# Patient Record
Sex: MALE | Race: ASIAN | NOT HISPANIC OR LATINO
[De-identification: names, ages, dates, MRNs, and addresses within clinical notes are randomized per-mention and may not be internally consistent; named-entity substitution may affect disease eponyms.]

---

## 2017-03-14 ENCOUNTER — APPOINTMENT (OUTPATIENT)
Dept: CARDIOLOGY | Facility: CLINIC | Age: 67
End: 2017-03-14

## 2017-05-01 ENCOUNTER — APPOINTMENT (OUTPATIENT)
Dept: CARDIOLOGY | Facility: CLINIC | Age: 67
End: 2017-05-01

## 2017-05-01 VITALS
SYSTOLIC BLOOD PRESSURE: 121 MMHG | DIASTOLIC BLOOD PRESSURE: 64 MMHG | WEIGHT: 225 LBS | OXYGEN SATURATION: 96 % | BODY MASS INDEX: 32.28 KG/M2 | HEART RATE: 70 BPM

## 2017-05-02 ENCOUNTER — NON-APPOINTMENT (OUTPATIENT)
Age: 67
End: 2017-05-02

## 2017-07-05 ENCOUNTER — OUTPATIENT (OUTPATIENT)
Dept: OUTPATIENT SERVICES | Facility: HOSPITAL | Age: 67
LOS: 1 days | End: 2017-07-05
Payer: MEDICARE

## 2017-07-05 ENCOUNTER — APPOINTMENT (OUTPATIENT)
Dept: RADIOLOGY | Facility: CLINIC | Age: 67
End: 2017-07-05

## 2017-07-05 DIAGNOSIS — Z00.8 ENCOUNTER FOR OTHER GENERAL EXAMINATION: ICD-10-CM

## 2017-07-05 PROCEDURE — 72100 X-RAY EXAM L-S SPINE 2/3 VWS: CPT

## 2017-10-05 ENCOUNTER — OUTPATIENT (OUTPATIENT)
Dept: OUTPATIENT SERVICES | Facility: HOSPITAL | Age: 67
LOS: 1 days | End: 2017-10-05
Payer: MEDICARE

## 2017-10-05 ENCOUNTER — APPOINTMENT (OUTPATIENT)
Dept: CT IMAGING | Facility: CLINIC | Age: 67
End: 2017-10-05
Payer: MEDICARE

## 2017-10-05 DIAGNOSIS — Z00.8 ENCOUNTER FOR OTHER GENERAL EXAMINATION: ICD-10-CM

## 2017-10-05 PROCEDURE — 70450 CT HEAD/BRAIN W/O DYE: CPT | Mod: 26

## 2017-10-05 PROCEDURE — 70450 CT HEAD/BRAIN W/O DYE: CPT

## 2017-11-01 ENCOUNTER — OUTPATIENT (OUTPATIENT)
Dept: OUTPATIENT SERVICES | Facility: HOSPITAL | Age: 67
LOS: 1 days | End: 2017-11-01
Payer: MEDICARE

## 2017-11-01 ENCOUNTER — APPOINTMENT (OUTPATIENT)
Dept: ULTRASOUND IMAGING | Facility: CLINIC | Age: 67
End: 2017-11-01
Payer: MEDICARE

## 2017-11-01 DIAGNOSIS — Z00.8 ENCOUNTER FOR OTHER GENERAL EXAMINATION: ICD-10-CM

## 2017-11-01 PROCEDURE — 76705 ECHO EXAM OF ABDOMEN: CPT

## 2017-11-01 PROCEDURE — 76705 ECHO EXAM OF ABDOMEN: CPT | Mod: 26

## 2018-01-09 ENCOUNTER — APPOINTMENT (OUTPATIENT)
Dept: CARDIOLOGY | Facility: CLINIC | Age: 68
End: 2018-01-09
Payer: MEDICARE

## 2018-01-09 VITALS
DIASTOLIC BLOOD PRESSURE: 73 MMHG | SYSTOLIC BLOOD PRESSURE: 126 MMHG | OXYGEN SATURATION: 95 % | WEIGHT: 220 LBS | HEART RATE: 68 BPM | BODY MASS INDEX: 31.5 KG/M2 | HEIGHT: 70 IN

## 2018-01-09 PROCEDURE — 99214 OFFICE O/P EST MOD 30 MIN: CPT | Mod: 25

## 2018-01-09 PROCEDURE — 93000 ELECTROCARDIOGRAM COMPLETE: CPT

## 2018-02-05 ENCOUNTER — NON-APPOINTMENT (OUTPATIENT)
Age: 68
End: 2018-02-05

## 2018-05-22 ENCOUNTER — APPOINTMENT (OUTPATIENT)
Dept: CARDIOLOGY | Facility: CLINIC | Age: 68
End: 2018-05-22
Payer: MEDICARE

## 2018-05-22 ENCOUNTER — NON-APPOINTMENT (OUTPATIENT)
Age: 68
End: 2018-05-22

## 2018-05-22 VITALS
WEIGHT: 209 LBS | HEART RATE: 75 BPM | DIASTOLIC BLOOD PRESSURE: 74 MMHG | HEIGHT: 70 IN | SYSTOLIC BLOOD PRESSURE: 114 MMHG | BODY MASS INDEX: 29.92 KG/M2 | TEMPERATURE: 98.4 F | OXYGEN SATURATION: 97 % | RESPIRATION RATE: 16 BRPM

## 2018-05-22 PROCEDURE — 99214 OFFICE O/P EST MOD 30 MIN: CPT | Mod: 25

## 2018-05-22 PROCEDURE — 93000 ELECTROCARDIOGRAM COMPLETE: CPT

## 2018-07-12 ENCOUNTER — RESULT REVIEW (OUTPATIENT)
Age: 68
End: 2018-07-12

## 2018-07-12 ENCOUNTER — OUTPATIENT (OUTPATIENT)
Dept: OUTPATIENT SERVICES | Facility: HOSPITAL | Age: 68
LOS: 1 days | Discharge: HOME | End: 2018-07-12
Payer: MEDICARE

## 2018-07-12 VITALS
HEART RATE: 62 BPM | RESPIRATION RATE: 24 BRPM | DIASTOLIC BLOOD PRESSURE: 69 MMHG | TEMPERATURE: 97 F | HEIGHT: 69 IN | SYSTOLIC BLOOD PRESSURE: 123 MMHG | WEIGHT: 212.08 LBS

## 2018-07-12 DIAGNOSIS — Z98.890 OTHER SPECIFIED POSTPROCEDURAL STATES: Chronic | ICD-10-CM

## 2018-07-12 PROCEDURE — 88305 TISSUE EXAM BY PATHOLOGIST: CPT | Mod: 26

## 2018-07-15 DIAGNOSIS — K64.8 OTHER HEMORRHOIDS: ICD-10-CM

## 2018-07-15 DIAGNOSIS — Z79.84 LONG TERM (CURRENT) USE OF ORAL HYPOGLYCEMIC DRUGS: ICD-10-CM

## 2018-07-15 DIAGNOSIS — I10 ESSENTIAL (PRIMARY) HYPERTENSION: ICD-10-CM

## 2018-07-15 DIAGNOSIS — Z12.11 ENCOUNTER FOR SCREENING FOR MALIGNANT NEOPLASM OF COLON: ICD-10-CM

## 2018-07-15 DIAGNOSIS — E11.9 TYPE 2 DIABETES MELLITUS WITHOUT COMPLICATIONS: ICD-10-CM

## 2018-07-15 DIAGNOSIS — I25.10 ATHEROSCLEROTIC HEART DISEASE OF NATIVE CORONARY ARTERY WITHOUT ANGINA PECTORIS: ICD-10-CM

## 2018-07-15 DIAGNOSIS — Z95.5 PRESENCE OF CORONARY ANGIOPLASTY IMPLANT AND GRAFT: ICD-10-CM

## 2018-07-15 DIAGNOSIS — Z86.010 PERSONAL HISTORY OF COLONIC POLYPS: ICD-10-CM

## 2018-07-15 DIAGNOSIS — Z82.49 FAMILY HISTORY OF ISCHEMIC HEART DISEASE AND OTHER DISEASES OF THE CIRCULATORY SYSTEM: ICD-10-CM

## 2018-07-15 DIAGNOSIS — Z86.19 PERSONAL HISTORY OF OTHER INFECTIOUS AND PARASITIC DISEASES: ICD-10-CM

## 2018-07-15 DIAGNOSIS — E78.5 HYPERLIPIDEMIA, UNSPECIFIED: ICD-10-CM

## 2018-07-16 LAB — SURGICAL PATHOLOGY FINAL REPORT - CH: SIGNIFICANT CHANGE UP

## 2018-08-29 ENCOUNTER — NON-APPOINTMENT (OUTPATIENT)
Age: 68
End: 2018-08-29

## 2018-08-29 ENCOUNTER — APPOINTMENT (OUTPATIENT)
Dept: CARDIOLOGY | Facility: CLINIC | Age: 68
End: 2018-08-29
Payer: MEDICARE

## 2018-08-29 VITALS
SYSTOLIC BLOOD PRESSURE: 122 MMHG | BODY MASS INDEX: 30.92 KG/M2 | DIASTOLIC BLOOD PRESSURE: 69 MMHG | HEART RATE: 78 BPM | WEIGHT: 216 LBS | OXYGEN SATURATION: 98 % | HEIGHT: 70 IN

## 2018-08-29 PROBLEM — E78.5 HYPERLIPIDEMIA, UNSPECIFIED: Chronic | Status: ACTIVE | Noted: 2018-07-12

## 2018-08-29 PROBLEM — B19.10 UNSPECIFIED VIRAL HEPATITIS B WITHOUT HEPATIC COMA: Chronic | Status: ACTIVE | Noted: 2018-07-12

## 2018-08-29 PROBLEM — I70.90 UNSPECIFIED ATHEROSCLEROSIS: Chronic | Status: ACTIVE | Noted: 2018-07-12

## 2018-08-29 PROCEDURE — 93000 ELECTROCARDIOGRAM COMPLETE: CPT

## 2018-08-29 PROCEDURE — 99214 OFFICE O/P EST MOD 30 MIN: CPT | Mod: 25

## 2018-09-10 ENCOUNTER — APPOINTMENT (OUTPATIENT)
Dept: CARDIOLOGY | Facility: CLINIC | Age: 68
End: 2018-09-10
Payer: MEDICARE

## 2018-09-10 PROCEDURE — 93306 TTE W/DOPPLER COMPLETE: CPT

## 2018-09-13 ENCOUNTER — APPOINTMENT (OUTPATIENT)
Dept: CARDIOLOGY | Facility: CLINIC | Age: 68
End: 2018-09-13
Payer: MEDICARE

## 2018-09-13 PROCEDURE — 93880 EXTRACRANIAL BILAT STUDY: CPT

## 2018-10-19 ENCOUNTER — NON-APPOINTMENT (OUTPATIENT)
Age: 68
End: 2018-10-19

## 2018-10-19 ENCOUNTER — APPOINTMENT (OUTPATIENT)
Dept: CARDIOLOGY | Facility: CLINIC | Age: 68
End: 2018-10-19
Payer: MEDICARE

## 2018-10-19 VITALS
WEIGHT: 216 LBS | TEMPERATURE: 99.3 F | BODY MASS INDEX: 30.92 KG/M2 | OXYGEN SATURATION: 98 % | RESPIRATION RATE: 16 BRPM | HEART RATE: 67 BPM | SYSTOLIC BLOOD PRESSURE: 124 MMHG | HEIGHT: 70 IN | DIASTOLIC BLOOD PRESSURE: 75 MMHG

## 2018-10-19 DIAGNOSIS — Z87.898 PERSONAL HISTORY OF OTHER SPECIFIED CONDITIONS: ICD-10-CM

## 2018-10-19 PROCEDURE — 90686 IIV4 VACC NO PRSV 0.5 ML IM: CPT

## 2018-10-19 PROCEDURE — 93000 ELECTROCARDIOGRAM COMPLETE: CPT

## 2018-10-19 PROCEDURE — G0008: CPT

## 2018-10-19 PROCEDURE — 99214 OFFICE O/P EST MOD 30 MIN: CPT | Mod: 25

## 2018-11-14 ENCOUNTER — APPOINTMENT (OUTPATIENT)
Dept: CARDIOLOGY | Facility: CLINIC | Age: 68
End: 2018-11-14

## 2018-11-20 ENCOUNTER — APPOINTMENT (OUTPATIENT)
Dept: CARDIOLOGY | Facility: CLINIC | Age: 68
End: 2018-11-20
Payer: MEDICARE

## 2018-11-20 ENCOUNTER — NON-APPOINTMENT (OUTPATIENT)
Age: 68
End: 2018-11-20

## 2018-11-20 VITALS
WEIGHT: 220 LBS | BODY MASS INDEX: 31.5 KG/M2 | HEART RATE: 80 BPM | SYSTOLIC BLOOD PRESSURE: 148 MMHG | OXYGEN SATURATION: 96 % | DIASTOLIC BLOOD PRESSURE: 78 MMHG | HEIGHT: 70 IN

## 2018-11-20 DIAGNOSIS — Z92.29 PERSONAL HISTORY OF OTHER DRUG THERAPY: ICD-10-CM

## 2018-11-20 PROCEDURE — 99215 OFFICE O/P EST HI 40 MIN: CPT | Mod: 25

## 2018-11-20 PROCEDURE — 93000 ELECTROCARDIOGRAM COMPLETE: CPT

## 2018-11-20 RX ORDER — METFORMIN HYDROCHLORIDE 500 MG/1
500 TABLET, COATED ORAL TWICE DAILY
Refills: 0 | Status: DISCONTINUED | COMMUNITY
End: 2018-11-20

## 2018-11-20 NOTE — REASON FOR VISIT
[Follow-Up - Clinic] : a clinic follow-up of [Coronary Artery Disease] : coronary artery disease [Medication Management] : Medication management

## 2018-11-20 NOTE — REVIEW OF SYSTEMS
[Fever] : no fever [Chills] : no chills [Feeling Fatigued] : not feeling fatigued [Dyspnea on exertion] : dyspnea during exertion [Chest  Pressure] : no chest pressure [Chest Pain] : no chest pain [Palpitations] : no palpitations [Cough] : no cough

## 2018-11-20 NOTE — HISTORY OF PRESENT ILLNESS
[FreeTextEntry1] : Having DUBON with stairs but no chest pain.  Otherwise denies chest pain, shortness of breath, palpitations, orthopnea, paroxysmal nocturnal dyspnea, claudication, dizziness,  lightheadedness, presyncopal, or syncopal symptoms.\par

## 2018-11-20 NOTE — DISCUSSION/SUMMARY
[___ Month(s)] : [unfilled] month(s) [With ___] : with [unfilled] [FreeTextEntry1] : DUBON with HX. of DM and CAD: Stress test for further evaluation. \par Hyperlipidemia: Tolerating simvastatin; patient says that lipids are routinely monitored by Co and recent blood work is good.\par Diabetes mellitus: Recent change to long acting metformin. \par F/u after test

## 2018-11-20 NOTE — PHYSICAL EXAM
[Well Groomed] : well groomed [General Appearance - In No Acute Distress] : no acute distress [Normal Conjunctiva] : the conjunctiva exhibited no abnormalities [Normal Oral Mucosa] : normal oral mucosa [No Oral Pallor] : no oral pallor [Normal Jugular Venous A Waves Present] : normal jugular venous A waves present [Normal Jugular Venous V Waves Present] : normal jugular venous V waves present [Respiration, Rhythm And Depth] : normal respiratory rhythm and effort [Auscultation Breath Sounds / Voice Sounds] : lungs were clear to auscultation bilaterally [5th Left ICS - MCL] : palpated at the 5th LICS in the midclavicular line [Normal] : normal [Rhythm Regular] : regular [Normal S1] : normal S1 [Normal S2] : normal S2 [I] : a grade 1 [2+] : left 2+ [No Pitting Edema] : no pitting edema present [Bowel Sounds] : normal bowel sounds [Abdomen Soft] : soft [Abnormal Walk] : normal gait [Nail Clubbing] : no clubbing of the fingernails [Cyanosis, Localized] : no localized cyanosis [] : no rash [Oriented To Time, Place, And Person] : oriented to person, place, and time [Affect] : the affect was normal [No Anxiety] : not feeling anxious

## 2018-11-29 ENCOUNTER — APPOINTMENT (OUTPATIENT)
Dept: CARDIOLOGY | Facility: CLINIC | Age: 68
End: 2018-11-29

## 2018-12-05 ENCOUNTER — APPOINTMENT (OUTPATIENT)
Dept: CARDIOLOGY | Facility: CLINIC | Age: 68
End: 2018-12-05

## 2018-12-13 ENCOUNTER — OTHER (OUTPATIENT)
Age: 68
End: 2018-12-13

## 2019-04-18 ENCOUNTER — APPOINTMENT (OUTPATIENT)
Dept: CARDIOLOGY | Facility: CLINIC | Age: 69
End: 2019-04-18

## 2019-04-30 ENCOUNTER — APPOINTMENT (OUTPATIENT)
Dept: CARDIOLOGY | Facility: CLINIC | Age: 69
End: 2019-04-30

## 2019-08-13 ENCOUNTER — APPOINTMENT (OUTPATIENT)
Dept: CARDIOLOGY | Facility: CLINIC | Age: 69
End: 2019-08-13
Payer: MEDICARE

## 2019-08-13 PROCEDURE — 78452 HT MUSCLE IMAGE SPECT MULT: CPT

## 2019-08-13 PROCEDURE — A9500: CPT

## 2019-08-13 PROCEDURE — 93015 CV STRESS TEST SUPVJ I&R: CPT

## 2019-08-19 ENCOUNTER — APPOINTMENT (OUTPATIENT)
Dept: ORTHOPEDIC SURGERY | Facility: CLINIC | Age: 69
End: 2019-08-19
Payer: MEDICARE

## 2019-08-19 VITALS
BODY MASS INDEX: 31.5 KG/M2 | SYSTOLIC BLOOD PRESSURE: 121 MMHG | HEIGHT: 70 IN | DIASTOLIC BLOOD PRESSURE: 67 MMHG | WEIGHT: 220 LBS | HEART RATE: 69 BPM

## 2019-08-19 PROCEDURE — 73562 X-RAY EXAM OF KNEE 3: CPT | Mod: LT

## 2019-08-19 PROCEDURE — 99204 OFFICE O/P NEW MOD 45 MIN: CPT

## 2019-08-19 NOTE — CONSULT LETTER
[Consult Letter:] : I had the pleasure of evaluating your patient, [unfilled]. [Please see my note below.] : Please see my note below. [Sincerely,] : Sincerely, [FreeTextEntry3] : Wilberto Stewart, DO\par Foot and Ankle Surgery \par

## 2019-08-19 NOTE — DISCUSSION/SUMMARY
[de-identified] : Today I had a lengthy discussion with the patient regarding their left knee pain. I have addressed all the patient's concerns surrounding the pathology of their condition. I recommend the patient undergo a course of physical therapy for the left knee 2-3 times a week for a total of 6-8 weeks. A prescription was given for the physical therapy today. I also recommended that the patient begin use of NSAIDs to help with the pain and inflammation. The patient understood and verbally agreed to the treatment plan. All of their questions were answered and they were satisfied with the visit. The patient should call the office if they have any questions or experience worsening symptoms.\par \par \par

## 2019-08-19 NOTE — ADDENDUM
[FreeTextEntry1] : I, Felisha Figueroa, acted solely as a scribe for Dr. Wilberto Stewart on this date 08/19/2019 .\par \par All medical record entries made by the Scribe were at my, Dr. Wilberto Stewart, direction and personally dictated by me on 08/19/2019 . I have reviewed the chart and agree that the record accurately reflects my personal performance of the history, physical exam, assessment and plan. I have also personally directed, reviewed, and agreed with the chart.\par \par \par

## 2019-08-19 NOTE — HISTORY OF PRESENT ILLNESS
[FreeTextEntry1] : 69 year old male presenting with left knee pain. He states that the pain is localized to the anterior left knee. The patient complains that the pain increases when walking and bending. He states that the pain is achy and throbbing. He is currently taking no pain medication. No other complaints at this time.\par \par \par

## 2019-08-19 NOTE — PHYSICAL EXAM
[de-identified] : General: Alert and oriented x3. In no acute distress. Pleasant in nature with a normal affect. No apparent respiratory distress.\par Left Knee Exam\par Skin: Clean, dry, intact\par Inspection: No obvious malalignment, no masses, no swelling, no effusion\par Pulses: 2+ DP/PT pulses\par ROM: Left Knee 0-120 degrees of flexion. No pain with deep knee flexion.\par Tenderness: No MJLT. LJLT tenderness. No pain over the patella facets. No pain to the quadriceps mechanism.\par Stability: Stable to varus, valgus, lachman testing. Negative anterior/posterior drawer.\par Strength: 5/5 Q/H/TA/GS/EHL, no atrophy\par Neuro: In tact to light touch throughout, DTR's normal\par Additional tests: Negative McMurrays test, Negative patellar grind test.  [de-identified] : 3V of the left knee were ordered obtained and reviewed by me today, 08/19/2019 , revealed: Patellofemoral arthritis. Clear space narrowing. \par \par

## 2019-09-04 ENCOUNTER — APPOINTMENT (OUTPATIENT)
Dept: CARDIOLOGY | Facility: CLINIC | Age: 69
End: 2019-09-04
Payer: MEDICARE

## 2019-09-04 ENCOUNTER — NON-APPOINTMENT (OUTPATIENT)
Age: 69
End: 2019-09-04

## 2019-09-04 VITALS
OXYGEN SATURATION: 98 % | HEART RATE: 59 BPM | DIASTOLIC BLOOD PRESSURE: 62 MMHG | HEIGHT: 70 IN | SYSTOLIC BLOOD PRESSURE: 108 MMHG | WEIGHT: 218 LBS | BODY MASS INDEX: 31.21 KG/M2

## 2019-09-04 DIAGNOSIS — M23.92 UNSPECIFIED INTERNAL DERANGEMENT OF LEFT KNEE: ICD-10-CM

## 2019-09-04 PROCEDURE — 93000 ELECTROCARDIOGRAM COMPLETE: CPT

## 2019-09-04 PROCEDURE — 99214 OFFICE O/P EST MOD 30 MIN: CPT | Mod: 25

## 2019-09-04 NOTE — DISCUSSION/SUMMARY
[___ Month(s)] : [unfilled] month(s) [With ___] : with [unfilled] [FreeTextEntry1] : DUBON with HX. of DM and CAD: Stress test negative for ischemia. reports sleep apnea testing 3-4 yrs ago and negative.  Picture c/w deconditioning from him not exercising likely due to his knee.  Discussed ways to work on this and types of exercise to help improve.  \par Hyperlipidemia: Tolerating simvastatin; patient says that lipids are routinely monitored by Co and recent blood work is good.\par Diabetes mellitus: Reports good results when checked recently. \par F/u 1 year

## 2019-09-04 NOTE — REASON FOR VISIT
[Follow-Up - Clinic] : a clinic follow-up of [Coronary Artery Disease] : coronary artery disease [Medication Management] : Medication management [Dyspnea] : dyspnea

## 2019-09-04 NOTE — REVIEW OF SYSTEMS
[Dyspnea on exertion] : dyspnea during exertion [Fever] : no fever [Chills] : no chills [Feeling Fatigued] : not feeling fatigued [Chest  Pressure] : no chest pressure [Chest Pain] : no chest pain [Palpitations] : no palpitations [Cough] : no cough

## 2019-09-04 NOTE — CARDIOLOGY SUMMARY
[LVEF ___%] : LVEF [unfilled]% [None] : normal LV function [Normal] : normal [___] : [unfilled] [No Ischemia] : no Ischemia

## 2019-09-04 NOTE — HISTORY OF PRESENT ILLNESS
[FreeTextEntry1] : Still having occasional DUBON with stairs but no chest pain. Stress test negative for ischemia.  Is very inactive because of his knee and is scheduled to start physical therapy (has seen ortho already).  Otherwise denies shortness of breath at rest, palpitations, orthopnea, paroxysmal nocturnal dyspnea, claudication, dizziness,  lightheadedness, presyncopal, or syncopal symptoms.\par

## 2019-09-04 NOTE — PHYSICAL EXAM
[Well Groomed] : well groomed [General Appearance - In No Acute Distress] : no acute distress [Normal Conjunctiva] : the conjunctiva exhibited no abnormalities [Normal Oral Mucosa] : normal oral mucosa [No Oral Pallor] : no oral pallor [Normal Jugular Venous A Waves Present] : normal jugular venous A waves present [Normal Jugular Venous V Waves Present] : normal jugular venous V waves present [Respiration, Rhythm And Depth] : normal respiratory rhythm and effort [Auscultation Breath Sounds / Voice Sounds] : lungs were clear to auscultation bilaterally [Bowel Sounds] : normal bowel sounds [Abdomen Soft] : soft [Abnormal Walk] : normal gait [Nail Clubbing] : no clubbing of the fingernails [Cyanosis, Localized] : no localized cyanosis [] : no rash [Affect] : the affect was normal [Oriented To Time, Place, And Person] : oriented to person, place, and time [No Anxiety] : not feeling anxious [5th Left ICS - MCL] : palpated at the 5th LICS in the midclavicular line [Normal] : normal [Rhythm Regular] : regular [Normal S1] : normal S1 [Normal S2] : normal S2 [I] : a grade 1 [2+] : left 2+ [No Pitting Edema] : no pitting edema present

## 2019-09-19 ENCOUNTER — RX RENEWAL (OUTPATIENT)
Age: 69
End: 2019-09-19

## 2019-10-01 ENCOUNTER — TRANSCRIPTION ENCOUNTER (OUTPATIENT)
Age: 69
End: 2019-10-01

## 2019-10-01 NOTE — ASU PATIENT PROFILE, ADULT - PMH
Arteriosclerosis    CAD (coronary artery disease)    Diabetes 1.5, managed as type 2    DJD (degenerative joint disease)    GERD (gastroesophageal reflux disease)    Heartburn    Hepatitis B    HTN (hypertension)    Hyperlipidemia    Osteoporosis

## 2019-10-02 ENCOUNTER — OUTPATIENT (OUTPATIENT)
Dept: OUTPATIENT SERVICES | Facility: HOSPITAL | Age: 69
LOS: 1 days | End: 2019-10-02
Payer: MEDICARE

## 2019-10-02 VITALS
TEMPERATURE: 98 F | RESPIRATION RATE: 19 BRPM | SYSTOLIC BLOOD PRESSURE: 140 MMHG | HEIGHT: 69 IN | HEART RATE: 60 BPM | WEIGHT: 213.85 LBS | DIASTOLIC BLOOD PRESSURE: 70 MMHG | OXYGEN SATURATION: 97 %

## 2019-10-02 VITALS
RESPIRATION RATE: 17 BRPM | HEART RATE: 64 BPM | DIASTOLIC BLOOD PRESSURE: 73 MMHG | SYSTOLIC BLOOD PRESSURE: 135 MMHG | OXYGEN SATURATION: 98 %

## 2019-10-02 DIAGNOSIS — H25.10 AGE-RELATED NUCLEAR CATARACT, UNSPECIFIED EYE: ICD-10-CM

## 2019-10-02 DIAGNOSIS — Z98.890 OTHER SPECIFIED POSTPROCEDURAL STATES: Chronic | ICD-10-CM

## 2019-10-02 LAB — GLUCOSE BLDC GLUCOMTR-MCNC: 231 MG/DL — HIGH (ref 70–99)

## 2019-10-02 PROCEDURE — 82962 GLUCOSE BLOOD TEST: CPT

## 2019-10-02 PROCEDURE — 66984 XCAPSL CTRC RMVL W/O ECP: CPT | Mod: LT

## 2019-10-02 PROCEDURE — V2788: CPT

## 2019-10-02 NOTE — ASU DISCHARGE PLAN (ADULT/PEDIATRIC) - CALL YOUR DOCTOR IF YOU HAVE ANY OF THE FOLLOWING:
Bleeding that does not stop/Nausea and vomiting that does not stop/Fever greater than (need to indicate Fahrenheit or Celsius)/Pain not relieved by Medications

## 2019-10-16 ENCOUNTER — RX RENEWAL (OUTPATIENT)
Age: 69
End: 2019-10-16

## 2019-10-25 PROBLEM — I10 ESSENTIAL (PRIMARY) HYPERTENSION: Chronic | Status: ACTIVE | Noted: 2019-10-01

## 2019-10-25 PROBLEM — M81.0 AGE-RELATED OSTEOPOROSIS WITHOUT CURRENT PATHOLOGICAL FRACTURE: Chronic | Status: ACTIVE | Noted: 2019-10-01

## 2019-10-25 PROBLEM — K21.9 GASTRO-ESOPHAGEAL REFLUX DISEASE WITHOUT ESOPHAGITIS: Chronic | Status: ACTIVE | Noted: 2019-10-01

## 2019-10-25 PROBLEM — M19.90 UNSPECIFIED OSTEOARTHRITIS, UNSPECIFIED SITE: Chronic | Status: ACTIVE | Noted: 2019-10-01

## 2019-10-25 PROBLEM — I25.10 ATHEROSCLEROTIC HEART DISEASE OF NATIVE CORONARY ARTERY WITHOUT ANGINA PECTORIS: Chronic | Status: ACTIVE | Noted: 2019-10-01

## 2019-10-29 ENCOUNTER — TRANSCRIPTION ENCOUNTER (OUTPATIENT)
Age: 69
End: 2019-10-29

## 2019-10-30 ENCOUNTER — OUTPATIENT (OUTPATIENT)
Dept: OUTPATIENT SERVICES | Facility: HOSPITAL | Age: 69
LOS: 1 days | End: 2019-10-30
Payer: MEDICARE

## 2019-10-30 VITALS
OXYGEN SATURATION: 96 % | DIASTOLIC BLOOD PRESSURE: 62 MMHG | HEART RATE: 67 BPM | SYSTOLIC BLOOD PRESSURE: 125 MMHG | RESPIRATION RATE: 18 BRPM | WEIGHT: 213.41 LBS | HEIGHT: 71 IN | TEMPERATURE: 98 F

## 2019-10-30 VITALS
OXYGEN SATURATION: 98 % | HEART RATE: 78 BPM | DIASTOLIC BLOOD PRESSURE: 61 MMHG | SYSTOLIC BLOOD PRESSURE: 133 MMHG | RESPIRATION RATE: 18 BRPM

## 2019-10-30 DIAGNOSIS — H25.11 AGE-RELATED NUCLEAR CATARACT, RIGHT EYE: ICD-10-CM

## 2019-10-30 DIAGNOSIS — Z98.49 CATARACT EXTRACTION STATUS, UNSPECIFIED EYE: Chronic | ICD-10-CM

## 2019-10-30 DIAGNOSIS — Z98.890 OTHER SPECIFIED POSTPROCEDURAL STATES: Chronic | ICD-10-CM

## 2019-10-30 LAB — GLUCOSE BLDC GLUCOMTR-MCNC: 266 MG/DL — HIGH (ref 70–99)

## 2019-10-30 PROCEDURE — 66984 XCAPSL CTRC RMVL W/O ECP: CPT | Mod: RT

## 2019-10-30 PROCEDURE — V2788: CPT

## 2019-10-30 PROCEDURE — 82962 GLUCOSE BLOOD TEST: CPT

## 2019-10-30 NOTE — ASU PATIENT PROFILE, ADULT - PSH
History of cardiac catheterization  with 4 stents 2003,2006,2012  Status post cataract extraction  left eye done on 10/2/2019

## 2019-10-30 NOTE — ASU DISCHARGE PLAN (ADULT/PEDIATRIC) - CARE PROVIDER_API CALL
Neville King)  Ophthalmology  [address] [facility], [address] [place] [address]  Phone: [phone]  Fax: [phone]  Follow Up Time:

## 2019-11-22 ENCOUNTER — APPOINTMENT (OUTPATIENT)
Dept: ULTRASOUND IMAGING | Facility: CLINIC | Age: 69
End: 2019-11-22
Payer: MEDICARE

## 2019-11-22 ENCOUNTER — OUTPATIENT (OUTPATIENT)
Dept: OUTPATIENT SERVICES | Facility: HOSPITAL | Age: 69
LOS: 1 days | End: 2019-11-22
Payer: MEDICARE

## 2019-11-22 DIAGNOSIS — Z98.890 OTHER SPECIFIED POSTPROCEDURAL STATES: Chronic | ICD-10-CM

## 2019-11-22 DIAGNOSIS — Z00.8 ENCOUNTER FOR OTHER GENERAL EXAMINATION: ICD-10-CM

## 2019-11-22 DIAGNOSIS — Z98.49 CATARACT EXTRACTION STATUS, UNSPECIFIED EYE: Chronic | ICD-10-CM

## 2019-11-22 PROCEDURE — 76700 US EXAM ABDOM COMPLETE: CPT | Mod: 26

## 2019-11-22 PROCEDURE — 76700 US EXAM ABDOM COMPLETE: CPT

## 2019-11-26 ENCOUNTER — INPATIENT (INPATIENT)
Facility: HOSPITAL | Age: 69
LOS: 2 days | Discharge: ANOTHER HOSPITAL | DRG: 281 | End: 2019-11-29
Attending: FAMILY MEDICINE | Admitting: HOSPITALIST
Payer: MEDICARE

## 2019-11-26 VITALS — WEIGHT: 214.95 LBS | HEIGHT: 68 IN

## 2019-11-26 DIAGNOSIS — Z98.49 CATARACT EXTRACTION STATUS, UNSPECIFIED EYE: Chronic | ICD-10-CM

## 2019-11-26 DIAGNOSIS — Z98.890 OTHER SPECIFIED POSTPROCEDURAL STATES: Chronic | ICD-10-CM

## 2019-11-26 LAB
ALBUMIN SERPL ELPH-MCNC: 3.3 G/DL — SIGNIFICANT CHANGE UP (ref 3.3–5)
ALP SERPL-CCNC: 166 U/L — HIGH (ref 40–120)
ALT FLD-CCNC: 34 U/L — SIGNIFICANT CHANGE UP (ref 12–78)
ANION GAP SERPL CALC-SCNC: 5 MMOL/L — SIGNIFICANT CHANGE UP (ref 5–17)
APTT BLD: 29.5 SEC — SIGNIFICANT CHANGE UP (ref 27.5–36.3)
AST SERPL-CCNC: 24 U/L — SIGNIFICANT CHANGE UP (ref 15–37)
BILIRUB SERPL-MCNC: 0.3 MG/DL — SIGNIFICANT CHANGE UP (ref 0.2–1.2)
BUN SERPL-MCNC: 24 MG/DL — HIGH (ref 7–23)
CALCIUM SERPL-MCNC: 8.8 MG/DL — SIGNIFICANT CHANGE UP (ref 8.5–10.1)
CHLORIDE SERPL-SCNC: 103 MMOL/L — SIGNIFICANT CHANGE UP (ref 96–108)
CO2 SERPL-SCNC: 26 MMOL/L — SIGNIFICANT CHANGE UP (ref 22–31)
CREAT SERPL-MCNC: 1.33 MG/DL — HIGH (ref 0.5–1.3)
GLUCOSE SERPL-MCNC: 354 MG/DL — HIGH (ref 70–99)
HCT VFR BLD CALC: 41.8 % — SIGNIFICANT CHANGE UP (ref 39–50)
HGB BLD-MCNC: 12.9 G/DL — LOW (ref 13–17)
INR BLD: 0.98 RATIO — SIGNIFICANT CHANGE UP (ref 0.88–1.16)
MAGNESIUM SERPL-MCNC: 2 MG/DL — SIGNIFICANT CHANGE UP (ref 1.6–2.6)
MCHC RBC-ENTMCNC: 25.9 PG — LOW (ref 27–34)
MCHC RBC-ENTMCNC: 30.9 GM/DL — LOW (ref 32–36)
MCV RBC AUTO: 83.9 FL — SIGNIFICANT CHANGE UP (ref 80–100)
NT-PROBNP SERPL-SCNC: 51 PG/ML — SIGNIFICANT CHANGE UP (ref 0–125)
PLATELET # BLD AUTO: 143 K/UL — LOW (ref 150–400)
POTASSIUM SERPL-MCNC: 4.8 MMOL/L — SIGNIFICANT CHANGE UP (ref 3.5–5.3)
POTASSIUM SERPL-SCNC: 4.8 MMOL/L — SIGNIFICANT CHANGE UP (ref 3.5–5.3)
PROT SERPL-MCNC: 7.6 GM/DL — SIGNIFICANT CHANGE UP (ref 6–8.3)
PROTHROM AB SERPL-ACNC: 10.9 SEC — SIGNIFICANT CHANGE UP (ref 10–12.9)
RBC # BLD: 4.98 M/UL — SIGNIFICANT CHANGE UP (ref 4.2–5.8)
RBC # FLD: 13.6 % — SIGNIFICANT CHANGE UP (ref 10.3–14.5)
SODIUM SERPL-SCNC: 134 MMOL/L — LOW (ref 135–145)
TROPONIN I SERPL-MCNC: 0.15 NG/ML — HIGH (ref 0.01–0.04)
WBC # BLD: 8.21 K/UL — SIGNIFICANT CHANGE UP (ref 3.8–10.5)
WBC # FLD AUTO: 8.21 K/UL — SIGNIFICANT CHANGE UP (ref 3.8–10.5)

## 2019-11-26 PROCEDURE — 71045 X-RAY EXAM CHEST 1 VIEW: CPT | Mod: 26

## 2019-11-26 PROCEDURE — 93010 ELECTROCARDIOGRAM REPORT: CPT

## 2019-11-26 RX ORDER — ASPIRIN/CALCIUM CARB/MAGNESIUM 324 MG
324 TABLET ORAL ONCE
Refills: 0 | Status: COMPLETED | OUTPATIENT
Start: 2019-11-26 | End: 2019-11-26

## 2019-11-26 RX ADMIN — Medication 324 MILLIGRAM(S): at 23:21

## 2019-11-26 NOTE — ED ADULT TRIAGE NOTE - CHIEF COMPLAINT QUOTE
c/o intermittent chest pain first starting last night. Chest pain returned at 1700 & again at 2200. Pt took Nitro x2 tabs at 2220, No distress noted. Denies N/V

## 2019-11-27 DIAGNOSIS — I25.10 ATHEROSCLEROTIC HEART DISEASE OF NATIVE CORONARY ARTERY WITHOUT ANGINA PECTORIS: ICD-10-CM

## 2019-11-27 DIAGNOSIS — E13.9 OTHER SPECIFIED DIABETES MELLITUS WITHOUT COMPLICATIONS: ICD-10-CM

## 2019-11-27 DIAGNOSIS — I24.9 ACUTE ISCHEMIC HEART DISEASE, UNSPECIFIED: ICD-10-CM

## 2019-11-27 DIAGNOSIS — E78.5 HYPERLIPIDEMIA, UNSPECIFIED: ICD-10-CM

## 2019-11-27 LAB
ANION GAP SERPL CALC-SCNC: 6 MMOL/L — SIGNIFICANT CHANGE UP (ref 5–17)
APPEARANCE UR: CLEAR — SIGNIFICANT CHANGE UP
BILIRUB UR-MCNC: NEGATIVE — SIGNIFICANT CHANGE UP
BUN SERPL-MCNC: 20 MG/DL — SIGNIFICANT CHANGE UP (ref 7–23)
CALCIUM SERPL-MCNC: 8.7 MG/DL — SIGNIFICANT CHANGE UP (ref 8.5–10.1)
CHLORIDE SERPL-SCNC: 106 MMOL/L — SIGNIFICANT CHANGE UP (ref 96–108)
CK SERPL-CCNC: 63 U/L — SIGNIFICANT CHANGE UP (ref 26–308)
CK SERPL-CCNC: 68 U/L — SIGNIFICANT CHANGE UP (ref 26–308)
CO2 SERPL-SCNC: 24 MMOL/L — SIGNIFICANT CHANGE UP (ref 22–31)
COLOR SPEC: YELLOW — SIGNIFICANT CHANGE UP
CREAT SERPL-MCNC: 1.17 MG/DL — SIGNIFICANT CHANGE UP (ref 0.5–1.3)
DIFF PNL FLD: NEGATIVE — SIGNIFICANT CHANGE UP
GLUCOSE SERPL-MCNC: 180 MG/DL — HIGH (ref 70–99)
GLUCOSE UR QL: 1000 MG/DL
HCT VFR BLD CALC: 39.2 % — SIGNIFICANT CHANGE UP (ref 39–50)
HGB BLD-MCNC: 12.6 G/DL — LOW (ref 13–17)
KETONES UR-MCNC: NEGATIVE — SIGNIFICANT CHANGE UP
LEUKOCYTE ESTERASE UR-ACNC: NEGATIVE — SIGNIFICANT CHANGE UP
MCHC RBC-ENTMCNC: 26.5 PG — LOW (ref 27–34)
MCHC RBC-ENTMCNC: 32.1 GM/DL — SIGNIFICANT CHANGE UP (ref 32–36)
MCV RBC AUTO: 82.4 FL — SIGNIFICANT CHANGE UP (ref 80–100)
NITRITE UR-MCNC: NEGATIVE — SIGNIFICANT CHANGE UP
PH UR: 5 — SIGNIFICANT CHANGE UP (ref 5–8)
PLATELET # BLD AUTO: 126 K/UL — LOW (ref 150–400)
POTASSIUM SERPL-MCNC: 4.5 MMOL/L — SIGNIFICANT CHANGE UP (ref 3.5–5.3)
POTASSIUM SERPL-SCNC: 4.5 MMOL/L — SIGNIFICANT CHANGE UP (ref 3.5–5.3)
PROT UR-MCNC: NEGATIVE MG/DL — SIGNIFICANT CHANGE UP
RBC # BLD: 4.76 M/UL — SIGNIFICANT CHANGE UP (ref 4.2–5.8)
RBC # FLD: 13.8 % — SIGNIFICANT CHANGE UP (ref 10.3–14.5)
SODIUM SERPL-SCNC: 136 MMOL/L — SIGNIFICANT CHANGE UP (ref 135–145)
SP GR SPEC: 1.01 — SIGNIFICANT CHANGE UP (ref 1.01–1.02)
TROPONIN I SERPL-MCNC: 0.3 NG/ML — HIGH (ref 0.01–0.04)
TROPONIN I SERPL-MCNC: 0.38 NG/ML — HIGH (ref 0.01–0.04)
TROPONIN I SERPL-MCNC: 0.45 NG/ML — HIGH (ref 0.01–0.04)
UROBILINOGEN FLD QL: NEGATIVE MG/DL — SIGNIFICANT CHANGE UP
WBC # BLD: 7.42 K/UL — SIGNIFICANT CHANGE UP (ref 3.8–10.5)
WBC # FLD AUTO: 7.42 K/UL — SIGNIFICANT CHANGE UP (ref 3.8–10.5)

## 2019-11-27 PROCEDURE — 86803 HEPATITIS C AB TEST: CPT

## 2019-11-27 PROCEDURE — 93306 TTE W/DOPPLER COMPLETE: CPT | Mod: 26

## 2019-11-27 PROCEDURE — C1769: CPT

## 2019-11-27 PROCEDURE — 36415 COLL VENOUS BLD VENIPUNCTURE: CPT

## 2019-11-27 PROCEDURE — 81003 URINALYSIS AUTO W/O SCOPE: CPT

## 2019-11-27 PROCEDURE — C1894: CPT

## 2019-11-27 PROCEDURE — C1760: CPT

## 2019-11-27 PROCEDURE — 85025 COMPLETE CBC W/AUTO DIFF WBC: CPT

## 2019-11-27 PROCEDURE — C1887: CPT

## 2019-11-27 PROCEDURE — 82550 ASSAY OF CK (CPK): CPT

## 2019-11-27 PROCEDURE — 93306 TTE W/DOPPLER COMPLETE: CPT

## 2019-11-27 PROCEDURE — 99223 1ST HOSP IP/OBS HIGH 75: CPT

## 2019-11-27 PROCEDURE — 93458 L HRT ARTERY/VENTRICLE ANGIO: CPT

## 2019-11-27 PROCEDURE — G0269: CPT

## 2019-11-27 PROCEDURE — 83735 ASSAY OF MAGNESIUM: CPT

## 2019-11-27 PROCEDURE — 93010 ELECTROCARDIOGRAM REPORT: CPT

## 2019-11-27 PROCEDURE — 84100 ASSAY OF PHOSPHORUS: CPT

## 2019-11-27 PROCEDURE — 93005 ELECTROCARDIOGRAM TRACING: CPT

## 2019-11-27 PROCEDURE — 80048 BASIC METABOLIC PNL TOTAL CA: CPT

## 2019-11-27 PROCEDURE — 83036 HEMOGLOBIN GLYCOSYLATED A1C: CPT

## 2019-11-27 PROCEDURE — 85027 COMPLETE CBC AUTOMATED: CPT

## 2019-11-27 PROCEDURE — 84484 ASSAY OF TROPONIN QUANT: CPT

## 2019-11-27 PROCEDURE — 82962 GLUCOSE BLOOD TEST: CPT

## 2019-11-27 RX ORDER — NITROGLYCERIN 6.5 MG
0.4 CAPSULE, EXTENDED RELEASE ORAL
Refills: 0 | Status: DISCONTINUED | OUTPATIENT
Start: 2019-11-27 | End: 2019-11-29

## 2019-11-27 RX ORDER — ASPIRIN/CALCIUM CARB/MAGNESIUM 324 MG
162 TABLET ORAL DAILY
Refills: 0 | Status: DISCONTINUED | OUTPATIENT
Start: 2019-11-27 | End: 2019-11-29

## 2019-11-27 RX ORDER — SODIUM CHLORIDE 9 MG/ML
1000 INJECTION, SOLUTION INTRAVENOUS
Refills: 0 | Status: DISCONTINUED | OUTPATIENT
Start: 2019-11-27 | End: 2019-11-27

## 2019-11-27 RX ORDER — ASPIRIN/CALCIUM CARB/MAGNESIUM 324 MG
81 TABLET ORAL DAILY
Refills: 0 | Status: DISCONTINUED | OUTPATIENT
Start: 2019-11-27 | End: 2019-11-27

## 2019-11-27 RX ORDER — VITAMIN E 100 UNIT
400 CAPSULE ORAL DAILY
Refills: 0 | Status: DISCONTINUED | OUTPATIENT
Start: 2019-11-27 | End: 2019-11-29

## 2019-11-27 RX ORDER — DEXTROSE 50 % IN WATER 50 %
25 SYRINGE (ML) INTRAVENOUS ONCE
Refills: 0 | Status: DISCONTINUED | OUTPATIENT
Start: 2019-11-27 | End: 2019-11-29

## 2019-11-27 RX ORDER — SODIUM CHLORIDE 9 MG/ML
1000 INJECTION, SOLUTION INTRAVENOUS
Refills: 0 | Status: DISCONTINUED | OUTPATIENT
Start: 2019-11-27 | End: 2019-11-29

## 2019-11-27 RX ORDER — METFORMIN HYDROCHLORIDE 850 MG/1
1 TABLET ORAL
Qty: 0 | Refills: 0 | DISCHARGE

## 2019-11-27 RX ORDER — SIMVASTATIN 20 MG/1
40 TABLET, FILM COATED ORAL AT BEDTIME
Refills: 0 | Status: DISCONTINUED | OUTPATIENT
Start: 2019-11-27 | End: 2019-11-29

## 2019-11-27 RX ORDER — LECITHIN 1200 MG
0 CAPSULE ORAL
Qty: 0 | Refills: 0 | DISCHARGE

## 2019-11-27 RX ORDER — DEXTROSE 50 % IN WATER 50 %
15 SYRINGE (ML) INTRAVENOUS ONCE
Refills: 0 | Status: DISCONTINUED | OUTPATIENT
Start: 2019-11-27 | End: 2019-11-29

## 2019-11-27 RX ORDER — PANTOPRAZOLE SODIUM 20 MG/1
40 TABLET, DELAYED RELEASE ORAL
Refills: 0 | Status: DISCONTINUED | OUTPATIENT
Start: 2019-11-27 | End: 2019-11-29

## 2019-11-27 RX ORDER — INSULIN LISPRO 100/ML
VIAL (ML) SUBCUTANEOUS EVERY 6 HOURS
Refills: 0 | Status: DISCONTINUED | OUTPATIENT
Start: 2019-11-27 | End: 2019-11-29

## 2019-11-27 RX ORDER — DEXTROSE 50 % IN WATER 50 %
12.5 SYRINGE (ML) INTRAVENOUS ONCE
Refills: 0 | Status: DISCONTINUED | OUTPATIENT
Start: 2019-11-27 | End: 2019-11-29

## 2019-11-27 RX ORDER — METOPROLOL TARTRATE 50 MG
50 TABLET ORAL
Refills: 0 | Status: DISCONTINUED | OUTPATIENT
Start: 2019-11-27 | End: 2019-11-29

## 2019-11-27 RX ORDER — ASPIRIN/CALCIUM CARB/MAGNESIUM 324 MG
1 TABLET ORAL
Qty: 0 | Refills: 0 | DISCHARGE

## 2019-11-27 RX ORDER — GLUCAGON INJECTION, SOLUTION 0.5 MG/.1ML
1 INJECTION, SOLUTION SUBCUTANEOUS ONCE
Refills: 0 | Status: DISCONTINUED | OUTPATIENT
Start: 2019-11-27 | End: 2019-11-29

## 2019-11-27 RX ORDER — CANAGLIFLOZIN 100 MG/1
1 TABLET, FILM COATED ORAL
Qty: 0 | Refills: 0 | DISCHARGE

## 2019-11-27 RX ORDER — ACETAMINOPHEN 500 MG
650 TABLET ORAL EVERY 6 HOURS
Refills: 0 | Status: DISCONTINUED | OUTPATIENT
Start: 2019-11-27 | End: 2019-11-29

## 2019-11-27 RX ADMIN — SIMVASTATIN 40 MILLIGRAM(S): 20 TABLET, FILM COATED ORAL at 22:40

## 2019-11-27 RX ADMIN — Medication 0.4 MILLIGRAM(S): at 23:54

## 2019-11-27 RX ADMIN — Medication 6: at 17:26

## 2019-11-27 RX ADMIN — SODIUM CHLORIDE 75 MILLILITER(S): 9 INJECTION, SOLUTION INTRAVENOUS at 11:50

## 2019-11-27 RX ADMIN — Medication 2: at 12:29

## 2019-11-27 RX ADMIN — Medication 50 MILLIGRAM(S): at 17:27

## 2019-11-27 RX ADMIN — Medication 162 MILLIGRAM(S): at 12:29

## 2019-11-27 RX ADMIN — PANTOPRAZOLE SODIUM 40 MILLIGRAM(S): 20 TABLET, DELAYED RELEASE ORAL at 12:29

## 2019-11-27 RX ADMIN — Medication 50 MILLIGRAM(S): at 10:37

## 2019-11-27 NOTE — ED ADULT NURSE NOTE - CHPI ED NUR SYMPTOMS NEG
no syncope/no nausea/no shortness of breath/no vomiting/no congestion/no diaphoresis/no dizziness/no back pain/no fever/no chills

## 2019-11-27 NOTE — H&P ADULT - ASSESSMENT
#Chest pain  #ACS r/o MI  #Elevated troponins  #HTN, HLD, GERD  #DM  #CAD s/p stents    Plan:  -Admit to tele  -Serial enzymes and EKG  -Cardio consult  -Check TTE  -Consider NST vs. cardiac cath as per Cardio recs- NPO for now  -Continue home medications  -Hold oral hypoglycemics, monitor FS Q6 when NPO and AC and HS when on diet, mod ISS  -DVT ppx- SCDs and ambulation

## 2019-11-27 NOTE — CONSULT NOTE ADULT - SUBJECTIVE AND OBJECTIVE BOX
HPI:  68 y/o M PMHx CAD s/p stents x4 (last cath 2013), GERD, HLD, DM presented to ED with complaint of chest pain. Patient states chest pain has been intermittent for past 3 days. States pain usually lasts for about 15 minutes and subsides on its own. Patient denies any aggravating factors. States last night pain became severe and was improved after taking nitroglycerin so he came to ED for further evaluation. Patient states pain is located in the middle of his chest and sometimes will radiate to R arm.  Patient denies any SOB, but does admit to SOB on exertion for the past few weeks. Denies any abd pain, N/V/D/C.    In ED, patient received ASA 325mg x1, CXR done negative for acute cardiopulmonary process. Patient labs significant for elevation in troponin- 0.155-->0.305-->0.384. EKG showing NSR @ 79bpm (27 Nov 2019 09:25)    11/27/'19: consulted for chest pain & borderline trop elevation.  Pt reports episodes of pressure type chest pain over past 2-3 days.  Worse w/ exertion, relieved w/ rest & NTG.  No atypical features.  Also w/ chest pain today in hospital, brief in duration.      PAST MEDICAL & SURGICAL HISTORY:  Osteoporosis  DJD (degenerative joint disease)  CAD (coronary artery disease)  HTN (hypertension)  GERD (gastroesophageal reflux disease)  Hyperlipidemia  Hepatitis B  Heartburn  Diabetes 1.5, managed as type 2  Arteriosclerosis  Status post cataract extraction: left eye done on 10/2/2019  History of cardiac catheterization: with 4 stents 2003,2006,2012      Allergies    No Known Allergies    Intolerances        SOCIAL HISTORY:    FAMILY HISTORY:  FH: myocardial infarction      MEDICATIONS:  MEDICATIONS  (STANDING):  aspirin enteric coated 162 milliGRAM(s) Oral daily  dextrose 5%. 1000 milliLiter(s) (50 mL/Hr) IV Continuous <Continuous>  dextrose 50% Injectable 12.5 Gram(s) IV Push once  dextrose 50% Injectable 25 Gram(s) IV Push once  dextrose 50% Injectable 25 Gram(s) IV Push once  insulin lispro (HumaLOG) corrective regimen sliding scale   SubCutaneous every 6 hours  metoprolol tartrate 50 milliGRAM(s) Oral two times a day  pantoprazole    Tablet 40 milliGRAM(s) Oral before breakfast  simvastatin 40 milliGRAM(s) Oral at bedtime  vitamin E 400 International Unit(s) Oral daily    MEDICATIONS  (PRN):  acetaminophen   Tablet .. 650 milliGRAM(s) Oral every 6 hours PRN Mild Pain (1 - 3)  dextrose 40% Gel 15 Gram(s) Oral once PRN Blood Glucose LESS THAN 70 milliGRAM(s)/deciliter  glucagon  Injectable 1 milliGRAM(s) IntraMuscular once PRN Glucose LESS THAN 70 milligrams/deciliter  nitroglycerin     SubLingual 0.4 milliGRAM(s) SubLingual every 5 minutes PRN Chest Pain      REVIEW OF SYSTEMS:    CONSTITUTIONAL: No weakness, fevers or chills  EYES/ENT: No visual changes;  No vertigo or throat pain   NECK: No pain or stiffness  RESPIRATORY: No cough, wheezing, hemoptysis; No shortness of breath  GASTROINTESTINAL: No abdominal or epigastric pain. No nausea, vomiting, or hematemesis; No diarrhea or constipation. No melena or hematochezia.  GENITOURINARY: No dysuria, frequency or hematuria  NEUROLOGICAL: No numbness or weakness  SKIN: No itching, burning, rashes, or lesions   All other review of systems is negative unless indicated above    Vital Signs Last 24 Hrs  T(C): 36.7 (27 Nov 2019 12:38), Max: 36.7 (26 Nov 2019 22:52)  T(F): 98.1 (27 Nov 2019 12:38), Max: 98.1 (27 Nov 2019 12:38)  HR: 59 (27 Nov 2019 12:38) (59 - 80)  BP: 146/61 (27 Nov 2019 12:38) (116/64 - 146/61)  BP(mean): 76 (27 Nov 2019 02:07) (76 - 76)  RR: 18 (27 Nov 2019 12:38) (15 - 18)  SpO2: 96% (27 Nov 2019 12:38) (96% - 99%)    I&O's Summary      PHYSICAL EXAM:    Constitutional: NAD, awake and alert, well-developed  HEENT: PERR, EOMI,  No oral cyananosis.  Neck:  supple,  No JVD  Respiratory: Breath sounds are clear bilaterally, No wheezing, rales or rhonchi  Cardiovascular: S1 and S2, regular rate and rhythm, no Murmurs, gallops or rubs  Gastrointestinal: Bowel Sounds present, soft, nontender.   Extremities: No peripheral edema. No clubbing or cyanosis.  Vascular: 2+ peripheral pulses  Neurological: A/O x 3, no focal deficits  Musculoskeletal: no calf tenderness.  Skin: No rashes.      LABS: All Labs Reviewed:                        12.6   7.42  )-----------( 126      ( 27 Nov 2019 08:25 )             39.2                         12.9   8.21  )-----------( 143      ( 26 Nov 2019 23:06 )             41.8     27 Nov 2019 08:25    136    |  106    |  20     ----------------------------<  180    4.5     |  24     |  1.17   26 Nov 2019 23:06    134    |  103    |  24     ----------------------------<  354    4.8     |  26     |  1.33     Ca    8.7        27 Nov 2019 08:25  Ca    8.8        26 Nov 2019 23:06  Mg     2.0       26 Nov 2019 23:06    TPro  7.6    /  Alb  3.3    /  TBili  0.3    /  DBili  x      /  AST  24     /  ALT  34     /  AlkPhos  166    26 Nov 2019 23:06    PT/INR - ( 26 Nov 2019 23:06 )   PT: 10.9 sec;   INR: 0.98 ratio         PTT - ( 26 Nov 2019 23:06 )  PTT:29.5 sec  CARDIAC MARKERS ( 27 Nov 2019 08:25 )  0.384 ng/mL / x     / 63 U/L / x     / x      CARDIAC MARKERS ( 27 Nov 2019 02:24 )  0.305 ng/mL / x     / x     / x     / x      CARDIAC MARKERS ( 26 Nov 2019 23:06 )  0.155 ng/mL / x     / x     / x     / x          Blood Culture:   11-26 @ 23:06  Pro Bnp 51    RADIOLOGY/EKG:  ECG- NSR no ischemic changes.    < from: Transthoracic Echocardiogram (11.27.19 @ 11:03) >   Summary     Fibrocalcific changes noted to the mitral valve leaflets with preserved   leaflet excursion.   Trace mitral regurgitation is present.   Fibrocalcific changes noted to the Aortic valve leaflets with preserved   leaflet excursion.   Mild (1+) aortic regurgitation is present.   The left ventricle is normal in size, wall thickness, wall motion and   contractility.   Estimated left ventricular ejection fraction is 65-70 %.     Signature     ----------------------------------------------------------------   Electronically signed by MARY JeanInterpreting   physician) on 11/27/2019 03:26 PM   ----------------------------------------------------------------    < end of copied text >

## 2019-11-27 NOTE — H&P ADULT - RS GEN PE MLT RESP DETAILS PC
no intercostal retractions/no wheezes/no chest wall tenderness/no rhonchi/breath sounds equal/clear to auscultation bilaterally/no rales/respirations non-labored

## 2019-11-27 NOTE — ED ADULT NURSE NOTE - OBJECTIVE STATEMENT
Pt c/o chest pain starting 2 days ago. Pt hx 4 stents, DM. Pt took 2 baby aspirin this AM and 1 nitro at 10:22pm and another nitro at 10:50pm. Pt states nitro relieved pain medication. Pt is alert and oriented x4. Pt denies shortness of breath. Pt states chest pain is no longer present in ED. EKG done, cardiac monitoring in progress, safety maintained.

## 2019-11-27 NOTE — ED PROVIDER NOTE - OBJECTIVE STATEMENT
68 yo male with ho cardiac stent 2013 by Dr. Brady guillory intermittent chest pain since yesterday. Tonight pt had pain at 2200 took 2 nitroglycerin with resolution of symptoms. currently is chest pain free. PCP Dr. Causey, Cardiology: Audie

## 2019-11-27 NOTE — H&P ADULT - NSICDXPASTMEDICALHX_GEN_ALL_CORE_FT
PAST MEDICAL HISTORY:  Arteriosclerosis     CAD (coronary artery disease)     Diabetes 1.5, managed as type 2     DJD (degenerative joint disease)     GERD (gastroesophageal reflux disease)     Heartburn     Hepatitis B     HTN (hypertension)     Hyperlipidemia     Osteoporosis

## 2019-11-27 NOTE — H&P ADULT - NSICDXPASTSURGICALHX_GEN_ALL_CORE_FT
PAST SURGICAL HISTORY:  History of cardiac catheterization with 4 stents 2003,2006,2012    Status post cataract extraction left eye done on 10/2/2019

## 2019-11-27 NOTE — CONSULT NOTE ADULT - PROBLEM SELECTOR RECOMMENDATION 9
atypical chest pain w/ features concerning for angina.  Trop levels borderline.  Echo w/ no regional wall motion abnormalities.  cont. serial cardiac enzymes.  If significant elevation in trop (>1-3) will start hep & keep in hospital until mon for cath.  If trends down, will discharge w/ plan for cath next monday as OP.

## 2019-11-27 NOTE — H&P ADULT - HISTORY OF PRESENT ILLNESS
68 y/o M PMHx CAD s/p stents x4 (last cath 2013), GERD, HLD, DM presented to ED with complaint of chest pain. Patient states chest pain has been intermittent for past 3 days. States pain usually lasts for about 15 minutes and subsides on its own. Patient denies any aggravating factors. States last night pain became severe and was improved after taking nitroglycerin so he came to ED for further evaluation. Patient states pain is located in the middle of his chest and sometimes will radiate to R arm.  Patient denies any SOB, but does admit to SOB on exertion for the past few weeks. Denies any abd pain, N/V/D/C.    In ED, patient received ASA 325mg x1, CXR done negative for acute cardiopulmonary process. Patient labs significant for elevation in troponin- 0.155-->0.305-->0.384. EKG showing NSR @ 79bpm

## 2019-11-27 NOTE — H&P ADULT - NEUROLOGICAL DETAILS
no spontaneous movement/responds to verbal commands/sensation intact/cranial nerves intact/normal strength/alert and oriented x 3

## 2019-11-28 DIAGNOSIS — I24.9 ACUTE ISCHEMIC HEART DISEASE, UNSPECIFIED: ICD-10-CM

## 2019-11-28 LAB
ANION GAP SERPL CALC-SCNC: 8 MMOL/L — SIGNIFICANT CHANGE UP (ref 5–17)
BASOPHILS # BLD AUTO: 0.03 K/UL — SIGNIFICANT CHANGE UP (ref 0–0.2)
BASOPHILS NFR BLD AUTO: 0.5 % — SIGNIFICANT CHANGE UP (ref 0–2)
BUN SERPL-MCNC: 18 MG/DL — SIGNIFICANT CHANGE UP (ref 7–23)
CALCIUM SERPL-MCNC: 8.7 MG/DL — SIGNIFICANT CHANGE UP (ref 8.5–10.1)
CHLORIDE SERPL-SCNC: 107 MMOL/L — SIGNIFICANT CHANGE UP (ref 96–108)
CK SERPL-CCNC: 62 U/L — SIGNIFICANT CHANGE UP (ref 26–308)
CK SERPL-CCNC: 62 U/L — SIGNIFICANT CHANGE UP (ref 26–308)
CO2 SERPL-SCNC: 25 MMOL/L — SIGNIFICANT CHANGE UP (ref 22–31)
CREAT SERPL-MCNC: 1.17 MG/DL — SIGNIFICANT CHANGE UP (ref 0.5–1.3)
EOSINOPHIL # BLD AUTO: 0.19 K/UL — SIGNIFICANT CHANGE UP (ref 0–0.5)
EOSINOPHIL NFR BLD AUTO: 3 % — SIGNIFICANT CHANGE UP (ref 0–6)
GLUCOSE SERPL-MCNC: 208 MG/DL — HIGH (ref 70–99)
HBA1C BLD-MCNC: 10.3 % — HIGH (ref 4–5.6)
HCT VFR BLD CALC: 41.2 % — SIGNIFICANT CHANGE UP (ref 39–50)
HCV AB S/CO SERPL IA: 0.1 S/CO — SIGNIFICANT CHANGE UP (ref 0–0.99)
HCV AB SERPL-IMP: SIGNIFICANT CHANGE UP
HGB BLD-MCNC: 12.9 G/DL — LOW (ref 13–17)
IMM GRANULOCYTES NFR BLD AUTO: 0.5 % — SIGNIFICANT CHANGE UP (ref 0–1.5)
LYMPHOCYTES # BLD AUTO: 2.26 K/UL — SIGNIFICANT CHANGE UP (ref 1–3.3)
LYMPHOCYTES # BLD AUTO: 35.1 % — SIGNIFICANT CHANGE UP (ref 13–44)
MAGNESIUM SERPL-MCNC: 2.2 MG/DL — SIGNIFICANT CHANGE UP (ref 1.6–2.6)
MCHC RBC-ENTMCNC: 26.1 PG — LOW (ref 27–34)
MCHC RBC-ENTMCNC: 31.3 GM/DL — LOW (ref 32–36)
MCV RBC AUTO: 83.2 FL — SIGNIFICANT CHANGE UP (ref 80–100)
MONOCYTES # BLD AUTO: 0.61 K/UL — SIGNIFICANT CHANGE UP (ref 0–0.9)
MONOCYTES NFR BLD AUTO: 9.5 % — SIGNIFICANT CHANGE UP (ref 2–14)
NEUTROPHILS # BLD AUTO: 3.32 K/UL — SIGNIFICANT CHANGE UP (ref 1.8–7.4)
NEUTROPHILS NFR BLD AUTO: 51.4 % — SIGNIFICANT CHANGE UP (ref 43–77)
PHOSPHATE SERPL-MCNC: 3.2 MG/DL — SIGNIFICANT CHANGE UP (ref 2.5–4.5)
PLATELET # BLD AUTO: 134 K/UL — LOW (ref 150–400)
POTASSIUM SERPL-MCNC: 4.5 MMOL/L — SIGNIFICANT CHANGE UP (ref 3.5–5.3)
POTASSIUM SERPL-SCNC: 4.5 MMOL/L — SIGNIFICANT CHANGE UP (ref 3.5–5.3)
RBC # BLD: 4.95 M/UL — SIGNIFICANT CHANGE UP (ref 4.2–5.8)
RBC # FLD: 14 % — SIGNIFICANT CHANGE UP (ref 10.3–14.5)
SODIUM SERPL-SCNC: 140 MMOL/L — SIGNIFICANT CHANGE UP (ref 135–145)
TROPONIN I SERPL-MCNC: 0.35 NG/ML — HIGH (ref 0.01–0.04)
WBC # BLD: 6.44 K/UL — SIGNIFICANT CHANGE UP (ref 3.8–10.5)
WBC # FLD AUTO: 6.44 K/UL — SIGNIFICANT CHANGE UP (ref 3.8–10.5)

## 2019-11-28 PROCEDURE — 99233 SBSQ HOSP IP/OBS HIGH 50: CPT

## 2019-11-28 RX ORDER — CLOPIDOGREL BISULFATE 75 MG/1
75 TABLET, FILM COATED ORAL DAILY
Refills: 0 | Status: DISCONTINUED | OUTPATIENT
Start: 2019-11-28 | End: 2019-11-29

## 2019-11-28 RX ADMIN — CLOPIDOGREL BISULFATE 75 MILLIGRAM(S): 75 TABLET, FILM COATED ORAL at 11:05

## 2019-11-28 RX ADMIN — Medication 50 MILLIGRAM(S): at 05:22

## 2019-11-28 RX ADMIN — Medication 4: at 21:20

## 2019-11-28 RX ADMIN — Medication 2: at 09:26

## 2019-11-28 RX ADMIN — Medication 400 INTERNATIONAL UNIT(S): at 18:07

## 2019-11-28 RX ADMIN — SIMVASTATIN 40 MILLIGRAM(S): 20 TABLET, FILM COATED ORAL at 21:20

## 2019-11-28 RX ADMIN — Medication 162 MILLIGRAM(S): at 11:04

## 2019-11-28 RX ADMIN — PANTOPRAZOLE SODIUM 40 MILLIGRAM(S): 20 TABLET, DELAYED RELEASE ORAL at 05:22

## 2019-11-28 RX ADMIN — Medication 50 MILLIGRAM(S): at 18:10

## 2019-11-28 NOTE — PROGRESS NOTE ADULT - SUBJECTIVE AND OBJECTIVE BOX
INTERVAL HPI/OVERNIGHT EVENTS:  70 y/o M PMHx CAD s/p stents x4 (last cath ), GERD, HLD, DM presented to ED with complaint of chest pain. Patient states chest pain has been intermittent for past 3 days. States pain usually lasts for about 15 minutes and subsides on its own. Patient denies any aggravating factors. States last night pain became severe and was improved after taking nitroglycerin so he came to ED for further evaluation. Patient states pain is located in the middle of his chest and sometimes will radiate to R arm.  Patient denies any SOB, but does admit to SOB on exertion for the past few weeks. Denies any abd pain, N/V/D/C.    In ED, patient received ASA 325mg x1, CXR done negative for acute cardiopulmonary process. Patient labs significant for elevation in troponin- 0.155-->0.305-->0.384. EKG showing NSR @ 79bpm     19- Patient seen and examined at bedside. Overnight, patient with episode of chest pain with radiation to R arm, improved after nitroglycerin. Patient states pain lasted for about 30 minutes. Currently denies any CP, SOB, abd pain. Case discussed with Cardiology, patient to go for cardiac cath today.    MEDICATIONS  (STANDING):  aspirin enteric coated 162 milliGRAM(s) Oral daily  clopidogrel Tablet 75 milliGRAM(s) Oral daily  dextrose 5%. 1000 milliLiter(s) (50 mL/Hr) IV Continuous <Continuous>  dextrose 50% Injectable 12.5 Gram(s) IV Push once  dextrose 50% Injectable 25 Gram(s) IV Push once  dextrose 50% Injectable 25 Gram(s) IV Push once  insulin lispro (HumaLOG) corrective regimen sliding scale   SubCutaneous every 6 hours  metoprolol tartrate 50 milliGRAM(s) Oral two times a day  pantoprazole    Tablet 40 milliGRAM(s) Oral before breakfast  simvastatin 40 milliGRAM(s) Oral at bedtime  vitamin E 400 International Unit(s) Oral daily    MEDICATIONS  (PRN):  acetaminophen   Tablet .. 650 milliGRAM(s) Oral every 6 hours PRN Mild Pain (1 - 3)  dextrose 40% Gel 15 Gram(s) Oral once PRN Blood Glucose LESS THAN 70 milliGRAM(s)/deciliter  glucagon  Injectable 1 milliGRAM(s) IntraMuscular once PRN Glucose LESS THAN 70 milligrams/deciliter  nitroglycerin     SubLingual 0.4 milliGRAM(s) SubLingual every 5 minutes PRN Chest Pain      Allergies    No Known Allergies    Intolerances      ROS:  CONSTITUTIONAL: No weakness, fevers or chills  EYES/ENT: No visual changes;  No vertigo or throat pain   NECK: No pain or stiffness  RESPIRATORY: No cough, wheezing, hemoptysis; No shortness of breath  CARDIOVASCULAR: +chest pain   GASTROINTESTINAL: No abdominal or epigastric pain. No nausea, vomiting, or hematemesis.  GENITOURINARY: No dysuria, frequency or hematuria  NEUROLOGICAL: No numbness or weakness  SKIN: No itching, burning, rashes, or lesions   All other review of systems is negative unless indicated above.    Vital Signs Last 24 Hrs  T(C): 36.5 (2019 10:24), Max: 36.7 (2019 12:38)  T(F): 97.7 (2019 10:24), Max: 98.1 (2019 12:38)  HR: 61 (2019 10:24) (59 - 79)  BP: 128/64 (2019 10:24) (102/52 - 173/73)  BP(mean): --  RR: 18 (2019 10:24) (18 - 20)  SpO2: 97% (2019 10:24) (94% - 97%)      Physical Exam:  General: WN/WD NAD  Neurology: A&Ox3, nonfocal, MACK x 4  Respiratory: CTA B/L  CV: RRR, S1S2, no murmurs, rubs or gallops  Abdominal: Soft, NT, ND +BS  Extremities: No edema, + peripheral pulses      LABS:                        12.9   6.44  )-----------( 134      ( 2019 08:15 )             41.2         140  |  107  |  18  ----------------------------<  208<H>  4.5   |  25  |  1.17    Ca    8.7      2019 08:15  Phos  3.2       Mg     2.2         TPro  7.6  /  Alb  3.3  /  TBili  0.3  /  DBili  x   /  AST  24  /  ALT  34  /  AlkPhos  166<H>  -    PT/INR - ( 2019 23:06 )   PT: 10.9 sec;   INR: 0.98 ratio         PTT - ( 2019 23:06 )  PTT:29.5 sec  Urinalysis Basic - ( 2019 01:27 )    Color: Yellow / Appearance: Clear / S.015 / pH: x  Gluc: x / Ketone: Negative  / Bili: Negative / Urobili: Negative mg/dL   Blood: x / Protein: Negative mg/dL / Nitrite: Negative   Leuk Esterase: Negative / RBC: x / WBC x   Sq Epi: x / Non Sq Epi: x / Bacteria: x        RADIOLOGY & ADDITIONAL TESTS:

## 2019-11-28 NOTE — PROGRESS NOTE ADULT - ASSESSMENT
#Chest pain  #ACS r/o MI  #Elevated troponins  #HTN, HLD, GERD  #DM  #CAD s/p stents    Plan:  -Serial enzymes and EKG  -Cardio consult- recommend cardiac cath today  -Check TTE- no wall abnormalities noted  -Continue home medications  -Hold oral hypoglycemics, monitor FS Q6 when NPO and AC and HS when on diet, mod ISS  -DVT ppx- SCDs and ambulation  -Patient for cardiac cath today, discussed with Cardio, plavix started

## 2019-11-28 NOTE — PROGRESS NOTE ADULT - SUBJECTIVE AND OBJECTIVE BOX
HPI:  70 y/o M PMHx CAD s/p stents x4 (last cath ), GERD, HLD, DM presented to ED with complaint of chest pain. Patient states chest pain has been intermittent for past 3 days. States pain usually lasts for about 15 minutes and subsides on its own. Patient denies any aggravating factors. States last night pain became severe and was improved after taking nitroglycerin so he came to ED for further evaluation. Patient states pain is located in the middle of his chest and sometimes will radiate to R arm.  Patient denies any SOB, but does admit to SOB on exertion for the past few weeks. Denies any abd pain, N/V/D/C.    In ED, patient received ASA 325mg x1, CXR done negative for acute cardiopulmonary process. Patient labs significant for elevation in troponin- 0.155-->0.305-->0.384. EKG showing NSR @ 79bpm (2019 09:25)    : consulted for chest pain & borderline trop elevation.  Pt reports episodes of pressure type chest pain over past 2-3 days.  Worse w/ exertion, relieved w/ rest & NTG.  No atypical features.  Also w/ chest pain today in hospital, brief in duration.  19  Events noted , had recurrent chest pain last night , elevated troponin , relieved with NTG         PAST MEDICAL & SURGICAL HISTORY:  Osteoporosis  DJD (degenerative joint disease)  CAD (coronary artery disease)  HTN (hypertension)  GERD (gastroesophageal reflux disease)  Hyperlipidemia  Hepatitis B  Heartburn  Diabetes 1.5, managed as type 2  Arteriosclerosis  Status post cataract extraction: left eye done on 10/2/2019  History of cardiac catheterization: with 4 stents ,,    MEDICATIONS  (STANDING):  aspirin enteric coated 162 milliGRAM(s) Oral daily  clopidogrel Tablet 75 milliGRAM(s) Oral daily  dextrose 5%. 1000 milliLiter(s) (50 mL/Hr) IV Continuous <Continuous>  dextrose 50% Injectable 12.5 Gram(s) IV Push once  dextrose 50% Injectable 25 Gram(s) IV Push once  dextrose 50% Injectable 25 Gram(s) IV Push once  insulin lispro (HumaLOG) corrective regimen sliding scale   SubCutaneous every 6 hours  metoprolol tartrate 50 milliGRAM(s) Oral two times a day  pantoprazole    Tablet 40 milliGRAM(s) Oral before breakfast  simvastatin 40 milliGRAM(s) Oral at bedtime  vitamin E 400 International Unit(s) Oral daily    MEDICATIONS  (PRN):  acetaminophen   Tablet .. 650 milliGRAM(s) Oral every 6 hours PRN Mild Pain (1 - 3)  dextrose 40% Gel 15 Gram(s) Oral once PRN Blood Glucose LESS THAN 70 milliGRAM(s)/deciliter  glucagon  Injectable 1 milliGRAM(s) IntraMuscular once PRN Glucose LESS THAN 70 milligrams/deciliter  nitroglycerin     SubLingual 0.4 milliGRAM(s) SubLingual every 5 minutes PRN Chest Pain    REVIEW OF SYSTEMS:    CONSTITUTIONAL: No weakness, fevers or chills  EYES/ENT: No visual changes;  No vertigo or throat pain   NECK: No pain or stiffness  RESPIRATORY: No cough, wheezing, hemoptysis; No shortness of breath  GASTROINTESTINAL: No abdominal or epigastric pain. No nausea, vomiting, or hematemesis; No diarrhea or constipation. No melena or hematochezia.  GENITOURINARY: No dysuria, frequency or hematuria  NEUROLOGICAL: No numbness or weakness  SKIN: No itching, burning, rashes, or lesions   All other review of systems is negative unless indicated above    Vital Signs Last 24 Hrs  T(C): 36.6 (2019 05:15), Max: 36.7 (2019 12:38)  T(F): 97.9 (2019 05:15), Max: 98.1 (2019 12:38)  HR: 67 (2019 05:15) (59 - 79)  BP: 121/70 (2019 05:15) (102/52 - 173/73)  BP(mean): --  RR: 18 (2019 05:15) (18 - 20)  SpO2: 97% (2019 05:15) (94% - 97%)    I&O's Summary      PHYSICAL EXAM:    Constitutional: NAD, awake and alert, well-developed  HEENT: PERR, EOMI,  No oral cyananosis.  Neck:  supple,  No JVD  Respiratory: Breath sounds are clear bilaterally, No wheezing, rales or rhonchi  Cardiovascular: S1 and S2, regular rate and rhythm, no Murmurs, gallops or rubs  Gastrointestinal: Bowel Sounds present, soft, nontender.   Extremities: No peripheral edema. No clubbing or cyanosis.  Vascular: 2+ peripheral pulses  Neurological: A/O x 3, no focal deficits  Musculoskeletal: no calf tenderness.  Skin: No rashes.      LABS: All Labs Reviewed:                         12.9   6.44  )-----------( 134      ( 2019 08:15 )             41.2         140  |  107  |  18  ----------------------------<  208<H>  4.5   |  25  |  1.17    Ca    8.7      2019 08:15  Phos  3.2       Mg     2.2         TPro  7.6  /  Alb  3.3  /  TBili  0.3  /  DBili  x   /  AST  24  /  ALT  34  /  AlkPhos  166<H>      CARDIAC MARKERS ( 2019 08:15 )  0.391 ng/mL / x     / 62 U/L / x     / x      CARDIAC MARKERS ( 2019 00:33 )  0.346 ng/mL / x     / 62 U/L / x     / x      CARDIAC MARKERS ( 2019 16:27 )  0.448 ng/mL / x     / 68 U/L / x     / x      CARDIAC MARKERS ( 2019 08:25 )  0.384 ng/mL / x     / 63 U/L / x     / x      CARDIAC MARKERS ( 2019 02:24 )  0.305 ng/mL / x     / x     / x     / x      CARDIAC MARKERS ( 2019 23:06 )  0.155 ng/mL / x     / x     / x     / x          LIVER FUNCTIONS - ( 2019 23:06 )  Alb: 3.3 g/dL / Pro: 7.6 gm/dL / ALK PHOS: 166 U/L / ALT: 34 U/L / AST: 24 U/L / GGT: x           PT/INR - ( 2019 23:06 )   PT: 10.9 sec;   INR: 0.98 ratio         PTT - ( 2019 23:06 )  PTT:29.5 sec  Urinalysis Basic - ( 2019 01:27 )    Color: Yellow / Appearance: Clear / S.015 / pH: x  Gluc: x / Ketone: Negative  / Bili: Negative / Urobili: Negative mg/dL   Blood: x / Protein: Negative mg/dL / Nitrite: Negative   Leuk Esterase: Negative / RBC: x / WBC x   Sq Epi: x / Non Sq Epi: x / Bacteria: x          RADIOLOGY/EKG:  ECG- NSR no ischemic changes.    < from: Transthoracic Echocardiogram (19 @ 11:03) >   Summary     Fibrocalcific changes noted to the mitral valve leaflets with preserved   leaflet excursion.   Trace mitral regurgitation is present.   Fibrocalcific changes noted to the Aortic valve leaflets with preserved   leaflet excursion.   Mild (1+) aortic regurgitation is present.   The left ventricle is normal in size, wall thickness, wall motion and   contractility.   Estimated left ventricular ejection fraction is 65-70 %.     Signature     ----------------------------------------------------------------   Electronically signed by Esteban Cedeno MD(Interpreting   physician) on 2019 03:26 PM   ----------------------------------------------------------------    < end of copied text >      Monitor sinus rhythm      Sinus rhythm non specific ST changes

## 2019-11-28 NOTE — CHART NOTE - NSCHARTNOTEFT_GEN_A_CORE
Called by RN to inform of patient c/o chest pain with radiation to R arm. Patient seen in bed resting comfortably in no apparent distress, has no complaints at this time, stated that he started with left sided chest pain, of ~7/10, with radiation to R arm.     Vital Signs Last 24 Hrs  T(C): 36.7 (27 Nov 2019 23:42), Max: 36.7 (27 Nov 2019 12:38)  T(F): 98 (27 Nov 2019 23:42), Max: 98.1 (27 Nov 2019 12:38)  HR: 79 (27 Nov 2019 23:42) (59 - 79)  BP: 173/73 (27 Nov 2019 23:42) (102/52 - 173/73)  BP(mean): 76 (27 Nov 2019 02:07) (76 - 76)  RR: 20 (27 Nov 2019 23:42) (18 - 20)  SpO2: 96% (27 Nov 2019 23:42) (94% - 96%)    PHYSICAL EXAM:  CHEST/LUNG: Clear to auscultation bilaterally; No rales, rhonchi, wheezing, or rubs. Unlabored respirations  HEART: Regular rate and rhythm; No murmurs, rubs, or gallops  NERVOUS SYSTEM:  Alert & Oriented X3, speech clear. No deficits       -EKG reviewed  -No event son TELE  -Troponin trending down   -relief of pain after Nitroglycerin administration  -Recheck vital signs  -Continue to monitor      Above discussed with Dr. Padilla -PGY-3

## 2019-11-28 NOTE — PROGRESS NOTE ADULT - PROBLEM SELECTOR PLAN 1
recurrent angina at rest with elevated troponin  with fatigue worsening with minimal activity ,  cardiac cath , called dr linton , add plavix ,  continue statin , ecotrin ,  follow up echo DISPLAY PLAN FREE TEXT

## 2019-11-29 ENCOUNTER — INPATIENT (INPATIENT)
Facility: HOSPITAL | Age: 69
LOS: 9 days | Discharge: HOME HEALTH | DRG: 236 | End: 2019-12-09
Attending: THORACIC SURGERY (CARDIOTHORACIC VASCULAR SURGERY) | Admitting: THORACIC SURGERY (CARDIOTHORACIC VASCULAR SURGERY)
Payer: MEDICARE

## 2019-11-29 ENCOUNTER — TRANSCRIPTION ENCOUNTER (OUTPATIENT)
Age: 69
End: 2019-11-29

## 2019-11-29 VITALS
HEART RATE: 62 BPM | OXYGEN SATURATION: 97 % | RESPIRATION RATE: 18 BRPM | DIASTOLIC BLOOD PRESSURE: 78 MMHG | TEMPERATURE: 98 F | SYSTOLIC BLOOD PRESSURE: 138 MMHG

## 2019-11-29 VITALS
DIASTOLIC BLOOD PRESSURE: 54 MMHG | SYSTOLIC BLOOD PRESSURE: 120 MMHG | OXYGEN SATURATION: 100 % | HEART RATE: 57 BPM | RESPIRATION RATE: 18 BRPM | TEMPERATURE: 98 F

## 2019-11-29 DIAGNOSIS — E11.9 TYPE 2 DIABETES MELLITUS WITHOUT COMPLICATIONS: ICD-10-CM

## 2019-11-29 DIAGNOSIS — I10 ESSENTIAL (PRIMARY) HYPERTENSION: ICD-10-CM

## 2019-11-29 DIAGNOSIS — Z98.890 OTHER SPECIFIED POSTPROCEDURAL STATES: Chronic | ICD-10-CM

## 2019-11-29 DIAGNOSIS — Z98.49 CATARACT EXTRACTION STATUS, UNSPECIFIED EYE: Chronic | ICD-10-CM

## 2019-11-29 DIAGNOSIS — I25.118 ATHEROSCLEROTIC HEART DISEASE OF NATIVE CORONARY ARTERY WITH OTHER FORMS OF ANGINA PECTORIS: ICD-10-CM

## 2019-11-29 DIAGNOSIS — I25.10 ATHEROSCLEROTIC HEART DISEASE OF NATIVE CORONARY ARTERY WITHOUT ANGINA PECTORIS: ICD-10-CM

## 2019-11-29 DIAGNOSIS — K21.9 GASTRO-ESOPHAGEAL REFLUX DISEASE WITHOUT ESOPHAGITIS: ICD-10-CM

## 2019-11-29 DIAGNOSIS — E78.49 OTHER HYPERLIPIDEMIA: ICD-10-CM

## 2019-11-29 LAB
ALBUMIN SERPL ELPH-MCNC: 4 G/DL — SIGNIFICANT CHANGE UP (ref 3.3–5.2)
ALP SERPL-CCNC: 90 U/L — SIGNIFICANT CHANGE UP (ref 40–120)
ALT FLD-CCNC: 28 U/L — SIGNIFICANT CHANGE UP
ANION GAP SERPL CALC-SCNC: 13 MMOL/L — SIGNIFICANT CHANGE UP (ref 5–17)
ANION GAP SERPL CALC-SCNC: 7 MMOL/L — SIGNIFICANT CHANGE UP (ref 5–17)
APPEARANCE UR: CLEAR — SIGNIFICANT CHANGE UP
APTT BLD: 28.7 SEC — SIGNIFICANT CHANGE UP (ref 27.5–36.3)
AST SERPL-CCNC: 30 U/L — SIGNIFICANT CHANGE UP
BASOPHILS # BLD AUTO: 0.03 K/UL — SIGNIFICANT CHANGE UP (ref 0–0.2)
BASOPHILS NFR BLD AUTO: 0.4 % — SIGNIFICANT CHANGE UP (ref 0–2)
BILIRUB SERPL-MCNC: 0.4 MG/DL — SIGNIFICANT CHANGE UP (ref 0.4–2)
BILIRUB UR-MCNC: NEGATIVE — SIGNIFICANT CHANGE UP
BLD GP AB SCN SERPL QL: SIGNIFICANT CHANGE UP
BUN SERPL-MCNC: 18 MG/DL — SIGNIFICANT CHANGE UP (ref 8–20)
BUN SERPL-MCNC: 19 MG/DL — SIGNIFICANT CHANGE UP (ref 7–23)
CALCIUM SERPL-MCNC: 8.7 MG/DL — SIGNIFICANT CHANGE UP (ref 8.5–10.1)
CALCIUM SERPL-MCNC: 8.9 MG/DL — SIGNIFICANT CHANGE UP (ref 8.6–10.2)
CHLORIDE SERPL-SCNC: 103 MMOL/L — SIGNIFICANT CHANGE UP (ref 96–108)
CHLORIDE SERPL-SCNC: 99 MMOL/L — SIGNIFICANT CHANGE UP (ref 98–107)
CO2 SERPL-SCNC: 25 MMOL/L — SIGNIFICANT CHANGE UP (ref 22–29)
CO2 SERPL-SCNC: 27 MMOL/L — SIGNIFICANT CHANGE UP (ref 22–31)
COLOR SPEC: YELLOW — SIGNIFICANT CHANGE UP
CREAT SERPL-MCNC: 0.95 MG/DL — SIGNIFICANT CHANGE UP (ref 0.5–1.3)
CREAT SERPL-MCNC: 1.14 MG/DL — SIGNIFICANT CHANGE UP (ref 0.5–1.3)
DIFF PNL FLD: NEGATIVE — SIGNIFICANT CHANGE UP
EOSINOPHIL # BLD AUTO: 0.21 K/UL — SIGNIFICANT CHANGE UP (ref 0–0.5)
EOSINOPHIL NFR BLD AUTO: 3.1 % — SIGNIFICANT CHANGE UP (ref 0–6)
GLUCOSE BLDC GLUCOMTR-MCNC: 248 MG/DL — HIGH (ref 70–99)
GLUCOSE SERPL-MCNC: 216 MG/DL — HIGH (ref 70–115)
GLUCOSE SERPL-MCNC: 222 MG/DL — HIGH (ref 70–99)
GLUCOSE UR QL: 250 MG/DL
HCT VFR BLD CALC: 41.9 % — SIGNIFICANT CHANGE UP (ref 39–50)
HCT VFR BLD CALC: 42.6 % — SIGNIFICANT CHANGE UP (ref 39–50)
HGB BLD-MCNC: 13.2 G/DL — SIGNIFICANT CHANGE UP (ref 13–17)
HGB BLD-MCNC: 13.3 G/DL — SIGNIFICANT CHANGE UP (ref 13–17)
IMM GRANULOCYTES NFR BLD AUTO: 0.3 % — SIGNIFICANT CHANGE UP (ref 0–1.5)
INR BLD: 1 RATIO — SIGNIFICANT CHANGE UP (ref 0.88–1.16)
KETONES UR-MCNC: NEGATIVE — SIGNIFICANT CHANGE UP
LEUKOCYTE ESTERASE UR-ACNC: NEGATIVE — SIGNIFICANT CHANGE UP
LYMPHOCYTES # BLD AUTO: 2.66 K/UL — SIGNIFICANT CHANGE UP (ref 1–3.3)
LYMPHOCYTES # BLD AUTO: 38.8 % — SIGNIFICANT CHANGE UP (ref 13–44)
MCHC RBC-ENTMCNC: 25.8 PG — LOW (ref 27–34)
MCHC RBC-ENTMCNC: 26 PG — LOW (ref 27–34)
MCHC RBC-ENTMCNC: 31 GM/DL — LOW (ref 32–36)
MCHC RBC-ENTMCNC: 31.7 GM/DL — LOW (ref 32–36)
MCV RBC AUTO: 82 FL — SIGNIFICANT CHANGE UP (ref 80–100)
MCV RBC AUTO: 83.2 FL — SIGNIFICANT CHANGE UP (ref 80–100)
MONOCYTES # BLD AUTO: 0.59 K/UL — SIGNIFICANT CHANGE UP (ref 0–0.9)
MONOCYTES NFR BLD AUTO: 8.6 % — SIGNIFICANT CHANGE UP (ref 2–14)
NEUTROPHILS # BLD AUTO: 3.34 K/UL — SIGNIFICANT CHANGE UP (ref 1.8–7.4)
NEUTROPHILS NFR BLD AUTO: 48.8 % — SIGNIFICANT CHANGE UP (ref 43–77)
NITRITE UR-MCNC: NEGATIVE — SIGNIFICANT CHANGE UP
NT-PROBNP SERPL-SCNC: 91 PG/ML — SIGNIFICANT CHANGE UP (ref 0–300)
PA ADP PRP-ACNC: 260 PRU — SIGNIFICANT CHANGE UP (ref 180–376)
PH UR: 6.5 — SIGNIFICANT CHANGE UP (ref 5–8)
PLATELET # BLD AUTO: 151 K/UL — SIGNIFICANT CHANGE UP (ref 150–400)
PLATELET # BLD AUTO: 151 K/UL — SIGNIFICANT CHANGE UP (ref 150–400)
POTASSIUM SERPL-MCNC: 4.3 MMOL/L — SIGNIFICANT CHANGE UP (ref 3.5–5.3)
POTASSIUM SERPL-MCNC: 4.6 MMOL/L — SIGNIFICANT CHANGE UP (ref 3.5–5.3)
POTASSIUM SERPL-SCNC: 4.3 MMOL/L — SIGNIFICANT CHANGE UP (ref 3.5–5.3)
POTASSIUM SERPL-SCNC: 4.6 MMOL/L — SIGNIFICANT CHANGE UP (ref 3.5–5.3)
PROT SERPL-MCNC: 7.5 G/DL — SIGNIFICANT CHANGE UP (ref 6.6–8.7)
PROT UR-MCNC: NEGATIVE MG/DL — SIGNIFICANT CHANGE UP
PROTHROM AB SERPL-ACNC: 11.5 SEC — SIGNIFICANT CHANGE UP (ref 10–12.9)
RBC # BLD: 5.11 M/UL — SIGNIFICANT CHANGE UP (ref 4.2–5.8)
RBC # BLD: 5.12 M/UL — SIGNIFICANT CHANGE UP (ref 4.2–5.8)
RBC # FLD: 13.8 % — SIGNIFICANT CHANGE UP (ref 10.3–14.5)
RBC # FLD: 14 % — SIGNIFICANT CHANGE UP (ref 10.3–14.5)
SODIUM SERPL-SCNC: 137 MMOL/L — SIGNIFICANT CHANGE UP (ref 135–145)
SODIUM SERPL-SCNC: 137 MMOL/L — SIGNIFICANT CHANGE UP (ref 135–145)
SP GR SPEC: 1.01 — SIGNIFICANT CHANGE UP (ref 1.01–1.02)
TSH SERPL-MCNC: 2.71 UIU/ML — SIGNIFICANT CHANGE UP (ref 0.27–4.2)
UROBILINOGEN FLD QL: NEGATIVE MG/DL — SIGNIFICANT CHANGE UP
WBC # BLD: 6.85 K/UL — SIGNIFICANT CHANGE UP (ref 3.8–10.5)
WBC # BLD: 6.9 K/UL — SIGNIFICANT CHANGE UP (ref 3.8–10.5)
WBC # FLD AUTO: 6.85 K/UL — SIGNIFICANT CHANGE UP (ref 3.8–10.5)
WBC # FLD AUTO: 6.9 K/UL — SIGNIFICANT CHANGE UP (ref 3.8–10.5)

## 2019-11-29 PROCEDURE — 93880 EXTRACRANIAL BILAT STUDY: CPT | Mod: 26

## 2019-11-29 PROCEDURE — 99222 1ST HOSP IP/OBS MODERATE 55: CPT

## 2019-11-29 PROCEDURE — 93010 ELECTROCARDIOGRAM REPORT: CPT

## 2019-11-29 PROCEDURE — 71045 X-RAY EXAM CHEST 1 VIEW: CPT | Mod: 26

## 2019-11-29 RX ORDER — LINAGLIPTIN 5 MG/1
1 TABLET, FILM COATED ORAL
Qty: 0 | Refills: 0 | DISCHARGE

## 2019-11-29 RX ORDER — DEXTROSE 50 % IN WATER 50 %
12.5 SYRINGE (ML) INTRAVENOUS ONCE
Refills: 0 | Status: DISCONTINUED | OUTPATIENT
Start: 2019-11-29 | End: 2019-12-03

## 2019-11-29 RX ORDER — SIMVASTATIN 20 MG/1
20 TABLET, FILM COATED ORAL AT BEDTIME
Refills: 0 | Status: DISCONTINUED | OUTPATIENT
Start: 2019-11-29 | End: 2019-12-03

## 2019-11-29 RX ORDER — PANTOPRAZOLE SODIUM 20 MG/1
40 TABLET, DELAYED RELEASE ORAL
Refills: 0 | Status: DISCONTINUED | OUTPATIENT
Start: 2019-11-29 | End: 2019-12-03

## 2019-11-29 RX ORDER — SODIUM CHLORIDE 9 MG/ML
3 INJECTION INTRAMUSCULAR; INTRAVENOUS; SUBCUTANEOUS EVERY 8 HOURS
Refills: 0 | Status: DISCONTINUED | OUTPATIENT
Start: 2019-11-29 | End: 2019-12-03

## 2019-11-29 RX ORDER — INSULIN GLARGINE 100 [IU]/ML
10 INJECTION, SOLUTION SUBCUTANEOUS AT BEDTIME
Refills: 0 | Status: DISCONTINUED | OUTPATIENT
Start: 2019-11-29 | End: 2019-12-02

## 2019-11-29 RX ORDER — ACETAMINOPHEN 500 MG
650 TABLET ORAL EVERY 6 HOURS
Refills: 0 | Status: DISCONTINUED | OUTPATIENT
Start: 2019-11-29 | End: 2019-12-03

## 2019-11-29 RX ORDER — DEXTROSE 50 % IN WATER 50 %
15 SYRINGE (ML) INTRAVENOUS ONCE
Refills: 0 | Status: DISCONTINUED | OUTPATIENT
Start: 2019-11-29 | End: 2019-12-03

## 2019-11-29 RX ORDER — SODIUM CHLORIDE 9 MG/ML
1000 INJECTION, SOLUTION INTRAVENOUS
Refills: 0 | Status: DISCONTINUED | OUTPATIENT
Start: 2019-11-29 | End: 2019-12-03

## 2019-11-29 RX ORDER — VITAMIN E 100 UNIT
1 CAPSULE ORAL
Qty: 0 | Refills: 0 | DISCHARGE

## 2019-11-29 RX ORDER — INSULIN LISPRO 100/ML
3 VIAL (ML) SUBCUTANEOUS
Refills: 0 | Status: DISCONTINUED | OUTPATIENT
Start: 2019-11-29 | End: 2019-12-01

## 2019-11-29 RX ORDER — INSULIN LISPRO 100/ML
VIAL (ML) SUBCUTANEOUS
Refills: 0 | Status: DISCONTINUED | OUTPATIENT
Start: 2019-11-29 | End: 2019-12-03

## 2019-11-29 RX ORDER — METFORMIN HYDROCHLORIDE 850 MG/1
2 TABLET ORAL
Qty: 0 | Refills: 0 | DISCHARGE

## 2019-11-29 RX ORDER — DEXTROSE 50 % IN WATER 50 %
25 SYRINGE (ML) INTRAVENOUS ONCE
Refills: 0 | Status: DISCONTINUED | OUTPATIENT
Start: 2019-11-29 | End: 2019-12-03

## 2019-11-29 RX ORDER — DULAGLUTIDE 4.5 MG/.5ML
0.75 INJECTION, SOLUTION SUBCUTANEOUS
Qty: 0 | Refills: 0 | DISCHARGE

## 2019-11-29 RX ORDER — ACETAMINOPHEN 500 MG
2 TABLET ORAL
Qty: 0 | Refills: 0 | DISCHARGE
Start: 2019-11-29

## 2019-11-29 RX ORDER — INSULIN LISPRO 100/ML
VIAL (ML) SUBCUTANEOUS
Refills: 0 | Status: DISCONTINUED | OUTPATIENT
Start: 2019-11-29 | End: 2019-11-29

## 2019-11-29 RX ORDER — METOPROLOL TARTRATE 50 MG
50 TABLET ORAL
Refills: 0 | Status: DISCONTINUED | OUTPATIENT
Start: 2019-11-29 | End: 2019-12-02

## 2019-11-29 RX ORDER — GLUCAGON INJECTION, SOLUTION 0.5 MG/.1ML
1 INJECTION, SOLUTION SUBCUTANEOUS ONCE
Refills: 0 | Status: DISCONTINUED | OUTPATIENT
Start: 2019-11-29 | End: 2019-12-03

## 2019-11-29 RX ORDER — HEPARIN SODIUM 5000 [USP'U]/ML
INJECTION INTRAVENOUS; SUBCUTANEOUS
Qty: 25000 | Refills: 0 | Status: DISCONTINUED | OUTPATIENT
Start: 2019-11-29 | End: 2019-12-03

## 2019-11-29 RX ORDER — ASPIRIN/CALCIUM CARB/MAGNESIUM 324 MG
81 TABLET ORAL DAILY
Refills: 0 | Status: DISCONTINUED | OUTPATIENT
Start: 2019-11-29 | End: 2019-12-03

## 2019-11-29 RX ORDER — INSULIN LISPRO 100/ML
0 VIAL (ML) SUBCUTANEOUS
Qty: 0 | Refills: 0 | DISCHARGE
Start: 2019-11-29

## 2019-11-29 RX ADMIN — Medication 50 MILLIGRAM(S): at 18:04

## 2019-11-29 RX ADMIN — Medication 4: at 08:24

## 2019-11-29 RX ADMIN — PANTOPRAZOLE SODIUM 40 MILLIGRAM(S): 20 TABLET, DELAYED RELEASE ORAL at 05:01

## 2019-11-29 RX ADMIN — SODIUM CHLORIDE 3 MILLILITER(S): 9 INJECTION INTRAMUSCULAR; INTRAVENOUS; SUBCUTANEOUS at 21:05

## 2019-11-29 RX ADMIN — HEPARIN SODIUM 1000 UNIT(S)/HR: 5000 INJECTION INTRAVENOUS; SUBCUTANEOUS at 21:06

## 2019-11-29 RX ADMIN — Medication 4: at 21:21

## 2019-11-29 RX ADMIN — Medication 162 MILLIGRAM(S): at 12:09

## 2019-11-29 RX ADMIN — INSULIN GLARGINE 10 UNIT(S): 100 INJECTION, SOLUTION SUBCUTANEOUS at 21:21

## 2019-11-29 RX ADMIN — SIMVASTATIN 20 MILLIGRAM(S): 20 TABLET, FILM COATED ORAL at 21:10

## 2019-11-29 RX ADMIN — Medication 50 MILLIGRAM(S): at 05:01

## 2019-11-29 NOTE — H&P ADULT - PROBLEM SELECTOR PLAN 4
A1C 10.3.  LOLA AC/HS   Premeal humalog 3 units.    Lantus 10 units.  Endocrine consult called and pending.

## 2019-11-29 NOTE — DISCHARGE NOTE PROVIDER - CARE PROVIDER_API CALL
Palla, Venugopal R (MD)  Cardiovascular Disease; Internal Medicine  [address], [place] [id number]  Phone: [phone]  Fax: [phone]  Follow Up Time: 2 weeks

## 2019-11-29 NOTE — H&P ADULT - PROBLEM SELECTOR PLAN 1
Transferred from  with triple vessel CAD.  Admit to Dr. Pires.  Plan for OR on Monday for CABG.  Preop workup ordered.  Heparin gtt.  DASH/TLC diet

## 2019-11-29 NOTE — DISCHARGE NOTE PROVIDER - NSDCMRMEDTOKEN_GEN_ALL_CORE_FT
acetaminophen 325 mg oral tablet: 2 tab(s) orally every 6 hours, As needed, Mild Pain (1 - 3)  aspirin 81 mg oral delayed release tablet: 2 tab(s) orally once a day  insulin lispro: Sliding scale- before meals and at bedtime:  2 Unit(s) if Glucose 151 - 200  4 Unit(s) if Glucose 201 - 250  6 Unit(s) if Glucose 251 - 300  8 Unit(s) if Glucose 301 - 350  10 Unit(s) if Glucose 351 - 400  12 Unit(s) if Glucose Greater Than 400  Metoprolol Tartrate 50 mg oral tablet: 1 tab(s) orally 2 times a day  Protonix 40 mg oral delayed release tablet: 1 tab(s) orally once a day  simvastatin 20 mg oral tablet: 1 tab(s) orally once a day (at bedtime)

## 2019-11-29 NOTE — H&P ADULT - ASSESSMENT
68 y/o M PMHx CAD s/p stents x4 (last cath 2013), GERD, HLD, DM.  Initially presented to [facility] ED with complaint of chest pain radiating down his right arm since Monday.   States pain usually lasts for about 15 minutes and subsides on its own. Patient denies any aggravating factors. Patient states pain is located in the middle of his chest and sometimes will radiate to R arm.  Patient denies any SOB, but does admit to SOB on exertion for the past few weeks. Denies any abd pain, N/V/D/C.    In ED, patient received ASA 325mg x1, CXR done negative for acute cardiopulmonary process. Patient labs significant for elevation in troponin- 0.155-->0.305-->0.384. EKG showing NSR @ 79bpm     He underwent cardiac cath at [facility] and was found to have triple vessel CAD.  Per the patient, he was given Plavix at [facility] as well at some point.  He was transferred to [facility] for surgical evaluation.

## 2019-11-29 NOTE — DISCHARGE NOTE PROVIDER - HOSPITAL COURSE
68 y/o M PMHx CAD s/p stents x4 (last cath 2013), GERD, HLD, DM presented to ED with complaint of chest pain. Patient states chest pain has been intermittent for past 3 days. States pain usually lasts for about 15 minutes and subsides on its own. Patient denies any aggravating factors. States last night pain became severe and was improved after taking nitroglycerin so he came to ED for further evaluation. Patient states pain is located in the middle of his chest and sometimes will radiate to R arm.  Patient denies any SOB, but does admit to SOB on exertion for the past few weeks. Denies any abd pain, N/V/D/C.        In ED, patient received ASA 325mg x1, CXR done negative for acute cardiopulmonary process. Patient labs significant for elevation in troponin- 0.155-->0.305-->0.384. EKG showing NSR @ 79bpm         11/28/19- Patient seen and examined at bedside. Overnight, patient with episode of chest pain with radiation to R arm, improved after nitroglycerin. Patient states pain lasted for about 30 minutes. Currently denies any CP, SOB, abd pain. Case discussed with Cardiology, patient to go for cardiac cath today.    11/29/19- Patient seen and examined at bedside. Overnight, patient again with chest pain, which resolved after 2 minutes as per patient. Patient had cardiac cath yesterday which showed 3 vessel disease and showed prior stents with claudication, Cardio recommending CABG, patient to be transferred to [facility].        Physical Exam:    General: WN/WD NAD    Neurology: A&Ox3, nonfocal, MACK x 4    Respiratory: CTA B/L    CV: RRR, S1S2, no murmurs, rubs or gallops    Abdominal: Soft, NT, ND +BS    Extremities: No edema, + peripheral pulses            #Chest pain    #ACS r/o MI    #Elevated troponins    #HTN, HLD, GERD    #DM    #CAD s/p stents        Plan:    -Serial enzymes and EKG    -Cardio consult- s/p cardiac cath- 3 vessel disease    -Check TTE- no wall abnormalities noted    -Continue home medications    -Hold oral hypoglycemics, monitor FS Q6 when NPO and AC and HS when on diet, mod ISS    -DVT ppx- SCDs and ambulation    -Patient for cardiac cath - 3 vessel disease, blocked stents- recommending CABG- transfer to [facility]        Total time spent on discharge including coordination of care: 44 minutes

## 2019-11-29 NOTE — H&P ADULT - NSHPREVIEWOFSYSTEMS_GEN_ALL_CORE
Review of Systems  GENERAL:  Fevers[] chills[] sweats[] fatigue[] weight loss[] weight gain []                                      [x ] NEGATIVE  NEURO:  parathesias[] seizures []  syncope []  confusion []                                                                [x ] NEGATIVE                 EYES: glasses[x]  blurry vision[]  discharge[] pain[] glaucoma []                                                           [ ] NEGATIVE                 ENMT:  difficulty hearing []  vertigo[]  dysphagia[] epistaxis[] recent dental work []                     [ x] NEGATIVE                 CV:  chest pain[x] palpitations[] DUBON [] diaphoresis [] edema[]                                                           [ ] NEGATIVE                                 RESPIRATORY:  wheezing[] SOB[x] cough [] sputum[] hemoptysis[]                                                   [ ] NEGATIVE               GI:  nausea[]  vomiting []  diarrhea[] constipation [] melena []                                                          [x ] NEGATIVE            : hematuria[ ]  dysuria[ ] urgency[] incontinence[]                                                                          [x ] NEGATIVE                    MUSKULOSKELETAL:  arthritis[ ]  joint swelling [ ] muscle weakness [ ]                                            [ x] NEGATIVE                     SKIN/BREAST:  rash[ ] itching [ ]  hair loss[ ] masses[ ]                                                                          [ x] NEGATIVE                     PSYCH:  dementia [ ] depresion [ ] anxiety[ ]                                                                                          [x ] NEGATIVE                        HEME/LYMPH:  bruises easily[ ] enlarged lymph nodes[ ] tender lymph nodes[ ]                           [x ] NEGATIVE                      ENDOCRINE:  cold intolerance[ ] heat intolerance[ ] polydipsia[ ]                                                      [ x] NEGATIVE

## 2019-11-29 NOTE — H&P ADULT - NSHPSOCIALHISTORY_GEN_ALL_CORE
Non smoker.  Denies ETOH or illicit drug use.  Lives with his son and daughter in law.  Denies use of assistive device.

## 2019-11-29 NOTE — H&P ADULT - ATTENDING COMMENTS
Above H& P reviewed and independently verified. Known history of CAD with multiple PCI in the past. He had cath done at  11/29 for recent chest pain and elevated Troponin, which revealed TVD. He was then transferred to [facility] for CABG. His echo shows normal LV function. He is pending further testing including PFT.     I discussed with him in detail the risks/benefits of CABG, and he agreed to proceed.

## 2019-11-29 NOTE — DISCHARGE NOTE NURSING/CASE MANAGEMENT/SOCIAL WORK - PATIENT PORTAL LINK FT
You can access the FollowMyHealth Patient Portal offered by [facility] by registering at the following website: [email or web address]. By joining [facility]’s FollowMyHealth portal, you will also be able to view your health information using other applications (apps) compatible with our system.

## 2019-11-29 NOTE — DISCHARGE NOTE PROVIDER - NSDCCPCAREPLAN_GEN_ALL_CORE_FT
PRINCIPAL DISCHARGE DIAGNOSIS  Diagnosis: ACS (acute coronary syndrome)  Assessment and Plan of Treatment: -s/p Cardiac cath showing 3 vessel disease with blocked stents- recommending CABG  -Follow up with physicians at [facility]

## 2019-11-30 LAB
ALBUMIN SERPL ELPH-MCNC: 3.8 G/DL — SIGNIFICANT CHANGE UP (ref 3.3–5.2)
ALP SERPL-CCNC: 84 U/L — SIGNIFICANT CHANGE UP (ref 40–120)
ALT FLD-CCNC: 25 U/L — SIGNIFICANT CHANGE UP
ANION GAP SERPL CALC-SCNC: 13 MMOL/L — SIGNIFICANT CHANGE UP (ref 5–17)
APTT BLD: 57.6 SEC — HIGH (ref 27.5–36.3)
APTT BLD: 57.9 SEC — HIGH (ref 27.5–36.3)
AST SERPL-CCNC: 29 U/L — SIGNIFICANT CHANGE UP
BASOPHILS # BLD AUTO: 0.03 K/UL — SIGNIFICANT CHANGE UP (ref 0–0.2)
BASOPHILS NFR BLD AUTO: 0.4 % — SIGNIFICANT CHANGE UP (ref 0–2)
BILIRUB SERPL-MCNC: 0.4 MG/DL — SIGNIFICANT CHANGE UP (ref 0.4–2)
BUN SERPL-MCNC: 18 MG/DL — SIGNIFICANT CHANGE UP (ref 8–20)
CALCIUM SERPL-MCNC: 8.9 MG/DL — SIGNIFICANT CHANGE UP (ref 8.6–10.2)
CHLORIDE SERPL-SCNC: 102 MMOL/L — SIGNIFICANT CHANGE UP (ref 98–107)
CO2 SERPL-SCNC: 23 MMOL/L — SIGNIFICANT CHANGE UP (ref 22–29)
CREAT SERPL-MCNC: 1.04 MG/DL — SIGNIFICANT CHANGE UP (ref 0.5–1.3)
EOSINOPHIL # BLD AUTO: 0.23 K/UL — SIGNIFICANT CHANGE UP (ref 0–0.5)
EOSINOPHIL NFR BLD AUTO: 3.3 % — SIGNIFICANT CHANGE UP (ref 0–6)
GLUCOSE BLDC GLUCOMTR-MCNC: 172 MG/DL — HIGH (ref 70–99)
GLUCOSE BLDC GLUCOMTR-MCNC: 185 MG/DL — HIGH (ref 70–99)
GLUCOSE BLDC GLUCOMTR-MCNC: 203 MG/DL — HIGH (ref 70–99)
GLUCOSE BLDC GLUCOMTR-MCNC: 207 MG/DL — HIGH (ref 70–99)
GLUCOSE BLDC GLUCOMTR-MCNC: 209 MG/DL — HIGH (ref 70–99)
GLUCOSE SERPL-MCNC: 197 MG/DL — HIGH (ref 70–115)
HCT VFR BLD CALC: 40.6 % — SIGNIFICANT CHANGE UP (ref 39–50)
HGB BLD-MCNC: 12.7 G/DL — LOW (ref 13–17)
IMM GRANULOCYTES NFR BLD AUTO: 0.3 % — SIGNIFICANT CHANGE UP (ref 0–1.5)
LYMPHOCYTES # BLD AUTO: 3.06 K/UL — SIGNIFICANT CHANGE UP (ref 1–3.3)
LYMPHOCYTES # BLD AUTO: 44 % — SIGNIFICANT CHANGE UP (ref 13–44)
MCHC RBC-ENTMCNC: 25.9 PG — LOW (ref 27–34)
MCHC RBC-ENTMCNC: 31.3 GM/DL — LOW (ref 32–36)
MCV RBC AUTO: 82.7 FL — SIGNIFICANT CHANGE UP (ref 80–100)
MONOCYTES # BLD AUTO: 0.59 K/UL — SIGNIFICANT CHANGE UP (ref 0–0.9)
MONOCYTES NFR BLD AUTO: 8.5 % — SIGNIFICANT CHANGE UP (ref 2–14)
MRSA PCR RESULT.: SIGNIFICANT CHANGE UP
NEUTROPHILS # BLD AUTO: 3.03 K/UL — SIGNIFICANT CHANGE UP (ref 1.8–7.4)
NEUTROPHILS NFR BLD AUTO: 43.5 % — SIGNIFICANT CHANGE UP (ref 43–77)
PLATELET # BLD AUTO: 147 K/UL — LOW (ref 150–400)
POTASSIUM SERPL-MCNC: 4 MMOL/L — SIGNIFICANT CHANGE UP (ref 3.5–5.3)
POTASSIUM SERPL-SCNC: 4 MMOL/L — SIGNIFICANT CHANGE UP (ref 3.5–5.3)
PROT SERPL-MCNC: 7 G/DL — SIGNIFICANT CHANGE UP (ref 6.6–8.7)
RBC # BLD: 4.91 M/UL — SIGNIFICANT CHANGE UP (ref 4.2–5.8)
RBC # FLD: 13.9 % — SIGNIFICANT CHANGE UP (ref 10.3–14.5)
S AUREUS DNA NOSE QL NAA+PROBE: SIGNIFICANT CHANGE UP
SODIUM SERPL-SCNC: 138 MMOL/L — SIGNIFICANT CHANGE UP (ref 135–145)
WBC # BLD: 6.96 K/UL — SIGNIFICANT CHANGE UP (ref 3.8–10.5)
WBC # FLD AUTO: 6.96 K/UL — SIGNIFICANT CHANGE UP (ref 3.8–10.5)

## 2019-11-30 PROCEDURE — 99232 SBSQ HOSP IP/OBS MODERATE 35: CPT

## 2019-11-30 PROCEDURE — 99222 1ST HOSP IP/OBS MODERATE 55: CPT

## 2019-11-30 RX ADMIN — Medication 50 MILLIGRAM(S): at 17:20

## 2019-11-30 RX ADMIN — PANTOPRAZOLE SODIUM 40 MILLIGRAM(S): 20 TABLET, DELAYED RELEASE ORAL at 06:32

## 2019-11-30 RX ADMIN — HEPARIN SODIUM 1000 UNIT(S)/HR: 5000 INJECTION INTRAVENOUS; SUBCUTANEOUS at 03:50

## 2019-11-30 RX ADMIN — SODIUM CHLORIDE 3 MILLILITER(S): 9 INJECTION INTRAMUSCULAR; INTRAVENOUS; SUBCUTANEOUS at 13:32

## 2019-11-30 RX ADMIN — SODIUM CHLORIDE 3 MILLILITER(S): 9 INJECTION INTRAMUSCULAR; INTRAVENOUS; SUBCUTANEOUS at 22:53

## 2019-11-30 RX ADMIN — Medication 3 UNIT(S): at 17:20

## 2019-11-30 RX ADMIN — INSULIN GLARGINE 10 UNIT(S): 100 INJECTION, SOLUTION SUBCUTANEOUS at 22:55

## 2019-11-30 RX ADMIN — Medication 3 UNIT(S): at 12:27

## 2019-11-30 RX ADMIN — HEPARIN SODIUM 1000 UNIT(S)/HR: 5000 INJECTION INTRAVENOUS; SUBCUTANEOUS at 10:52

## 2019-11-30 RX ADMIN — Medication 4: at 12:27

## 2019-11-30 RX ADMIN — SODIUM CHLORIDE 3 MILLILITER(S): 9 INJECTION INTRAMUSCULAR; INTRAVENOUS; SUBCUTANEOUS at 06:29

## 2019-11-30 RX ADMIN — Medication 4: at 17:20

## 2019-11-30 RX ADMIN — Medication 3 UNIT(S): at 08:25

## 2019-11-30 RX ADMIN — Medication 2: at 22:55

## 2019-11-30 RX ADMIN — SIMVASTATIN 20 MILLIGRAM(S): 20 TABLET, FILM COATED ORAL at 22:55

## 2019-11-30 RX ADMIN — Medication 4: at 08:25

## 2019-11-30 RX ADMIN — Medication 81 MILLIGRAM(S): at 08:25

## 2019-11-30 NOTE — PROGRESS NOTE ADULT - SUBJECTIVE AND OBJECTIVE BOX
Subjective: "I'm ok" Denies pain or sob. OOB to chair    VITAL SIGNS  Vital Signs Last 24 Hrs  T(C): 36.5 (11-30-19 @ 09:38), Max: 36.8 (11-29-19 @ 16:46)  T(F): 97.7 (11-30-19 @ 09:38), Max: 98.2 (11-29-19 @ 16:46)  HR: 71 (11-30-19 @ 09:38) (59 - 79)  BP: 149/81 (11-30-19 @ 09:38) (100/58 - 149/81)  RR: 18 (11-30-19 @ 09:38) (16 - 18)  SpO2: 98% (11-30-19 @ 09:38) (97% - 98%)  on RA              Telemetry/Alarms:  SB/SR  LVEF: nl    MEDICATIONS  acetaminophen   Tablet .. 650 milliGRAM(s) Oral every 6 hours PRN  aspirin enteric coated 81 milliGRAM(s) Oral daily  dextrose 40% Gel 15 Gram(s) Oral once PRN  dextrose 5%. 1000 milliLiter(s) IV Continuous <Continuous>  dextrose 50% Injectable 12.5 Gram(s) IV Push once  dextrose 50% Injectable 25 Gram(s) IV Push once  dextrose 50% Injectable 25 Gram(s) IV Push once  glucagon  Injectable 1 milliGRAM(s) IntraMuscular once PRN  heparin  Infusion.  Unit(s)/Hr IV Continuous <Continuous>  insulin glargine Injectable (LANTUS) 10 Unit(s) SubCutaneous at bedtime  insulin lispro (HumaLOG) corrective regimen sliding scale   SubCutaneous four times a day before meals  insulin lispro Injectable (HumaLOG) 3 Unit(s) SubCutaneous three times a day with meals  metoprolol tartrate 50 milliGRAM(s) Oral two times a day  pantoprazole    Tablet 40 milliGRAM(s) Oral before breakfast  simvastatin 20 milliGRAM(s) Oral at bedtime  sodium chloride 0.9% lock flush 3 milliLiter(s) IV Push every 8 hours      PHYSICAL EXAM  General: well nourished, well developed, no acute distress  Neurology: alert and oriented x 3, nonfocal, no gross deficits  Respiratory: clear to auscultation bilaterally  CV: regular rate and rhythm, normal S1, S2, mild systolic murmur  Abdomen: soft, nontender, nondistended, positive bowel sounds  Extremities: warm, well perfused. no edema. + DP pulses      11-29 @ 07:01  -  11-30 @ 07:00  --------------------------------------------------------  IN: 340 mL / OUT: 400 mL / NET: -60 mL    11-30 @ 07:01  -  11-30 @ 14:17  --------------------------------------------------------  IN: 310 mL / OUT: 300 mL / NET: 10 mL        Weights:  Daily Height in cm: 175.26 (29 Nov 2019 17:18)    Daily   Admit Wt: Drug Dosing Weight  Height (cm): 175.3 (29 Nov 2019 17:18)  Weight (kg): 96.2 (29 Nov 2019 17:18)  BMI (kg/m2): 31.3 (29 Nov 2019 17:18)  BSA (m2): 2.12 (29 Nov 2019 17:18)    LABS  11-30    138  |  102  |  18.0  ----------------------------<  197<H>  4.0   |  23.0  |  1.04    Ca    8.9      30 Nov 2019 03:13    TPro  7.0  /  Alb  3.8  /  TBili  0.4  /  DBili  x   /  AST  29  /  ALT  25  /  AlkPhos  84  11-30                                 12.7   6.96  )-----------( 147      ( 30 Nov 2019 03:13 )             40.6          PT/INR - ( 29 Nov 2019 19:42 )   PT: 11.5 sec;   INR: 1.00 ratio         PTT - ( 30 Nov 2019 10:29 )  PTT:57.6 sec  Bilirubin Total, Serum: 0.4 mg/dL (11-30-19 @ 03:13)  Bilirubin Total, Serum: 0.4 mg/dL (11-29-19 @ 19:42)      Hemoglobin A1C, Whole Blood: 10.3 % (11-28 @ 08:15)    Serum Pro-Brain Natriuretic Peptide: 91 pg/mL (11-29-19 @ 19:42)    Glucoses: CAPILLARY BLOOD GLUCOSE      POCT Blood Glucose.: 203 mg/dL (30 Nov 2019 12:04)  POCT Blood Glucose.: 207 mg/dL (30 Nov 2019 08:07)  POCT Blood Glucose.: 248 mg/dL (29 Nov 2019 21:12)    PFTs: moderate restriction    Last CXR:   < from: Xray Chest 1 View AP/PA. (11.29.19 @ 18:08) >    Findings:  The lungs are clear. The heart and mediastinum are unremarkable.  No   hilar abnormality is seen.  There is no evidence of pleural effusion. The   visualized osseous structures demonstrate degenerative changes in the   spine.    Impression:    Unremarkable exam.    < end of copied text >    Last EKG: < from: 12 Lead ECG (11.27.19 @ 23:49) >    Diagnosis Line Normal sinus rhythm  Nonspecific ST abnormality  Abnormal ECG    < end of copied text >    Carotids: < from: US Duplex Carotid Arteries Complete, Bilateral (11.29.19 @ 20:39) >      INTERPRETATION:  Clinical information: Preop exam, atherosclerotic heart   disease.    Duplex sonography performed, color flow Doppler and spectral Doppler   images of the right and  left carotid systems were obtained.    Right carotid system: No evidence of critical stenosis or elevated peak   systolic velocities. No evidence of occlusion. All measurements less than   125 cm/s. Calcified plaque present in the right carotid bulb. Right   vertebral artery shows antegrade flow.      Left carotid system: No evidence of critical stenosis or elevated peak   systolic velocities. No evidence of occlusion. All measurements less than   125 cm/s. Calcifiedplaque present in the left carotid bulb. Left   vertebral artery shows antegrade flow.        IMPRESSION: No evidence of critical stenosis or occlusion, right or left   carotid system.      < end of copied text >      Echo: < from: Transthoracic Echocardiogram (11.27.19 @ 11:03) >     Summary     Fibrocalcific changes noted to the mitral valve leaflets with preserved   leaflet excursion.   Trace mitral regurgitation is present.   Fibrocalcific changes noted to the Aortic valve leaflets with preserved   leaflet excursion.   Mild (1+) aortic regurgitation is present.   The left ventricle is normal in size, wall thickness, wall motion and   contractility.   Estimated left ventricular ejection fraction is 65-70 %.      < end of copied text >      Cath: < from: Cardiac Cath Lab - Adult (11.28.19 @ 11:33) >   Cardiac Arteries and Lesion Findings    LMCA: Minor irregularities.    LAD: Large caliber vessel.     Prox LAD: 95% stenosis.The lesion was discrete.The lesion showed tandem   arrangment.     Comments:in stent stenosis.     Dist LAD: 80% stenosis.The lesion was tubular.     Comments:Distal vessel small sized, no disease.     1st Diag: Ostial.70% stenosis.The lesion was discrete.     Comments:Jailed side branch. Distal vessel small sized, minor   irregularities.     2nd Diag:     Comments:Moderate sized, no disease.    LCx: Large caliber vessel.     1st Ob Ya: Ostial.40% stenosis.The lesion was discrete.     Comments:Distal vessel small sized, minor irregularities.     2nd Ob Ya: Proximal subsection.95% stenosis.The lesion was discrete.The   lesion showed tandem arrangment.     Comments:Distal vessel moderate sized, minor irregularities.    RCA:     Prox RCA: 95% stenosis.The lesion was discrete and eccentric.     Comments:In stent stenosis. Distal vessel large sized, minor   irregularities.    VA  LV function assessed as:Normal.  Ejection Fraction    < end of copied text >      PAST MEDICAL & SURGICAL HISTORY:  Osteoporosis  DJD (degenerative joint disease)  CAD (coronary artery disease)  HTN (hypertension)  GERD (gastroesophageal reflux disease)  Hyperlipidemia  Hepatitis B  Heartburn  Diabetes 1.5, managed as type 2  Arteriosclerosis  Status post cataract extraction: left eye done on 10/2/2019  History of cardiac catheterization: with 4 stents 2003,2006,2012

## 2019-11-30 NOTE — PROGRESS NOTE ADULT - PROBLEM SELECTOR PLAN 1
Transferred from  with triple vessel CAD.  Admit to Dr. Pires.  Plan for OR on Monday for CABG.  Preop workup in progress.  Heparin gtt for unstable angina.  DASH/TLC diet

## 2019-11-30 NOTE — CONSULT NOTE ADULT - SUBJECTIVE AND OBJECTIVE BOX
HPI:  68 y/o M PMHx CAD s/p stents x4 (last cath ), GERD, HLD, DM.  Initially presented to [facility] ED with complaint of chest pain radiating down his right arm since Monday.   States pain usually lasts for about 15 minutes and subsides on its own. Patient denies any aggravating factors. Patient admits to SOB on exertion for the past few weeks. Denies any abd pain, N/V/D/C. In ED labs significant for elevation in troponin- 0.155-->0.305-->0.384. EKG showing NSR @ 79bpm. He underwent cardiac cath at [facility] and was found to have triple vessel CAD. He was transferred to [facility] for CABG.   He had dm2 for 15 yr, terated with MF, tradjenta , trulicity. Recently tradjenta was stopped and he was supposed to start new medication,. Does not recall the name. Checks very rarely bgs.     PAST MEDICAL & SURGICAL HISTORY:  Osteoporosis  DJD (degenerative joint disease)  CAD (coronary artery disease)  HTN (hypertension)  GERD (gastroesophageal reflux disease)  Hyperlipidemia  Hepatitis B  Heartburn  Diabetes 1.5, managed as type 2  Arteriosclerosis  Status post cataract extraction: left eye done on 10/2/2019  History of cardiac catheterization: with 4 stents ,,    FAMILY HISTORY:  FH: myocardial infarction    SOCIAL HISTORY:no tobacco, no etoyh, no drugs, , 4 children, works part time.     REVIEW OF SYSTEMS:  Constitutional: No fever, no chills  Eyes: No eye swelling,no  blurry vision, no redness, no loss of vision.  Neck: No neck pain, no change in voice.  Lungs: No shortness of breath, no wheezing, no cough  CV: No chest pain, no palpitations, no pain with walking.  GI: No nausea, no vomiting, no constipation, no diarrhea, no abdominal pain  : No urinary frequency, no blood in urine, no urinary burning, no difficulty voiding.  Musculoskeletal: No muscle pain, no joint pain  Skin: No rash, no infections  Neurologic: No headaches, no weakness, no burning or pain in feet, no tremor.  Endocrine: No heat intolerance, no cold intolerance  Psych: No depression, no anxiety    MEDICATIONS  (STANDING):  aspirin enteric coated 81 milliGRAM(s) Oral daily  dextrose 5%. 1000 milliLiter(s) (50 mL/Hr) IV Continuous <Continuous>  dextrose 50% Injectable 12.5 Gram(s) IV Push once  dextrose 50% Injectable 25 Gram(s) IV Push once  dextrose 50% Injectable 25 Gram(s) IV Push once  heparin  Infusion.  Unit(s)/Hr (10 mL/Hr) IV Continuous <Continuous>  insulin glargine Injectable (LANTUS) 10 Unit(s) SubCutaneous at bedtime  insulin lispro (HumaLOG) corrective regimen sliding scale   SubCutaneous four times a day before meals  insulin lispro Injectable (HumaLOG) 3 Unit(s) SubCutaneous three times a day with meals  metoprolol tartrate 50 milliGRAM(s) Oral two times a day  pantoprazole    Tablet 40 milliGRAM(s) Oral before breakfast  simvastatin 20 milliGRAM(s) Oral at bedtime  sodium chloride 0.9% lock flush 3 milliLiter(s) IV Push every 8 hours    MEDICATIONS  (PRN):  acetaminophen   Tablet .. 650 milliGRAM(s) Oral every 6 hours PRN Mild Pain (1 - 3), Moderate Pain (4 - 6)  dextrose 40% Gel 15 Gram(s) Oral once PRN Blood Glucose LESS THAN 70 milliGRAM(s)/deciliter  glucagon  Injectable 1 milliGRAM(s) IntraMuscular once PRN Glucose LESS THAN 70 milligrams/deciliter    Allergies  No Known Allergies    CAPILLARY BLOOD GLUCOSE  POCT Blood Glucose.: 203 mg/dL (2019 12:04)      PHYSICAL EXAM:    Vital Signs Last 24 Hrs  T(C): 36.5 (2019 09:38), Max: 36.8 (2019 16:46)  T(F): 97.7 (2019 09:38), Max: 98.2 (2019 16:46)  HR: 71 (2019 09:38) (59 - 79)  BP: 149/81 (2019 09:38) (100/58 - 149/81)  BP(mean): --  RR: 18 (2019 09:38) (16 - 18)  SpO2: 98% (2019 09:38) (97% - 98%)    General appearance: Well developed, well nourished.  Eyes: Pupils equal and reactive to light. EOM full. No exophthalmos.  Neck: Trachea midline. No thyroid enlargement.  Lungs: Normal respiratory excursion. Lungs clear.  CV: Regular cardiac rhythm. No murmur. Carotid and pedal pulses intact.  Abdomen: Soft, non tender, no organomegaly or mass.  Musculoskeletal: No cyanosis, clubbing, or edema. No pedal lesions.  Skin: Warm and moist. No rash. No acanthosis.  Neuro: Cranial nerves intact. Normal motor and sensory function. DTR's normal.  Psych: Normal affect, good judgement.      LABS:                        12.7   6.96  )-----------( 147      ( 2019 03:13 )             40.6         138  |  102  |  18.0  ----------------------------<  197<H>  4.0   |  23.0  |  1.04    Ca    8.9      2019 03:13    TPro  7.0  /  Alb  3.8  /  TBili  0.4  /  DBili  x   /  AST  29  /  ALT  25  /  AlkPhos  84      Urinalysis Basic - ( 2019 23:17 )    Color: Yellow / Appearance: Clear / S.010 / pH: x  Gluc: x / Ketone: Negative  / Bili: Negative / Urobili: Negative mg/dL   Blood: x / Protein: Negative mg/dL / Nitrite: Negative   Leuk Esterase: Negative / RBC: x / WBC x   Sq Epi: x / Non Sq Epi: x / Bacteria: x      LIVER FUNCTIONS - ( 2019 03:13 )  Alb: 3.8 g/dL / Pro: 7.0 g/dL / ALK PHOS: 84 U/L / ALT: 25 U/L / AST: 29 U/L / GGT: x           Hemoglobin A1C, Whole Blood: 10.3 % ( @ 08:15)

## 2019-11-30 NOTE — PROGRESS NOTE ADULT - ASSESSMENT
70 y/o M PMHx CAD s/p stents x4 (last cath 2013), GERD, HLD, DM.  Initially presented to [facility] ED with complaint of chest pain radiating down his right arm since Monday.   States pain usually lasts for about 15 minutes and subsides on its own. Patient denies any aggravating factors. Patient states pain is located in the middle of his chest and sometimes will radiate to R arm.  Patient denies any SOB, but does admit to SOB on exertion for the past few weeks. Denies any abd pain, N/V/D/C.    In ED, patient received ASA 325mg x1, CXR done negative for acute cardiopulmonary process. Patient labs significant for elevation in troponin- 0.155-->0.305-->0.384. EKG showing NSR @ 79bpm     He underwent cardiac cath at [facility] and was found to have triple vessel CAD.  Per the patient, he was given Plavix at [place] as well at some point.  He was transferred to [facility] for surgical evaluation.

## 2019-11-30 NOTE — CONSULT NOTE ADULT - ASSESSMENT
70 y/o M PMHx CAD s/p stents x4 (last cath 2013), GERD, HLD, DM.  Initially presented to [facility] ED with complaint of chest pain radiating down his right arm since Monday. He had cardiac cath at [facility] and was found to have triple vessel CAD. He will have CABG on Monday. POorly controlled dm2.   Type 2 diabetes mellitus without complication, with long-term current use of insulin.  Plan: A1C 10.3.  LOLA AC/HS   Premeal humalog 3 units.    increase lantus to 12 u HS   increase meal insulin to 5 u TID w meals.   continue SSI for extracoverage   he will need insulin gtt perioperatively     Coronary artery disease of native artery of native heart with stable angina pectoris.  Plan: Transferred from  with triple vessel CAD.  Plan for OR on Monday for CABG.  Heparin gtt.    Essential hypertension.  Plan: Continue beta blocker.       Hyperlipidemia.  Plan: Continue Statin.

## 2019-12-01 LAB
ABO RH CONFIRMATION: SIGNIFICANT CHANGE UP
ANION GAP SERPL CALC-SCNC: 13 MMOL/L — SIGNIFICANT CHANGE UP (ref 5–17)
APTT BLD: 28.8 SEC — SIGNIFICANT CHANGE UP (ref 27.5–36.3)
APTT BLD: 58.3 SEC — HIGH (ref 27.5–36.3)
APTT BLD: 63.2 SEC — HIGH (ref 27.5–36.3)
BUN SERPL-MCNC: 19 MG/DL — SIGNIFICANT CHANGE UP (ref 8–20)
CALCIUM SERPL-MCNC: 8.7 MG/DL — SIGNIFICANT CHANGE UP (ref 8.6–10.2)
CHLORIDE SERPL-SCNC: 101 MMOL/L — SIGNIFICANT CHANGE UP (ref 98–107)
CK SERPL-CCNC: 96 U/L — SIGNIFICANT CHANGE UP (ref 30–200)
CO2 SERPL-SCNC: 22 MMOL/L — SIGNIFICANT CHANGE UP (ref 22–29)
CREAT SERPL-MCNC: 0.95 MG/DL — SIGNIFICANT CHANGE UP (ref 0.5–1.3)
GLUCOSE BLDC GLUCOMTR-MCNC: 170 MG/DL — HIGH (ref 70–99)
GLUCOSE BLDC GLUCOMTR-MCNC: 197 MG/DL — HIGH (ref 70–99)
GLUCOSE BLDC GLUCOMTR-MCNC: 226 MG/DL — HIGH (ref 70–99)
GLUCOSE BLDC GLUCOMTR-MCNC: 252 MG/DL — HIGH (ref 70–99)
GLUCOSE SERPL-MCNC: 230 MG/DL — HIGH (ref 70–115)
HCT VFR BLD CALC: 40.3 % — SIGNIFICANT CHANGE UP (ref 39–50)
HGB BLD-MCNC: 12.4 G/DL — LOW (ref 13–17)
MAGNESIUM SERPL-MCNC: 2 MG/DL — SIGNIFICANT CHANGE UP (ref 1.6–2.6)
MCHC RBC-ENTMCNC: 25.4 PG — LOW (ref 27–34)
MCHC RBC-ENTMCNC: 30.8 GM/DL — LOW (ref 32–36)
MCV RBC AUTO: 82.4 FL — SIGNIFICANT CHANGE UP (ref 80–100)
PLATELET # BLD AUTO: 139 K/UL — LOW (ref 150–400)
POTASSIUM SERPL-MCNC: 4.2 MMOL/L — SIGNIFICANT CHANGE UP (ref 3.5–5.3)
POTASSIUM SERPL-SCNC: 4.2 MMOL/L — SIGNIFICANT CHANGE UP (ref 3.5–5.3)
RBC # BLD: 4.89 M/UL — SIGNIFICANT CHANGE UP (ref 4.2–5.8)
RBC # FLD: 13.9 % — SIGNIFICANT CHANGE UP (ref 10.3–14.5)
SODIUM SERPL-SCNC: 136 MMOL/L — SIGNIFICANT CHANGE UP (ref 135–145)
TROPONIN T SERPL-MCNC: 0.03 NG/ML — SIGNIFICANT CHANGE UP (ref 0–0.06)
WBC # BLD: 6.28 K/UL — SIGNIFICANT CHANGE UP (ref 3.8–10.5)
WBC # FLD AUTO: 6.28 K/UL — SIGNIFICANT CHANGE UP (ref 3.8–10.5)

## 2019-12-01 PROCEDURE — 99232 SBSQ HOSP IP/OBS MODERATE 35: CPT

## 2019-12-01 RX ORDER — CEFUROXIME AXETIL 250 MG
1500 TABLET ORAL ONCE
Refills: 0 | Status: DISCONTINUED | OUTPATIENT
Start: 2019-12-02 | End: 2019-12-05

## 2019-12-01 RX ORDER — CHLORHEXIDINE GLUCONATE 213 G/1000ML
15 SOLUTION TOPICAL
Refills: 0 | Status: DISCONTINUED | OUTPATIENT
Start: 2019-12-01 | End: 2019-12-03

## 2019-12-01 RX ORDER — CHLORHEXIDINE GLUCONATE 213 G/1000ML
1 SOLUTION TOPICAL
Refills: 0 | Status: DISCONTINUED | OUTPATIENT
Start: 2019-12-01 | End: 2019-12-03

## 2019-12-01 RX ORDER — INSULIN LISPRO 100/ML
5 VIAL (ML) SUBCUTANEOUS
Refills: 0 | Status: DISCONTINUED | OUTPATIENT
Start: 2019-12-01 | End: 2019-12-02

## 2019-12-01 RX ADMIN — CHLORHEXIDINE GLUCONATE 15 MILLILITER(S): 213 SOLUTION TOPICAL at 17:08

## 2019-12-01 RX ADMIN — SODIUM CHLORIDE 3 MILLILITER(S): 9 INJECTION INTRAMUSCULAR; INTRAVENOUS; SUBCUTANEOUS at 21:45

## 2019-12-01 RX ADMIN — Medication 650 MILLIGRAM(S): at 03:40

## 2019-12-01 RX ADMIN — Medication 5 UNIT(S): at 12:01

## 2019-12-01 RX ADMIN — Medication 5 UNIT(S): at 17:08

## 2019-12-01 RX ADMIN — INSULIN GLARGINE 10 UNIT(S): 100 INJECTION, SOLUTION SUBCUTANEOUS at 22:10

## 2019-12-01 RX ADMIN — Medication 4: at 12:01

## 2019-12-01 RX ADMIN — CHLORHEXIDINE GLUCONATE 1 APPLICATION(S): 213 SOLUTION TOPICAL at 17:06

## 2019-12-01 RX ADMIN — HEPARIN SODIUM 1000 UNIT(S)/HR: 5000 INJECTION INTRAVENOUS; SUBCUTANEOUS at 20:25

## 2019-12-01 RX ADMIN — Medication 650 MILLIGRAM(S): at 04:08

## 2019-12-01 RX ADMIN — Medication 6: at 22:10

## 2019-12-01 RX ADMIN — SODIUM CHLORIDE 3 MILLILITER(S): 9 INJECTION INTRAMUSCULAR; INTRAVENOUS; SUBCUTANEOUS at 12:03

## 2019-12-01 RX ADMIN — Medication 50 MILLIGRAM(S): at 06:24

## 2019-12-01 RX ADMIN — SODIUM CHLORIDE 3 MILLILITER(S): 9 INJECTION INTRAMUSCULAR; INTRAVENOUS; SUBCUTANEOUS at 06:25

## 2019-12-01 RX ADMIN — Medication 2: at 17:09

## 2019-12-01 RX ADMIN — SIMVASTATIN 20 MILLIGRAM(S): 20 TABLET, FILM COATED ORAL at 22:10

## 2019-12-01 RX ADMIN — Medication 50 MILLIGRAM(S): at 17:09

## 2019-12-01 RX ADMIN — CHLORHEXIDINE GLUCONATE 1 APPLICATION(S): 213 SOLUTION TOPICAL at 12:00

## 2019-12-01 RX ADMIN — Medication 3 UNIT(S): at 08:29

## 2019-12-01 RX ADMIN — Medication 2: at 08:29

## 2019-12-01 RX ADMIN — PANTOPRAZOLE SODIUM 40 MILLIGRAM(S): 20 TABLET, DELAYED RELEASE ORAL at 06:24

## 2019-12-01 RX ADMIN — Medication 81 MILLIGRAM(S): at 12:01

## 2019-12-01 NOTE — ADVANCED PRACTICE NURSE CONSULT - ASSESSMENT
went to see pt in am pt is a+ox3 c/o 0 pain he states he has diabetes for many years and fu w dr singh, who put him on 4 antidiabeteic agent, pt was educated about the fact that w all this 4 agents he is still not on target and that after surgery we need a euglycemic control to prevent port syrgical complications and therefor will need insulin, pt verbalized understanding.

## 2019-12-01 NOTE — PROGRESS NOTE ADULT - ASSESSMENT
68 y/o M PMHx CAD s/p stents x4 (last cath 2013), GERD, HLD, DM.  Initially presented to [facility] ED with complaint of chest pain radiating down his right arm since Monday.   States pain usually lasts for about 15 minutes and subsides on its own. Patient denies any aggravating factors. Patient states pain is located in the middle of his chest and sometimes will radiate to R arm.  Patient denies any SOB, but does admit to SOB on exertion for the past few weeks. Denies any abd pain, N/V/D/C.    In ED, patient received ASA 325mg x1, CXR done negative for acute cardiopulmonary process. Patient labs significant for elevation in troponin- 0.155-->0.305-->0.384. EKG showing NSR @ 79bpm     He underwent cardiac cath at [facility] and was found to have triple vessel CAD.  Per the patient, he was given Plavix at [place] as well at some point.  He was transferred to [facility] for surgical evaluation and placed on a heparin gtt for unstable angina. Preop for CABG Monday 12/2.

## 2019-12-01 NOTE — PROGRESS NOTE ADULT - PROBLEM SELECTOR PLAN 4
A1C 10.3.  LOLA AC/HS   Premeal humalog 5 units.  (increased)  Lantus 10 units. (maintained as patient will be NPO tonight)  Endocrine consult appreciated

## 2019-12-01 NOTE — CHART NOTE - NSCHARTNOTEFT_GEN_A_CORE
Called by RN this am for patient with complaint of chest pain.     Patient seen and evaluated at bedside. States he was having chest pain rated 4/10, radiating into b/l arms, started around 3:15am. States he has been having chest pain on and off, which is the reason he brought himself to the hospital. Notified RN around 3:30am of chest discomfort. Pateint was given standing order of Tylenol and pain has since subsided as per patient. Denies any fevers, chills, lightheadedness, dizziness, SOB, cough, jaw pain, back pain, abdominal pain, NVD, LE edema, or any other acute complaints at this time.     Constitutional: NAD, well developed, well nourished lying in bed  Neuro: A+O x 3, non-focal, speech clear and intact  HEENT: NC/AT, anicteric sclerae, oral mucosa pink and moist  Neck: supple  CV: RRR, +S1S2, soft systolic murmurs, no rub  Pulm/chest: lung sounds CTA and equal bilaterally, no accessory muscle use noted  Ext: MACK x 4, no C/C/E  Skin: warm, well perfused  Psych: calm, appropriate affect    Assessment: Stat EKG ordered and unchanged from previous study. Last aPTT on Hep gtt was 57. Continue Hep gtt. F/u am labs. Cardiac enzymes added to am labs. Patient is chest pain free currently. Has taken nitro in the past for chest pain, but patient declining nitro currently. RN aware of plan. Will continue to monitor.

## 2019-12-01 NOTE — PROGRESS NOTE ADULT - SUBJECTIVE AND OBJECTIVE BOX
Cardiac Surgery Pre-op Note:    CC: Patient is a 69y old  Male who presents with a chief complaint of transfer from  with CAD (2019 14:19)      Referring Physician: Brady                                                                                                           Surgeon: Pranay    Procedure:  CABG    Allergies    No Known Allergies    Intolerances        HPI:  70 y/o M PMHx CAD s/p stents x4 (last cath ), GERD, HLD, DM.  Initially presented to [facility] ED with complaint of chest pain radiating down his right arm since Monday.   States pain usually lasts for about 15 minutes and subsides on its own. Patient denies any aggravating factors. Patient states pain is located in the middle of his chest and sometimes will radiate to R arm.  Patient denies any SOB, but does admit to SOB on exertion for the past few weeks. Denies any abd pain, N/V/D/C.    In ED, patient received ASA 325mg x1, CXR done negative for acute cardiopulmonary process. Patient labs significant for elevation in troponin- 0.155-->0.305-->0.384. EKG showing NSR @ 79bpm     He underwent cardiac cath at [facility] and was found to have triple vessel CAD.  Per the patient, he was given Plavix at [place] as well at some point.  He was transferred to [facility] for surgical evaluation.    Currently sitting up in bed.  Denies CP or SOB.  NAD noted.  Family at bedside.  VSS. (2019 17:42)      Subjective Assessment: "I had some pain last night, they gave me tylenol. I was sweating... but I am better now" Denies sob, palpitations, nausea.     PAST MEDICAL & SURGICAL HISTORY:  Osteoporosis  DJD (degenerative joint disease)  CAD (coronary artery disease)  HTN (hypertension)  GERD (gastroesophageal reflux disease)  Hyperlipidemia  Hepatitis B  Heartburn  Diabetes 1.5, managed as type 2  Arteriosclerosis  Status post cataract extraction: left eye done on 10/2/2019  History of cardiac catheterization: with 4 stents ,,      MEDICATIONS  (STANDING):  aspirin enteric coated 81 milliGRAM(s) Oral daily  dextrose 5%. 1000 milliLiter(s) (50 mL/Hr) IV Continuous <Continuous>  dextrose 50% Injectable 12.5 Gram(s) IV Push once  dextrose 50% Injectable 25 Gram(s) IV Push once  dextrose 50% Injectable 25 Gram(s) IV Push once  heparin  Infusion.  Unit(s)/Hr (10 mL/Hr) IV Continuous <Continuous>  insulin glargine Injectable (LANTUS) 10 Unit(s) SubCutaneous at bedtime  insulin lispro (HumaLOG) corrective regimen sliding scale   SubCutaneous four times a day before meals  insulin lispro Injectable (HumaLOG) 3 Unit(s) SubCutaneous three times a day with meals  metoprolol tartrate 50 milliGRAM(s) Oral two times a day  pantoprazole    Tablet 40 milliGRAM(s) Oral before breakfast  simvastatin 20 milliGRAM(s) Oral at bedtime  sodium chloride 0.9% lock flush 3 milliLiter(s) IV Push every 8 hours    MEDICATIONS  (PRN):  acetaminophen   Tablet .. 650 milliGRAM(s) Oral every 6 hours PRN Mild Pain (1 - 3), Moderate Pain (4 - 6)  dextrose 40% Gel 15 Gram(s) Oral once PRN Blood Glucose LESS THAN 70 milliGRAM(s)/deciliter  glucagon  Injectable 1 milliGRAM(s) IntraMuscular once PRN Glucose LESS THAN 70 milligrams/deciliter        Labs:                        12.4   6.28  )-----------( 139      ( 01 Dec 2019 05:51 )             40.3         136  |  101  |  19.0  ----------------------------<  230<H>  4.2   |  22.0  |  0.95    Ca    8.7      01 Dec 2019 05:50  Mg     2.0         TPro  7.0  /  Alb  3.8  /  TBili  0.4  /  DBili  x   /  AST  29  /  ALT  25  /  AlkPhos  84      PT/INR - ( 2019 19:42 )   PT: 11.5 sec;   INR: 1.00 ratio         PTT - ( 01 Dec 2019 05:51 )  PTT:28.8 sec    Blood Type: B POS  HGB A1C: Hemoglobin A1C, Whole Blood: 10.3 % ( @ 08:15)    Prealbumin:   Pro-BNP: Serum Pro-Brain Natriuretic Peptide: 91 pg/mL ( @ 19:42)    Thyroid Panel:  @ 19:42/2.71  --/--/--    MRSA: MRSA PCR Result.: NotDetec ( @ 23:17)   / MSSA: NEG  Urinalysis Basic - ( 2019 23:17 )    Color: Yellow / Appearance: Clear / S.010 / pH: x  Gluc: x / Ketone: Negative  / Bili: Negative / Urobili: Negative mg/dL   Blood: x / Protein: Negative mg/dL / Nitrite: Negative   Leuk Esterase: Negative / RBC: x / WBC x   Sq Epi: x / Non Sq Epi: x / Bacteria: x        CXR: < from: Xray Chest 1 View AP/PA. (19 @ 18:08) >    Findings:  The lungs are clear. The heart and mediastinum are unremarkable.  No   hilar abnormality is seen.  There is no evidence of pleural effusion. The   visualized osseous structures demonstrate degenerative changes in the   spine.    Impression:    Unremarkable exam.    < end of copied text >      EKG: < from: 12 Lead ECG (19 @ 18:08) >    Ventricular Rate 65 BPM    Atrial Rate 65 BPM    P-R Interval 132 ms    QRS Duration 78 ms    Q-T Interval 438 ms    QTC Calculation(Bezet) 455 ms    P Axis 24 degrees    R Axis -11 degrees    T Axis 14 degrees    Diagnosis Line *** Poor data quality, interpretation may be adversely affected  Normal sinus rhythm  Minimal voltage criteria for LVH, may be normal variant  Borderline ECG    Confirmed by SANDI MARKS ([id number]) on 2019 3:06:38 PM    < end of copied text >      Carotid Duplex:  < from:  Duplex Carotid Arteries Complete, Bilateral (19 @ 20:39) >      IMPRESSION: No evidence of critical stenosis or occlusion, right or left   carotid system.      < end of copied text >      PFT's:  moderate restriction, FEV1 2.1    Echocardiogram:< from: TTE Echo Complete w/Doppler (19 @ 19:23) >    Summary:   1. Left ventricular ejection fraction, by visual estimation, is 55 to   60%.   2. Normal global left ventricular systolic function.   3. Spectral Doppler shows impaired relaxation pattern of left   ventricular myocardial filling (Grade I diastolic dysfunction).   4. Mild mitral annular calcification.   5. Thickening of the anterior and posterior mitral valve leaflets.   6. Sclerotic aortic valve with normal opening.    MD Sudarshan Electronically signed on 2019 at 3:33:03 PM    < end of copied text >      Cardiac catheterization: < from: Cardiac Cath Lab - Adult (19 @ 11:33) >   Cardiac Arteries and Lesion Findings    LMCA: Minor irregularities.    LAD: Large caliber vessel.     Prox LAD: 95% stenosis.The lesion was discrete.The lesion showed tandem   arrangment.     Comments:in stent stenosis.     Dist LAD: 80% stenosis.The lesion was tubular.     Comments:Distal vessel small sized, no disease.     1st Diag: Ostial.70% stenosis.The lesion was discrete.     Comments:Jailed side branch. Distal vessel small sized, minor   irregularities.     2nd Diag:     Comments:Moderate sized, no disease.    LCx: Large caliber vessel.     1st Ob Ya: Ostial.40% stenosis.The lesion was discrete.     Comments:Distal vessel small sized, minor irregularities.     2nd Ob Ya: Proximal subsection.95% stenosis.The lesion was discrete.The   lesion showed tandem arrangment.     Comments:Distal vessel moderate sized, minor irregularities.    RCA:     Prox RCA: 95% stenosis.The lesion was discrete and eccentric.     Comments:In stent stenosis. Distal vessel large sized, minor   irregularities.    VA  LV function assessed as:Normal.    < end of copied text >      Vein Mapping: done    Gen: WN/WD NAD  Neuro: AAOx3, nonfocal  Pulm: CTA B/L  CV: RRR, S1S2  Abd: Soft, NT, ND +BS  Ext: No edema, + peripheral pulses    CURRENT INR for patients previously on Coumadin: NA  Heparin off at: 10AM    Pt has AICD/PPM [ ] Yes  [x ] No             Brand Name:  Pre-op Beta Blocker ordered within 24 hrs of surgery (CABG ONLY)?  [ x] Yes  [ ] No  If not, Why?  Type & Cross  [ x] Yes  [ ] No  NPO after Midnight [x ] Yes  [ ] No  Pre-op ABX ordered, to be taped on chart:  [x ] Yes  [ ] No     Hibiclens/Peridex ordered [x ] Yes  [ ] No  Intraop Items Ordered: PRBCs, CXR, MAE [x ]   Consent obtained, or blank in chart  [ x] Yes  [ ] No

## 2019-12-01 NOTE — ADVANCED PRACTICE NURSE CONSULT - RECOMMEDATIONS
continue diabetes self management education  pt to inject all inuslin doses while in the hospital using prefilled insulin  syringe and rn supervision  glucometer teaching

## 2019-12-02 ENCOUNTER — TRANSCRIPTION ENCOUNTER (OUTPATIENT)
Age: 69
End: 2019-12-02

## 2019-12-02 LAB
ANION GAP SERPL CALC-SCNC: 14 MMOL/L — SIGNIFICANT CHANGE UP (ref 5–17)
APTT BLD: 78.3 SEC — HIGH (ref 27.5–36.3)
BUN SERPL-MCNC: 20 MG/DL — SIGNIFICANT CHANGE UP (ref 8–20)
CALCIUM SERPL-MCNC: 9 MG/DL — SIGNIFICANT CHANGE UP (ref 8.6–10.2)
CHLORIDE SERPL-SCNC: 102 MMOL/L — SIGNIFICANT CHANGE UP (ref 98–107)
CO2 SERPL-SCNC: 22 MMOL/L — SIGNIFICANT CHANGE UP (ref 22–29)
CREAT SERPL-MCNC: 0.91 MG/DL — SIGNIFICANT CHANGE UP (ref 0.5–1.3)
GLUCOSE BLDC GLUCOMTR-MCNC: 162 MG/DL — HIGH (ref 70–99)
GLUCOSE BLDC GLUCOMTR-MCNC: 191 MG/DL — HIGH (ref 70–99)
GLUCOSE BLDC GLUCOMTR-MCNC: 206 MG/DL — HIGH (ref 70–99)
GLUCOSE BLDC GLUCOMTR-MCNC: 211 MG/DL — HIGH (ref 70–99)
GLUCOSE SERPL-MCNC: 210 MG/DL — HIGH (ref 70–115)
HCT VFR BLD CALC: 40 % — SIGNIFICANT CHANGE UP (ref 39–50)
HGB BLD-MCNC: 12.5 G/DL — LOW (ref 13–17)
MAGNESIUM SERPL-MCNC: 2 MG/DL — SIGNIFICANT CHANGE UP (ref 1.6–2.6)
MCHC RBC-ENTMCNC: 25.8 PG — LOW (ref 27–34)
MCHC RBC-ENTMCNC: 31.3 GM/DL — LOW (ref 32–36)
MCV RBC AUTO: 82.6 FL — SIGNIFICANT CHANGE UP (ref 80–100)
PLATELET # BLD AUTO: 143 K/UL — LOW (ref 150–400)
POTASSIUM SERPL-MCNC: 3.9 MMOL/L — SIGNIFICANT CHANGE UP (ref 3.5–5.3)
POTASSIUM SERPL-SCNC: 3.9 MMOL/L — SIGNIFICANT CHANGE UP (ref 3.5–5.3)
PREALB SERPL-MCNC: 18 MG/DL — SIGNIFICANT CHANGE UP (ref 18–38)
RBC # BLD: 4.84 M/UL — SIGNIFICANT CHANGE UP (ref 4.2–5.8)
RBC # FLD: 14 % — SIGNIFICANT CHANGE UP (ref 10.3–14.5)
SODIUM SERPL-SCNC: 138 MMOL/L — SIGNIFICANT CHANGE UP (ref 135–145)
WBC # BLD: 6.37 K/UL — SIGNIFICANT CHANGE UP (ref 3.8–10.5)
WBC # FLD AUTO: 6.37 K/UL — SIGNIFICANT CHANGE UP (ref 3.8–10.5)

## 2019-12-02 PROCEDURE — 99232 SBSQ HOSP IP/OBS MODERATE 35: CPT

## 2019-12-02 RX ORDER — INSULIN GLARGINE 100 [IU]/ML
12 INJECTION, SOLUTION SUBCUTANEOUS AT BEDTIME
Refills: 0 | Status: DISCONTINUED | OUTPATIENT
Start: 2019-12-02 | End: 2019-12-03

## 2019-12-02 RX ORDER — INSULIN LISPRO 100/ML
6 VIAL (ML) SUBCUTANEOUS
Refills: 0 | Status: DISCONTINUED | OUTPATIENT
Start: 2019-12-02 | End: 2019-12-03

## 2019-12-02 RX ORDER — METOPROLOL TARTRATE 50 MG
12.5 TABLET ORAL
Refills: 0 | Status: DISCONTINUED | OUTPATIENT
Start: 2019-12-02 | End: 2019-12-03

## 2019-12-02 RX ADMIN — SIMVASTATIN 20 MILLIGRAM(S): 20 TABLET, FILM COATED ORAL at 22:00

## 2019-12-02 RX ADMIN — CHLORHEXIDINE GLUCONATE 1 APPLICATION(S): 213 SOLUTION TOPICAL at 05:03

## 2019-12-02 RX ADMIN — Medication 2: at 22:00

## 2019-12-02 RX ADMIN — Medication 5 UNIT(S): at 08:43

## 2019-12-02 RX ADMIN — SODIUM CHLORIDE 3 MILLILITER(S): 9 INJECTION INTRAMUSCULAR; INTRAVENOUS; SUBCUTANEOUS at 12:44

## 2019-12-02 RX ADMIN — CHLORHEXIDINE GLUCONATE 15 MILLILITER(S): 213 SOLUTION TOPICAL at 22:00

## 2019-12-02 RX ADMIN — Medication 12.5 MILLIGRAM(S): at 17:31

## 2019-12-02 RX ADMIN — Medication 50 MILLIGRAM(S): at 06:46

## 2019-12-02 RX ADMIN — SODIUM CHLORIDE 3 MILLILITER(S): 9 INJECTION INTRAMUSCULAR; INTRAVENOUS; SUBCUTANEOUS at 22:04

## 2019-12-02 RX ADMIN — CHLORHEXIDINE GLUCONATE 1 APPLICATION(S): 213 SOLUTION TOPICAL at 22:01

## 2019-12-02 RX ADMIN — INSULIN GLARGINE 12 UNIT(S): 100 INJECTION, SOLUTION SUBCUTANEOUS at 22:01

## 2019-12-02 RX ADMIN — Medication 6 UNIT(S): at 12:36

## 2019-12-02 RX ADMIN — Medication 2: at 17:16

## 2019-12-02 RX ADMIN — Medication 4: at 12:36

## 2019-12-02 RX ADMIN — Medication 81 MILLIGRAM(S): at 08:55

## 2019-12-02 RX ADMIN — PANTOPRAZOLE SODIUM 40 MILLIGRAM(S): 20 TABLET, DELAYED RELEASE ORAL at 05:01

## 2019-12-02 RX ADMIN — Medication 4: at 08:43

## 2019-12-02 RX ADMIN — SODIUM CHLORIDE 3 MILLILITER(S): 9 INJECTION INTRAMUSCULAR; INTRAVENOUS; SUBCUTANEOUS at 05:00

## 2019-12-02 RX ADMIN — Medication 6 UNIT(S): at 17:16

## 2019-12-02 RX ADMIN — CHLORHEXIDINE GLUCONATE 15 MILLILITER(S): 213 SOLUTION TOPICAL at 05:01

## 2019-12-02 NOTE — PROGRESS NOTE ADULT - ASSESSMENT
DM type 2, insulin treated, awaiting CABG. Glycemic control mildly suboptimal; will raise basal and prandial components slightly

## 2019-12-02 NOTE — PROGRESS NOTE ADULT - SUBJECTIVE AND OBJECTIVE BOX
INTERVAL HPI/OVERNIGHT EVENTS: follow up of diabetes    MEDICATIONS  (STANDING):  aspirin enteric coated 81 milliGRAM(s) Oral daily  cefuroxime  IVPB 1500 milliGRAM(s) IV Intermittent once  chlorhexidine 0.12% Liquid 15 milliLiter(s) Swish and Spit two times a day  chlorhexidine 4% Liquid 1 Application(s) Topical two times a day  dextrose 5%. 1000 milliLiter(s) (50 mL/Hr) IV Continuous <Continuous>  dextrose 50% Injectable 12.5 Gram(s) IV Push once  dextrose 50% Injectable 25 Gram(s) IV Push once  dextrose 50% Injectable 25 Gram(s) IV Push once  heparin  Infusion.  Unit(s)/Hr (10 mL/Hr) IV Continuous <Continuous>  insulin glargine Injectable (LANTUS) 10 Unit(s) SubCutaneous at bedtime  insulin lispro (HumaLOG) corrective regimen sliding scale   SubCutaneous four times a day before meals  insulin lispro Injectable (HumaLOG) 5 Unit(s) SubCutaneous three times a day before meals  metoprolol tartrate 50 milliGRAM(s) Oral two times a day  pantoprazole    Tablet 40 milliGRAM(s) Oral before breakfast  simvastatin 20 milliGRAM(s) Oral at bedtime  sodium chloride 0.9% lock flush 3 milliLiter(s) IV Push every 8 hours    MEDICATIONS  (PRN):  acetaminophen   Tablet .. 650 milliGRAM(s) Oral every 6 hours PRN Mild Pain (1 - 3), Moderate Pain (4 - 6)  dextrose 40% Gel 15 Gram(s) Oral once PRN Blood Glucose LESS THAN 70 milliGRAM(s)/deciliter  glucagon  Injectable 1 milliGRAM(s) IntraMuscular once PRN Glucose LESS THAN 70 milligrams/deciliter      Allergies    No Known Allergies    Intolerances        Review of systems: no complaints, pre-op for tomorrow    Vital Signs Last 24 Hrs  T(C): 36.8 (02 Dec 2019 10:04), Max: 36.8 (02 Dec 2019 10:04)  T(F): 98.2 (02 Dec 2019 10:04), Max: 98.2 (02 Dec 2019 10:04)  HR: 71 (02 Dec 2019 10:04) (60 - 73)  BP: 107/57 (02 Dec 2019 10:04) (98/57 - 145/70)  BP(mean): --  RR: 18 (02 Dec 2019 10:04) (16 - 18)  SpO2: 97% (02 Dec 2019 10:04) (95% - 100%)          LABS:                        12.5   6.37  )-----------( 143      ( 02 Dec 2019 06:12 )             40.0     12-02    138  |  102  |  20.0  ----------------------------<  210<H>  3.9   |  22.0  |  0.91    Ca    9.0      02 Dec 2019 06:12  Mg     2.0     12-02            POCT Blood Glucose.: 206 mg/dL (12-02-19 @ 08:14)  POCT Blood Glucose.: 252 mg/dL (12-01-19 @ 21:51)  POCT Blood Glucose.: 170 mg/dL (12-01-19 @ 17:07)  POCT Blood Glucose.: 226 mg/dL (12-01-19 @ 11:59)  POCT Blood Glucose.: 197 mg/dL (12-01-19 @ 08:19)  POCT Blood Glucose.: 185 mg/dL (11-30-19 @ 22:52)  POCT Blood Glucose.: 172 mg/dL (11-30-19 @ 21:44)  POCT Blood Glucose.: 209 mg/dL (11-30-19 @ 17:08)  POCT Blood Glucose.: 203 mg/dL (11-30-19 @ 12:04)  POCT Blood Glucose.: 207 mg/dL (11-30-19 @ 08:07)  POCT Blood Glucose.: 248 mg/dL (11-29-19 @ 21:12)  POCT Blood Glucose.: 217 mg/dL (11-29-19 @ 12:17)

## 2019-12-02 NOTE — PROGRESS NOTE ADULT - SUBJECTIVE AND OBJECTIVE BOX
Cardiac Surgery Pre-op Note:    CC: Patient is a 69y old  Male who presents with a chief complaint of transfer from  with CAD                                                                                                             Surgeon: Pranay    Procedure: CABG    Allergies    No Known Allergies    Intolerances        HPI:  68 y/o M PMHx CAD s/p stents x4 (last cath 2013), GERD, HLD, DM.  Initially presented to [facility] ED with complaint of chest pain radiating down his right arm since Monday.   States pain usually lasts for about 15 minutes and subsides on its own. Patient denies any aggravating factors. Patient states pain is located in the middle of his chest and sometimes will radiate to R arm.  Patient denies any SOB, but does admit to SOB on exertion for the past few weeks. Denies any abd pain, N/V/D/C.    In ED, patient received ASA 325mg x1, CXR done negative for acute cardiopulmonary process. Patient labs significant for elevation in troponin- 0.155-->0.305-->0.384. EKG showing NSR @ 79bpm     He underwent cardiac cath at [facility] and was found to have triple vessel CAD.  Per the patient, he was given Plavix at [place] as well at some point.  He was transferred to [facility] for surgical evaluation.        PAST MEDICAL & SURGICAL HISTORY:  Osteoporosis  DJD (degenerative joint disease)  CAD (coronary artery disease)  HTN (hypertension)  GERD (gastroesophageal reflux disease)  Hyperlipidemia  Hepatitis B  Heartburn  Diabetes 1.5, managed as type 2  Arteriosclerosis  Status post cataract extraction: left eye done on 10/2/2019  History of cardiac catheterization: with 4 stents 2003,2006,2012      MEDICATIONS  (STANDING):  aspirin enteric coated 81 milliGRAM(s) Oral daily  cefuroxime  IVPB 1500 milliGRAM(s) IV Intermittent once  chlorhexidine 0.12% Liquid 15 milliLiter(s) Swish and Spit two times a day  chlorhexidine 4% Liquid 1 Application(s) Topical two times a day  dextrose 5%. 1000 milliLiter(s) (50 mL/Hr) IV Continuous <Continuous>  dextrose 50% Injectable 12.5 Gram(s) IV Push once  dextrose 50% Injectable 25 Gram(s) IV Push once  dextrose 50% Injectable 25 Gram(s) IV Push once  heparin  Infusion.  Unit(s)/Hr (10 mL/Hr) IV Continuous <Continuous>  insulin glargine Injectable (LANTUS) 12 Unit(s) SubCutaneous at bedtime  insulin lispro (HumaLOG) corrective regimen sliding scale   SubCutaneous four times a day before meals  insulin lispro Injectable (HumaLOG) 6 Unit(s) SubCutaneous three times a day before meals  metoprolol tartrate 50 milliGRAM(s) Oral two times a day  pantoprazole    Tablet 40 milliGRAM(s) Oral before breakfast  simvastatin 20 milliGRAM(s) Oral at bedtime  sodium chloride 0.9% lock flush 3 milliLiter(s) IV Push every 8 hours    MEDICATIONS  (PRN):  acetaminophen   Tablet .. 650 milliGRAM(s) Oral every 6 hours PRN Mild Pain (1 - 3), Moderate Pain (4 - 6)  dextrose 40% Gel 15 Gram(s) Oral once PRN Blood Glucose LESS THAN 70 milliGRAM(s)/deciliter  glucagon  Injectable 1 milliGRAM(s) IntraMuscular once PRN Glucose LESS THAN 70 milligrams/deciliter        Labs:                        12.5   6.37  )-----------( 143      ( 02 Dec 2019 06:12 )             40.0     12-02    138  |  102  |  20.0  ----------------------------<  210<H>  3.9   |  22.0  |  0.91    Ca    9.0      02 Dec 2019 06:12  Mg     2.0     12-02      PTT - ( 02 Dec 2019 06:12 )  PTT:78.3 sec    Blood Type: Type + Screen (11.29.19 @ 19:42)    ABO RH Interpretation: B POS      HGB A1C: Hemoglobin A1C, Whole Blood: 10.3 % (11-28 @ 08:15)    Prealbumin: Prealbumin, Serum: 18 mg/dL (12-02 @ 06:12)    Pro-BNP: Serum Pro-Brain Natriuretic Peptide: 91 pg/mL (11-29 @ 19:42)    Thyroid Panel: 11-29 @ 19:42/2.71  --/--/--    MRSA: MRSA PCR Result.: NotDetec (11-29 @ 23:17)   / MSSA:       CXR:   < from: Xray Chest 1 View AP/PA. (11.29.19 @ 18:08) >  The lungs are clear. The heart and mediastinum are unremarkable.  No   hilar abnormality is seen.  There is no evidence of pleural effusion. The   visualized osseous structures demonstrate degenerative changes in the   spine.    < end of copied text >    EKG:  < from: 12 Lead ECG (11.29.19 @ 18:08) >  Normal sinus rhythm    < end of copied text >  < from: 12 Lead ECG (11.29.19 @ 18:08) >  Ventricular Rate 65 BPM    Atrial Rate 65 BPM    < end of copied text >    Carotid Duplex:    < from: US Duplex Carotid Arteries Complete, Bilateral (11.29.19 @ 20:39) >   No evidence of critical stenosis or occlusion, right or left   carotid system    < end of copied text >    PFT's:    Echocardiogram:  < from: TTE Echo Complete w/Doppler (11.29.19 @ 19:23) >  1. Left ventricular ejection fraction, by visual estimation, is 55 to   60%.   2. Normal global left ventricular systolic function.   3. Spectral Doppler shows impaired relaxation pattern of left   ventricular myocardial filling (Grade I diastolic dysfunction).   4. Mild mitral annular calcification.   5. Thickening of the anterior and posterior mitral valve leaflets.   6. Sclerotic aortic valve with normal opening.      < end of copied text >    Cardiac catheterization:  LMCA: Minor irregularities.    LAD: Large caliber vessel.     Prox LAD: 95% stenosis.The lesion was discrete.The lesion showed tandem   arrangment.     Comments:in stent stenosis.     Dist LAD: 80% stenosis.The lesion was tubular.     Comments:Distal vessel small sized, no disease.     1st Diag: Ostial.70% stenosis.The lesion was discrete.     Comments:Jailed side branch. Distal vessel small sized, minor   irregularities.     2nd Diag:     Comments:Moderate sized, no disease.    LCx: Large caliber vessel.     1st Ob Ya: Ostial.40% stenosis.The lesion was discrete.     Comments:Distal vessel small sized, minor irregularities.     2nd Ob Ya: Proximal subsection.95% stenosis.The lesion was discrete.The   lesion showed tandem arrangment.     Comments:Distal vessel moderate sized, minor irregularities.    RCA:     Prox RCA: 95% stenosis.The lesion was discrete and eccentric.     Comments:In stent stenosis. Distal vessel large sized, minor   irregularities.    VA  LV function assessed as:Normal.  Ejection Fraction  +----------------------------------------------------------------------+---  +  !Method                                                                  !EF%!  +----------------------------------------------------------------------+---  +  !LV gram                                                                 !60 !  +-----------  Vein Mapping: completed TBurns    Gen: WN/WD NAD  Neuro: AAOx3, nonfocal  Pulm: CTA B/L  CV: RRR, S1S2  Abd: Soft, NT, ND +BS  Ext: No edema, + peripheral pulses      Pt has AICD/PPM [ ] Yes  [x ] No             Brand Name:  Pre-op Beta Blocker ordered within 24 hrs of surgery?  [x ] Yes  [ ] No  If not, Why?  Type & Cross  [ x] Yes  [ ] No  NPO after Midnight [ x] Yes  [ ] No  Pre-op ABX ordered, to be taped on chart:  [ x] Yes  [ ] No     Hibiclens/Peridex ordered [x ] Yes  [ ] No  Intraop on Hold:  CXR, MAE [x ]   Consent obtained  [ ] Yes  [ ] No

## 2019-12-03 ENCOUNTER — APPOINTMENT (OUTPATIENT)
Dept: CARDIOTHORACIC SURGERY | Facility: HOSPITAL | Age: 69
End: 2019-12-03

## 2019-12-03 LAB
ALBUMIN SERPL ELPH-MCNC: 2.8 G/DL — LOW (ref 3.3–5.2)
ALP SERPL-CCNC: 52 U/L — SIGNIFICANT CHANGE UP (ref 40–120)
ALT FLD-CCNC: 24 U/L — SIGNIFICANT CHANGE UP
ANION GAP SERPL CALC-SCNC: 10 MMOL/L — SIGNIFICANT CHANGE UP (ref 5–17)
APTT BLD: 27.5 SEC — SIGNIFICANT CHANGE UP (ref 27.5–36.3)
AST SERPL-CCNC: 58 U/L — HIGH
BASOPHILS # BLD AUTO: 0.03 K/UL — SIGNIFICANT CHANGE UP (ref 0–0.2)
BASOPHILS NFR BLD AUTO: 0.2 % — SIGNIFICANT CHANGE UP (ref 0–2)
BILIRUB SERPL-MCNC: 0.9 MG/DL — SIGNIFICANT CHANGE UP (ref 0.4–2)
BLD GP AB SCN SERPL QL: SIGNIFICANT CHANGE UP
BUN SERPL-MCNC: 16 MG/DL — SIGNIFICANT CHANGE UP (ref 8–20)
CALCIUM SERPL-MCNC: 7.6 MG/DL — LOW (ref 8.6–10.2)
CHLORIDE SERPL-SCNC: 105 MMOL/L — SIGNIFICANT CHANGE UP (ref 98–107)
CK MB CFR SERPL CALC: 38.3 NG/ML — HIGH (ref 0–6.7)
CK SERPL-CCNC: 568 U/L — HIGH (ref 30–200)
CO2 SERPL-SCNC: 25 MMOL/L — SIGNIFICANT CHANGE UP (ref 22–29)
CREAT SERPL-MCNC: 0.81 MG/DL — SIGNIFICANT CHANGE UP (ref 0.5–1.3)
EOSINOPHIL # BLD AUTO: 0.17 K/UL — SIGNIFICANT CHANGE UP (ref 0–0.5)
EOSINOPHIL NFR BLD AUTO: 1 % — SIGNIFICANT CHANGE UP (ref 0–6)
GAS PNL BLDA: SIGNIFICANT CHANGE UP
GAS PNL BLDA: SIGNIFICANT CHANGE UP
GLUCOSE BLDC GLUCOMTR-MCNC: 129 MG/DL — HIGH (ref 70–99)
GLUCOSE BLDC GLUCOMTR-MCNC: 143 MG/DL — HIGH (ref 70–99)
GLUCOSE BLDC GLUCOMTR-MCNC: 153 MG/DL — HIGH (ref 70–99)
GLUCOSE BLDC GLUCOMTR-MCNC: 157 MG/DL — HIGH (ref 70–99)
GLUCOSE BLDC GLUCOMTR-MCNC: 170 MG/DL — HIGH (ref 70–99)
GLUCOSE BLDC GLUCOMTR-MCNC: 180 MG/DL — HIGH (ref 70–99)
GLUCOSE BLDC GLUCOMTR-MCNC: 187 MG/DL — HIGH (ref 70–99)
GLUCOSE BLDC GLUCOMTR-MCNC: 211 MG/DL — HIGH (ref 70–99)
GLUCOSE SERPL-MCNC: 198 MG/DL — HIGH (ref 70–115)
HCT VFR BLD CALC: 24.8 % — LOW (ref 39–50)
HCT VFR BLD CALC: 33.2 % — LOW (ref 39–50)
HCT VFR BLD CALC: 40.1 % — SIGNIFICANT CHANGE UP (ref 39–50)
HGB BLD-MCNC: 10.5 G/DL — LOW (ref 13–17)
HGB BLD-MCNC: 12.7 G/DL — LOW (ref 13–17)
HGB BLD-MCNC: 7.8 G/DL — LOW (ref 13–17)
IMM GRANULOCYTES NFR BLD AUTO: 1.5 % — SIGNIFICANT CHANGE UP (ref 0–1.5)
INR BLD: 1.41 RATIO — HIGH (ref 0.88–1.16)
LYMPHOCYTES # BLD AUTO: 21.2 % — SIGNIFICANT CHANGE UP (ref 13–44)
LYMPHOCYTES # BLD AUTO: 3.45 K/UL — HIGH (ref 1–3.3)
MAGNESIUM SERPL-MCNC: 1.6 MG/DL — SIGNIFICANT CHANGE UP (ref 1.6–2.6)
MCHC RBC-ENTMCNC: 26.1 PG — LOW (ref 27–34)
MCHC RBC-ENTMCNC: 26.3 PG — LOW (ref 27–34)
MCHC RBC-ENTMCNC: 26.3 PG — LOW (ref 27–34)
MCHC RBC-ENTMCNC: 31.5 GM/DL — LOW (ref 32–36)
MCHC RBC-ENTMCNC: 31.6 GM/DL — LOW (ref 32–36)
MCHC RBC-ENTMCNC: 31.7 GM/DL — LOW (ref 32–36)
MCV RBC AUTO: 82.5 FL — SIGNIFICANT CHANGE UP (ref 80–100)
MCV RBC AUTO: 83.2 FL — SIGNIFICANT CHANGE UP (ref 80–100)
MCV RBC AUTO: 83.5 FL — SIGNIFICANT CHANGE UP (ref 80–100)
MONOCYTES # BLD AUTO: 0.68 K/UL — SIGNIFICANT CHANGE UP (ref 0–0.9)
MONOCYTES NFR BLD AUTO: 4.2 % — SIGNIFICANT CHANGE UP (ref 2–14)
NEUTROPHILS # BLD AUTO: 11.66 K/UL — HIGH (ref 1.8–7.4)
NEUTROPHILS NFR BLD AUTO: 71.9 % — SIGNIFICANT CHANGE UP (ref 43–77)
PLATELET # BLD AUTO: 103 K/UL — LOW (ref 150–400)
PLATELET # BLD AUTO: 131 K/UL — LOW (ref 150–400)
PLATELET # BLD AUTO: 137 K/UL — LOW (ref 150–400)
POTASSIUM SERPL-MCNC: 4.2 MMOL/L — SIGNIFICANT CHANGE UP (ref 3.5–5.3)
POTASSIUM SERPL-SCNC: 4.2 MMOL/L — SIGNIFICANT CHANGE UP (ref 3.5–5.3)
PROT SERPL-MCNC: 4.8 G/DL — LOW (ref 6.6–8.7)
PROTHROM AB SERPL-ACNC: 16.4 SEC — HIGH (ref 10–12.9)
RBC # BLD: 2.97 M/UL — LOW (ref 4.2–5.8)
RBC # BLD: 3.99 M/UL — LOW (ref 4.2–5.8)
RBC # BLD: 4.86 M/UL — SIGNIFICANT CHANGE UP (ref 4.2–5.8)
RBC # FLD: 14.1 % — SIGNIFICANT CHANGE UP (ref 10.3–14.5)
RBC # FLD: 14.3 % — SIGNIFICANT CHANGE UP (ref 10.3–14.5)
RBC # FLD: 14.3 % — SIGNIFICANT CHANGE UP (ref 10.3–14.5)
SODIUM SERPL-SCNC: 140 MMOL/L — SIGNIFICANT CHANGE UP (ref 135–145)
TROPONIN T SERPL-MCNC: 0.03 NG/ML — SIGNIFICANT CHANGE UP (ref 0–0.06)
TROPONIN T SERPL-MCNC: 0.88 NG/ML — HIGH (ref 0–0.06)
WBC # BLD: 11.23 K/UL — HIGH (ref 3.8–10.5)
WBC # BLD: 16.24 K/UL — HIGH (ref 3.8–10.5)
WBC # BLD: 6.89 K/UL — SIGNIFICANT CHANGE UP (ref 3.8–10.5)
WBC # FLD AUTO: 11.23 K/UL — HIGH (ref 3.8–10.5)
WBC # FLD AUTO: 16.24 K/UL — HIGH (ref 3.8–10.5)
WBC # FLD AUTO: 6.89 K/UL — SIGNIFICANT CHANGE UP (ref 3.8–10.5)

## 2019-12-03 PROCEDURE — 33519 CABG ARTERY-VEIN THREE: CPT

## 2019-12-03 PROCEDURE — 33533 CABG ARTERIAL SINGLE: CPT | Mod: AS

## 2019-12-03 PROCEDURE — 33519 CABG ARTERY-VEIN THREE: CPT | Mod: AS

## 2019-12-03 PROCEDURE — 93010 ELECTROCARDIOGRAM REPORT: CPT

## 2019-12-03 PROCEDURE — 33533 CABG ARTERIAL SINGLE: CPT

## 2019-12-03 PROCEDURE — 71045 X-RAY EXAM CHEST 1 VIEW: CPT | Mod: 26

## 2019-12-03 PROCEDURE — 33508 ENDOSCOPIC VEIN HARVEST: CPT

## 2019-12-03 PROCEDURE — 93010 ELECTROCARDIOGRAM REPORT: CPT | Mod: 77

## 2019-12-03 RX ORDER — CEFUROXIME AXETIL 250 MG
1500 TABLET ORAL EVERY 8 HOURS
Refills: 0 | Status: DISCONTINUED | OUTPATIENT
Start: 2019-12-03 | End: 2019-12-03

## 2019-12-03 RX ORDER — PROTAMINE SULFATE 10 MG/ML
50 AMPUL (ML) INTRAVENOUS ONCE
Refills: 0 | Status: COMPLETED | OUTPATIENT
Start: 2019-12-03 | End: 2019-12-03

## 2019-12-03 RX ORDER — NOREPINEPHRINE BITARTRATE/D5W 8 MG/250ML
0.05 PLASTIC BAG, INJECTION (ML) INTRAVENOUS
Qty: 8 | Refills: 0 | Status: DISCONTINUED | OUTPATIENT
Start: 2019-12-03 | End: 2019-12-04

## 2019-12-03 RX ORDER — INSULIN HUMAN 100 [IU]/ML
2 INJECTION, SOLUTION SUBCUTANEOUS
Qty: 250 | Refills: 0 | Status: DISCONTINUED | OUTPATIENT
Start: 2019-12-03 | End: 2019-12-05

## 2019-12-03 RX ORDER — ALBUMIN HUMAN 25 %
250 VIAL (ML) INTRAVENOUS ONCE
Refills: 0 | Status: COMPLETED | OUTPATIENT
Start: 2019-12-03 | End: 2019-12-03

## 2019-12-03 RX ORDER — POTASSIUM CHLORIDE 20 MEQ
10 PACKET (EA) ORAL
Refills: 0 | Status: DISCONTINUED | OUTPATIENT
Start: 2019-12-03 | End: 2019-12-04

## 2019-12-03 RX ORDER — POLYETHYLENE GLYCOL 3350 17 G/17G
17 POWDER, FOR SOLUTION ORAL DAILY
Refills: 0 | Status: DISCONTINUED | OUTPATIENT
Start: 2019-12-04 | End: 2019-12-09

## 2019-12-03 RX ORDER — NITROGLYCERIN 6.5 MG
0.4 CAPSULE, EXTENDED RELEASE ORAL ONCE
Refills: 0 | Status: COMPLETED | OUTPATIENT
Start: 2019-12-03 | End: 2019-12-03

## 2019-12-03 RX ORDER — SODIUM CHLORIDE 9 MG/ML
1000 INJECTION INTRAMUSCULAR; INTRAVENOUS; SUBCUTANEOUS
Refills: 0 | Status: DISCONTINUED | OUTPATIENT
Start: 2019-12-03 | End: 2019-12-05

## 2019-12-03 RX ORDER — MAGNESIUM SULFATE 500 MG/ML
2 VIAL (ML) INJECTION ONCE
Refills: 0 | Status: COMPLETED | OUTPATIENT
Start: 2019-12-03 | End: 2019-12-03

## 2019-12-03 RX ORDER — PANTOPRAZOLE SODIUM 20 MG/1
40 TABLET, DELAYED RELEASE ORAL ONCE
Refills: 0 | Status: COMPLETED | OUTPATIENT
Start: 2019-12-03 | End: 2019-12-03

## 2019-12-03 RX ORDER — DEXTROSE 50 % IN WATER 50 %
50 SYRINGE (ML) INTRAVENOUS
Refills: 0 | Status: DISCONTINUED | OUTPATIENT
Start: 2019-12-03 | End: 2019-12-09

## 2019-12-03 RX ORDER — ASPIRIN/CALCIUM CARB/MAGNESIUM 324 MG
325 TABLET ORAL DAILY
Refills: 0 | Status: DISCONTINUED | OUTPATIENT
Start: 2019-12-04 | End: 2019-12-09

## 2019-12-03 RX ORDER — POTASSIUM CHLORIDE 20 MEQ
10 PACKET (EA) ORAL
Refills: 0 | Status: COMPLETED | OUTPATIENT
Start: 2019-12-03 | End: 2019-12-03

## 2019-12-03 RX ORDER — DEXTROSE 50 % IN WATER 50 %
25 SYRINGE (ML) INTRAVENOUS
Refills: 0 | Status: DISCONTINUED | OUTPATIENT
Start: 2019-12-03 | End: 2019-12-09

## 2019-12-03 RX ORDER — SODIUM CHLORIDE 9 MG/ML
250 INJECTION, SOLUTION INTRAVENOUS ONCE
Refills: 0 | Status: COMPLETED | OUTPATIENT
Start: 2019-12-03 | End: 2019-12-03

## 2019-12-03 RX ORDER — MEPERIDINE HYDROCHLORIDE 50 MG/ML
25 INJECTION INTRAMUSCULAR; INTRAVENOUS; SUBCUTANEOUS ONCE
Refills: 0 | Status: DISCONTINUED | OUTPATIENT
Start: 2019-12-03 | End: 2019-12-03

## 2019-12-03 RX ORDER — CEFUROXIME AXETIL 250 MG
1500 TABLET ORAL EVERY 8 HOURS
Refills: 0 | Status: COMPLETED | OUTPATIENT
Start: 2019-12-03 | End: 2019-12-04

## 2019-12-03 RX ORDER — PANTOPRAZOLE SODIUM 20 MG/1
40 TABLET, DELAYED RELEASE ORAL DAILY
Refills: 0 | Status: DISCONTINUED | OUTPATIENT
Start: 2019-12-04 | End: 2019-12-09

## 2019-12-03 RX ORDER — CHLORHEXIDINE GLUCONATE 213 G/1000ML
15 SOLUTION TOPICAL EVERY 12 HOURS
Refills: 0 | Status: DISCONTINUED | OUTPATIENT
Start: 2019-12-03 | End: 2019-12-04

## 2019-12-03 RX ORDER — VANCOMYCIN HCL 1 G
1000 VIAL (EA) INTRAVENOUS EVERY 12 HOURS
Refills: 0 | Status: DISCONTINUED | OUTPATIENT
Start: 2019-12-03 | End: 2019-12-03

## 2019-12-03 RX ADMIN — Medication 100 MILLIEQUIVALENT(S): at 14:47

## 2019-12-03 RX ADMIN — Medication 100 MILLIGRAM(S): at 23:57

## 2019-12-03 RX ADMIN — SODIUM CHLORIDE 10 MILLILITER(S): 9 INJECTION INTRAMUSCULAR; INTRAVENOUS; SUBCUTANEOUS at 14:30

## 2019-12-03 RX ADMIN — INSULIN HUMAN 2 UNIT(S)/HR: 100 INJECTION, SOLUTION SUBCUTANEOUS at 14:30

## 2019-12-03 RX ADMIN — Medication 50 GRAM(S): at 21:05

## 2019-12-03 RX ADMIN — Medication 100 MILLIEQUIVALENT(S): at 19:10

## 2019-12-03 RX ADMIN — SODIUM CHLORIDE 3 MILLILITER(S): 9 INJECTION INTRAMUSCULAR; INTRAVENOUS; SUBCUTANEOUS at 05:19

## 2019-12-03 RX ADMIN — Medication 50 GRAM(S): at 19:45

## 2019-12-03 RX ADMIN — SODIUM CHLORIDE 1500 MILLILITER(S): 9 INJECTION, SOLUTION INTRAVENOUS at 14:57

## 2019-12-03 RX ADMIN — CHLORHEXIDINE GLUCONATE 15 MILLILITER(S): 213 SOLUTION TOPICAL at 05:19

## 2019-12-03 RX ADMIN — Medication 100 MILLIEQUIVALENT(S): at 18:10

## 2019-12-03 RX ADMIN — CHLORHEXIDINE GLUCONATE 1 APPLICATION(S): 213 SOLUTION TOPICAL at 05:20

## 2019-12-03 RX ADMIN — Medication 100 MILLIEQUIVALENT(S): at 18:47

## 2019-12-03 RX ADMIN — CHLORHEXIDINE GLUCONATE 15 MILLILITER(S): 213 SOLUTION TOPICAL at 17:03

## 2019-12-03 RX ADMIN — Medication 220 MILLIGRAM(S): at 14:47

## 2019-12-03 RX ADMIN — Medication 100 MILLIEQUIVALENT(S): at 15:08

## 2019-12-03 RX ADMIN — SODIUM CHLORIDE 750 MILLILITER(S): 9 INJECTION, SOLUTION INTRAVENOUS at 15:30

## 2019-12-03 RX ADMIN — Medication 12.5 MILLIGRAM(S): at 05:19

## 2019-12-03 RX ADMIN — INSULIN HUMAN 2 UNIT(S)/HR: 100 INJECTION, SOLUTION SUBCUTANEOUS at 16:53

## 2019-12-03 RX ADMIN — PANTOPRAZOLE SODIUM 40 MILLIGRAM(S): 20 TABLET, DELAYED RELEASE ORAL at 05:19

## 2019-12-03 RX ADMIN — Medication 9.02 MICROGRAM(S)/KG/MIN: at 14:30

## 2019-12-03 RX ADMIN — SODIUM CHLORIDE 5 MILLILITER(S): 9 INJECTION INTRAMUSCULAR; INTRAVENOUS; SUBCUTANEOUS at 14:30

## 2019-12-03 RX ADMIN — Medication 100 MILLIEQUIVALENT(S): at 15:55

## 2019-12-03 RX ADMIN — Medication 4: at 06:02

## 2019-12-03 RX ADMIN — PANTOPRAZOLE SODIUM 40 MILLIGRAM(S): 20 TABLET, DELAYED RELEASE ORAL at 14:42

## 2019-12-03 RX ADMIN — Medication 100 MILLIGRAM(S): at 15:53

## 2019-12-03 RX ADMIN — Medication 750 MILLILITER(S): at 16:45

## 2019-12-03 NOTE — BRIEF OPERATIVE NOTE - COMMENTS
No qualified resident was available to assist in this case. I have personally first assisted the Cardiothoracic Surgeon listed in this brief op note throughout the entirety of this case.  Patient was transported to CT ICU in stable condition on levophed and Insulin

## 2019-12-03 NOTE — CHART NOTE - NSCHARTNOTEFT_GEN_A_CORE
Call received from bedside RN to evaluate the patient c/o chest pain. Assessed the patient in bed, c/o left sided chest pain, non- radiating, constant, aching, lasted for 15 min. Patient denies any shortness of breath, dyspnea, diaphoresis, dizziness, nausea or vomiting. Patient stated that the pain was started when he was lying on his side and resolved when he turned on to his back. Patient denies worsening of pain with change of position or lying flat. At the time of assessment, patient denies chest pain and refused Nitroglycerine.    Plan:    Nitroglycerine 0.4mg sublingual x 1 dose stat (patient refused)  EKG stat to r/o acute MI  Troponin level to r/o NSTEMI  Cont. Heparin until on call to OR  Will discuss the plan with Dr. Pires in AM

## 2019-12-04 DIAGNOSIS — Z29.9 ENCOUNTER FOR PROPHYLACTIC MEASURES, UNSPECIFIED: ICD-10-CM

## 2019-12-04 DIAGNOSIS — E13.9 OTHER SPECIFIED DIABETES MELLITUS WITHOUT COMPLICATIONS: ICD-10-CM

## 2019-12-04 DIAGNOSIS — I10 ESSENTIAL (PRIMARY) HYPERTENSION: ICD-10-CM

## 2019-12-04 DIAGNOSIS — T82.855A STENOSIS OF CORONARY ARTERY STENT, INITIAL ENCOUNTER: ICD-10-CM
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
## 2019-12-04 DIAGNOSIS — I21.9 ACUTE MYOCARDIAL INFARCTION, UNSPECIFIED: ICD-10-CM
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
## 2019-12-04 DIAGNOSIS — E78.00 PURE HYPERCHOLESTEROLEMIA, UNSPECIFIED: ICD-10-CM

## 2019-12-04 DIAGNOSIS — Z79.84 LONG TERM (CURRENT) USE OF ORAL HYPOGLYCEMIC DRUGS: ICD-10-CM

## 2019-12-04 DIAGNOSIS — M81.0 AGE-RELATED OSTEOPOROSIS WITHOUT CURRENT PATHOLOGICAL FRACTURE: ICD-10-CM

## 2019-12-04 DIAGNOSIS — M19.90 UNSPECIFIED OSTEOARTHRITIS, UNSPECIFIED SITE: ICD-10-CM

## 2019-12-04 DIAGNOSIS — I25.119 ATHEROSCLEROTIC HEART DISEASE OF NATIVE CORONARY ARTERY WITH UNSPECIFIED ANGINA PECTORIS: ICD-10-CM

## 2019-12-04 DIAGNOSIS — Z79.82 LONG TERM (CURRENT) USE OF ASPIRIN: ICD-10-CM

## 2019-12-04 DIAGNOSIS — K21.9 GASTRO-ESOPHAGEAL REFLUX DISEASE WITHOUT ESOPHAGITIS: ICD-10-CM

## 2019-12-04 LAB
ALBUMIN SERPL ELPH-MCNC: 3.6 G/DL — SIGNIFICANT CHANGE UP (ref 3.3–5.2)
ALP SERPL-CCNC: 47 U/L — SIGNIFICANT CHANGE UP (ref 40–120)
ALT FLD-CCNC: 26 U/L — SIGNIFICANT CHANGE UP
ANION GAP SERPL CALC-SCNC: 13 MMOL/L — SIGNIFICANT CHANGE UP (ref 5–17)
APTT BLD: 28.9 SEC — SIGNIFICANT CHANGE UP (ref 27.5–36.3)
AST SERPL-CCNC: 91 U/L — HIGH
BILIRUB SERPL-MCNC: 0.7 MG/DL — SIGNIFICANT CHANGE UP (ref 0.4–2)
BUN SERPL-MCNC: 13 MG/DL — SIGNIFICANT CHANGE UP (ref 8–20)
CALCIUM SERPL-MCNC: 8 MG/DL — LOW (ref 8.6–10.2)
CHLORIDE SERPL-SCNC: 104 MMOL/L — SIGNIFICANT CHANGE UP (ref 98–107)
CK MB CFR SERPL CALC: 54.2 NG/ML — HIGH (ref 0–6.7)
CK SERPL-CCNC: 1083 U/L — HIGH (ref 30–200)
CO2 SERPL-SCNC: 24 MMOL/L — SIGNIFICANT CHANGE UP (ref 22–29)
CREAT SERPL-MCNC: 0.96 MG/DL — SIGNIFICANT CHANGE UP (ref 0.5–1.3)
GAS PNL BLDA: SIGNIFICANT CHANGE UP
GLUCOSE BLDC GLUCOMTR-MCNC: 116 MG/DL — HIGH (ref 70–99)
GLUCOSE BLDC GLUCOMTR-MCNC: 116 MG/DL — HIGH (ref 70–99)
GLUCOSE BLDC GLUCOMTR-MCNC: 119 MG/DL — HIGH (ref 70–99)
GLUCOSE BLDC GLUCOMTR-MCNC: 121 MG/DL — HIGH (ref 70–99)
GLUCOSE BLDC GLUCOMTR-MCNC: 122 MG/DL — HIGH (ref 70–99)
GLUCOSE BLDC GLUCOMTR-MCNC: 124 MG/DL — HIGH (ref 70–99)
GLUCOSE BLDC GLUCOMTR-MCNC: 124 MG/DL — HIGH (ref 70–99)
GLUCOSE BLDC GLUCOMTR-MCNC: 125 MG/DL — HIGH (ref 70–99)
GLUCOSE BLDC GLUCOMTR-MCNC: 126 MG/DL — HIGH (ref 70–99)
GLUCOSE BLDC GLUCOMTR-MCNC: 127 MG/DL — HIGH (ref 70–99)
GLUCOSE BLDC GLUCOMTR-MCNC: 130 MG/DL — HIGH (ref 70–99)
GLUCOSE BLDC GLUCOMTR-MCNC: 131 MG/DL — HIGH (ref 70–99)
GLUCOSE BLDC GLUCOMTR-MCNC: 135 MG/DL — HIGH (ref 70–99)
GLUCOSE BLDC GLUCOMTR-MCNC: 140 MG/DL — HIGH (ref 70–99)
GLUCOSE BLDC GLUCOMTR-MCNC: 142 MG/DL — HIGH (ref 70–99)
GLUCOSE BLDC GLUCOMTR-MCNC: 150 MG/DL — HIGH (ref 70–99)
GLUCOSE BLDC GLUCOMTR-MCNC: 156 MG/DL — HIGH (ref 70–99)
GLUCOSE SERPL-MCNC: 130 MG/DL — HIGH (ref 70–115)
HCT VFR BLD CALC: 27.4 % — LOW (ref 39–50)
HCT VFR BLD CALC: 28.7 % — LOW (ref 39–50)
HGB BLD-MCNC: 9 G/DL — LOW (ref 13–17)
HGB BLD-MCNC: 9.3 G/DL — LOW (ref 13–17)
INR BLD: 1.14 RATIO — SIGNIFICANT CHANGE UP (ref 0.88–1.16)
MAGNESIUM SERPL-MCNC: 2.8 MG/DL — HIGH (ref 1.6–2.6)
MCHC RBC-ENTMCNC: 27 PG — SIGNIFICANT CHANGE UP (ref 27–34)
MCHC RBC-ENTMCNC: 27.2 PG — SIGNIFICANT CHANGE UP (ref 27–34)
MCHC RBC-ENTMCNC: 32.4 GM/DL — SIGNIFICANT CHANGE UP (ref 32–36)
MCHC RBC-ENTMCNC: 32.8 GM/DL — SIGNIFICANT CHANGE UP (ref 32–36)
MCV RBC AUTO: 82.3 FL — SIGNIFICANT CHANGE UP (ref 80–100)
MCV RBC AUTO: 83.9 FL — SIGNIFICANT CHANGE UP (ref 80–100)
PLATELET # BLD AUTO: 117 K/UL — LOW (ref 150–400)
PLATELET # BLD AUTO: 136 K/UL — LOW (ref 150–400)
POTASSIUM SERPL-MCNC: 3.9 MMOL/L — SIGNIFICANT CHANGE UP (ref 3.5–5.3)
POTASSIUM SERPL-SCNC: 3.9 MMOL/L — SIGNIFICANT CHANGE UP (ref 3.5–5.3)
PROT SERPL-MCNC: 5.8 G/DL — LOW (ref 6.6–8.7)
PROTHROM AB SERPL-ACNC: 13.2 SEC — HIGH (ref 10–12.9)
RBC # BLD: 3.33 M/UL — LOW (ref 4.2–5.8)
RBC # BLD: 3.42 M/UL — LOW (ref 4.2–5.8)
RBC # FLD: 14.2 % — SIGNIFICANT CHANGE UP (ref 10.3–14.5)
RBC # FLD: 14.7 % — HIGH (ref 10.3–14.5)
SODIUM SERPL-SCNC: 141 MMOL/L — SIGNIFICANT CHANGE UP (ref 135–145)
TROPONIN T SERPL-MCNC: 1.7 NG/ML — HIGH (ref 0–0.06)
WBC # BLD: 10.7 K/UL — HIGH (ref 3.8–10.5)
WBC # BLD: 11.24 K/UL — HIGH (ref 3.8–10.5)
WBC # FLD AUTO: 10.7 K/UL — HIGH (ref 3.8–10.5)
WBC # FLD AUTO: 11.24 K/UL — HIGH (ref 3.8–10.5)

## 2019-12-04 PROCEDURE — 93010 ELECTROCARDIOGRAM REPORT: CPT

## 2019-12-04 PROCEDURE — 99232 SBSQ HOSP IP/OBS MODERATE 35: CPT

## 2019-12-04 PROCEDURE — 71045 X-RAY EXAM CHEST 1 VIEW: CPT | Mod: 26

## 2019-12-04 RX ORDER — FUROSEMIDE 40 MG
20 TABLET ORAL ONCE
Refills: 0 | Status: COMPLETED | OUTPATIENT
Start: 2019-12-04 | End: 2019-12-04

## 2019-12-04 RX ORDER — FENTANYL CITRATE 50 UG/ML
50 INJECTION INTRAVENOUS ONCE
Refills: 0 | Status: DISCONTINUED | OUTPATIENT
Start: 2019-12-04 | End: 2019-12-04

## 2019-12-04 RX ORDER — POTASSIUM CHLORIDE 20 MEQ
10 PACKET (EA) ORAL
Refills: 0 | Status: COMPLETED | OUTPATIENT
Start: 2019-12-04 | End: 2019-12-04

## 2019-12-04 RX ORDER — ACETAMINOPHEN 500 MG
1000 TABLET ORAL ONCE
Refills: 0 | Status: COMPLETED | OUTPATIENT
Start: 2019-12-04 | End: 2019-12-04

## 2019-12-04 RX ORDER — ENOXAPARIN SODIUM 100 MG/ML
40 INJECTION SUBCUTANEOUS DAILY
Refills: 0 | Status: DISCONTINUED | OUTPATIENT
Start: 2019-12-04 | End: 2019-12-09

## 2019-12-04 RX ORDER — METOPROLOL TARTRATE 50 MG
25 TABLET ORAL
Refills: 0 | Status: DISCONTINUED | OUTPATIENT
Start: 2019-12-04 | End: 2019-12-05

## 2019-12-04 RX ORDER — SIMVASTATIN 20 MG/1
20 TABLET, FILM COATED ORAL AT BEDTIME
Refills: 0 | Status: DISCONTINUED | OUTPATIENT
Start: 2019-12-04 | End: 2019-12-09

## 2019-12-04 RX ORDER — SODIUM CHLORIDE 9 MG/ML
1000 INJECTION, SOLUTION INTRAVENOUS
Refills: 0 | Status: DISCONTINUED | OUTPATIENT
Start: 2019-12-04 | End: 2019-12-05

## 2019-12-04 RX ORDER — METOPROLOL TARTRATE 50 MG
25 TABLET ORAL
Refills: 0 | Status: DISCONTINUED | OUTPATIENT
Start: 2019-12-04 | End: 2019-12-04

## 2019-12-04 RX ORDER — ACETAMINOPHEN 500 MG
1000 TABLET ORAL ONCE
Refills: 0 | Status: DISCONTINUED | OUTPATIENT
Start: 2019-12-04 | End: 2019-12-04

## 2019-12-04 RX ORDER — SENNA PLUS 8.6 MG/1
2 TABLET ORAL AT BEDTIME
Refills: 0 | Status: DISCONTINUED | OUTPATIENT
Start: 2019-12-04 | End: 2019-12-09

## 2019-12-04 RX ORDER — NOREPINEPHRINE BITARTRATE/D5W 8 MG/250ML
0.05 PLASTIC BAG, INJECTION (ML) INTRAVENOUS
Qty: 8 | Refills: 0 | Status: DISCONTINUED | OUTPATIENT
Start: 2019-12-04 | End: 2019-12-04

## 2019-12-04 RX ORDER — DEXTROSE 50 % IN WATER 50 %
15 SYRINGE (ML) INTRAVENOUS ONCE
Refills: 0 | Status: DISCONTINUED | OUTPATIENT
Start: 2019-12-04 | End: 2019-12-09

## 2019-12-04 RX ORDER — GLUCAGON INJECTION, SOLUTION 0.5 MG/.1ML
1 INJECTION, SOLUTION SUBCUTANEOUS ONCE
Refills: 0 | Status: DISCONTINUED | OUTPATIENT
Start: 2019-12-04 | End: 2019-12-05

## 2019-12-04 RX ORDER — ALBUMIN HUMAN 25 %
250 VIAL (ML) INTRAVENOUS ONCE
Refills: 0 | Status: COMPLETED | OUTPATIENT
Start: 2019-12-04 | End: 2019-12-04

## 2019-12-04 RX ORDER — INSULIN LISPRO 100/ML
4 VIAL (ML) SUBCUTANEOUS
Refills: 0 | Status: DISCONTINUED | OUTPATIENT
Start: 2019-12-04 | End: 2019-12-09

## 2019-12-04 RX ORDER — CLOPIDOGREL BISULFATE 75 MG/1
75 TABLET, FILM COATED ORAL DAILY
Refills: 0 | Status: DISCONTINUED | OUTPATIENT
Start: 2019-12-04 | End: 2019-12-09

## 2019-12-04 RX ADMIN — Medication 400 MILLIGRAM(S): at 06:48

## 2019-12-04 RX ADMIN — Medication 100 MILLIEQUIVALENT(S): at 05:00

## 2019-12-04 RX ADMIN — INSULIN HUMAN 2 UNIT(S)/HR: 100 INJECTION, SOLUTION SUBCUTANEOUS at 11:06

## 2019-12-04 RX ADMIN — Medication 325 MILLIGRAM(S): at 11:05

## 2019-12-04 RX ADMIN — SENNA PLUS 2 TABLET(S): 8.6 TABLET ORAL at 23:03

## 2019-12-04 RX ADMIN — Medication 4 UNIT(S): at 16:55

## 2019-12-04 RX ADMIN — Medication 20 MILLIGRAM(S): at 21:52

## 2019-12-04 RX ADMIN — Medication 100 MILLIGRAM(S): at 07:53

## 2019-12-04 RX ADMIN — FENTANYL CITRATE 50 MICROGRAM(S): 50 INJECTION INTRAVENOUS at 20:40

## 2019-12-04 RX ADMIN — PANTOPRAZOLE SODIUM 40 MILLIGRAM(S): 20 TABLET, DELAYED RELEASE ORAL at 11:05

## 2019-12-04 RX ADMIN — SODIUM CHLORIDE 10 MILLILITER(S): 9 INJECTION INTRAMUSCULAR; INTRAVENOUS; SUBCUTANEOUS at 07:54

## 2019-12-04 RX ADMIN — INSULIN HUMAN 2 UNIT(S)/HR: 100 INJECTION, SOLUTION SUBCUTANEOUS at 07:53

## 2019-12-04 RX ADMIN — Medication 100 MILLIEQUIVALENT(S): at 06:04

## 2019-12-04 RX ADMIN — FENTANYL CITRATE 50 MICROGRAM(S): 50 INJECTION INTRAVENOUS at 20:55

## 2019-12-04 RX ADMIN — Medication 125 MILLILITER(S): at 06:40

## 2019-12-04 RX ADMIN — SODIUM CHLORIDE 5 MILLILITER(S): 9 INJECTION INTRAMUSCULAR; INTRAVENOUS; SUBCUTANEOUS at 07:53

## 2019-12-04 RX ADMIN — SIMVASTATIN 20 MILLIGRAM(S): 20 TABLET, FILM COATED ORAL at 23:03

## 2019-12-04 RX ADMIN — CLOPIDOGREL BISULFATE 75 MILLIGRAM(S): 75 TABLET, FILM COATED ORAL at 11:05

## 2019-12-04 RX ADMIN — Medication 400 MILLIGRAM(S): at 12:29

## 2019-12-04 RX ADMIN — Medication 1000 MILLIGRAM(S): at 12:59

## 2019-12-04 RX ADMIN — Medication 400 MILLIGRAM(S): at 19:58

## 2019-12-04 RX ADMIN — Medication 1000 MILLIGRAM(S): at 20:35

## 2019-12-04 RX ADMIN — Medication 100 MILLIEQUIVALENT(S): at 04:14

## 2019-12-04 RX ADMIN — Medication 1000 MILLIGRAM(S): at 07:03

## 2019-12-04 RX ADMIN — ENOXAPARIN SODIUM 40 MILLIGRAM(S): 100 INJECTION SUBCUTANEOUS at 11:05

## 2019-12-04 RX ADMIN — POLYETHYLENE GLYCOL 3350 17 GRAM(S): 17 POWDER, FOR SOLUTION ORAL at 11:06

## 2019-12-04 RX ADMIN — Medication 4 UNIT(S): at 11:20

## 2019-12-04 RX ADMIN — Medication 25 MILLIGRAM(S): at 16:55

## 2019-12-04 NOTE — PROGRESS NOTE ADULT - ASSESSMENT
Well controlled diabetes on insulin drip, currently 5 units an hour. Pre-op was well controlled on 20 units of lantus; higher requirement for IV insulin likely due to post-op state. If pt. continues to require 4 or more units/hour may be difficult to transition to basal/bolus without becoming hyperglycemic. Can start lantus tonight, but would also maintain drip if necessary until glycemic control stabilizes.

## 2019-12-04 NOTE — PHYSICAL THERAPY INITIAL EVALUATION ADULT - GENERAL OBSERVATIONS, REHAB EVAL
Pt observed reclined in recliner chair, pleasant, cooperative, A & O and c/o 9/10 sternal incisional pain

## 2019-12-04 NOTE — PHYSICAL THERAPY INITIAL EVALUATION ADULT - PHYSICAL ASSIST/NONPHYSICAL ASSIST: STAND/SIT, REHAB EVAL
1 person + 1 person to manage equipment/verbal cues/verbal cues re proper sequence + proper hand placement in prep to perform transfer; verbal cues to perform transfer in accordance with sternal precautions

## 2019-12-04 NOTE — PROGRESS NOTE ADULT - ASSESSMENT
69 year old Male patient with a PMHx of CAD s/p stents x4 (last cath 2013), GERD, HLD, DM.  Initially presented to [facility] ED with complaint of chest pain radiating down his right arm. States pain usually lasts for about 15 minutes and subsides on its own. Patient denies any aggravating factors. Patient states pain is located in the middle of his chest and sometimes will radiate to R arm.  Patient denied any SOB, but did admit to SOB on exertion for the past few weeks. Denied any abd pain, N/V/D/C. In ED, patient received ASA 325mg x1, CXR done negative for acute cardiopulmonary process. Patient labs significant for elevation in troponin- 0.155-->0.305-->0.384. He underwent cardiac cath at  and was found to have triple vessel CAD.  Per the patient, he was given Plavix at [place] as well.  Patient was transferred to [facility] for surgical evaluation and placed on a heparin gtt for unstable angina 11/29/19.     Now s/p C4L 12/3 with right saphenous vein harvest and 1uPRBCs given intraop. Patient transferred to CTICU on levo and insulin. Required 4FFP and plts postop. 12/4 Patient off levo, weaning vent as able, still drowsy this am.

## 2019-12-04 NOTE — PHYSICAL THERAPY INITIAL EVALUATION ADULT - PHYSICAL ASSIST/NONPHYSICAL ASSIST: GAIT, REHAB EVAL
verbal cues/1 person + 1 person to manage equipment/verbal cues re proper gait sequence + proper use of RW; required intermittent physical assistance to maneuver RW during ambulation

## 2019-12-04 NOTE — PHYSICAL THERAPY INITIAL EVALUATION ADULT - DISCHARGE DISPOSITION, PT EVAL
home w/ assist/home w/ home PT/home with assist, RW + home PT pending progress and pending stair assessment

## 2019-12-04 NOTE — PROGRESS NOTE ADULT - PROBLEM SELECTOR PLAN 4
A1C 10.3.  Continue Insulin gtt for postop sternal wound ppx, titrate per manjeet scale  Endocrine consult appreciated

## 2019-12-04 NOTE — DIETITIAN INITIAL EVALUATION ADULT. - PERTINENT LABORATORY DATA
12-04 Na141 mmol/L Glu 130 mg/dL<H> K+ 3.9 mmol/L Cr  0.96 mg/dL BUN 13.0 mg/dL Phos n/a   Alb 3.6 g/dL PAB n/a     n/a  HgbA1C

## 2019-12-04 NOTE — PHYSICAL THERAPY INITIAL EVALUATION ADULT - PLANNED THERAPY INTERVENTIONS, PT EVAL
gait training/transfer training/balance training/bed mobility training/strengthening/ROM/postural re-education

## 2019-12-04 NOTE — PHYSICAL THERAPY INITIAL EVALUATION ADULT - MANUAL MUSCLE TESTING RESULTS, REHAB EVAL
grossly assessed due to/bilateral UE's grossly assessed against gravity (elbows + shoulder flex not beyond 90 degrees) due to sternal precautions; bilateral LE's grossly assessed 3+5

## 2019-12-04 NOTE — PROGRESS NOTE ADULT - SUBJECTIVE AND OBJECTIVE BOX
Subjective: Patient is currently intubated and sedated. Unable to obtain full ROS at this time.     T(C): 37.7 (12-04-19 @ 02:00), Max: 38 (12-03-19 @ 22:00)  HR: 105 (12-04-19 @ 02:15) (72 - 121)  BP: 103/73 (12-03-19 @ 15:00) (103/73 - 160/80)  ABP: 117/51 (12-04-19 @ 02:15) (84/46 - 149/56)  ABP(mean): 68 (12-04-19 @ 02:15) (57 - 85)  RR: 20 (12-04-19 @ 02:15) (10 - 24)  SpO2: 98% (12-04-19 @ 02:15) (97% - 100%)  CVP(mm Hg): 9 (12-04-19 @ 02:15) (2 - 9)  Mode: CPAP with PS  FiO2: 30  PEEP: 5  PS: 5  MAP: 8      I&O's Detail    02 Dec 2019 07:01  -  03 Dec 2019 07:00  --------------------------------------------------------  IN:    heparin  Infusion.: 120 mL    Oral Fluid: 1250 mL  Total IN: 1370 mL    OUT:  Total OUT: 0 mL    Total NET: 1370 mL      03 Dec 2019 07:01  -  04 Dec 2019 02:49  --------------------------------------------------------  IN:    Albumin 5%  - 250 mL: 250 mL    insulin regular Infusion: 75 mL    Lactated Ringers IV Bolus: 500 mL    norepinephrine Infusion: 20 mL    Packed Red Blood Cells: 360 mL    Plasma: 866 mL    Platelets - Single Donor: 232 mL    sodium chloride 0.9%.: 130 mL    sodium chloride 0.9%.: 65 mL    Solution: 250 mL    Solution: 50 mL    Solution: 100 mL    Solution: 100 mL  Total IN: 2998 mL    OUT:    Chest Tube: 290 mL    Chest Tube: 760 mL    Indwelling Catheter - Urethral: 2335 mL  Total OUT: 3385 mL    Total NET: -387 mL          LABS: All Lab data reviewed and analyzed                        7.8    11.23 )-----------( 131      ( 03 Dec 2019 17:44 )             24.8     12-03    140  |  105  |  16.0  ----------------------------<  198<H>  4.2   |  25.0  |  0.81    Ca    7.6<L>      03 Dec 2019 14:24  Mg     1.6     12-03    TPro  4.8<L>  /  Alb  2.8<L>  /  TBili  0.9  /  DBili  x   /  AST  58<H>  /  ALT  24  /  AlkPhos  52  12-03    PT/INR - ( 03 Dec 2019 14:24 )   PT: 16.4 sec;   INR: 1.41 ratio         PTT - ( 03 Dec 2019 14:24 )  PTT:27.5 sec  CARDIAC MARKERS ( 03 Dec 2019 14:24 )   U/L / CKMB38.3 ng/mL / Troponin T0.88 ng/mL /  CARDIAC MARKERS ( 03 Dec 2019 06:25 )  CKx     / CKMBx     / Troponin T0.03 ng/mL /      ABG - ( 03 Dec 2019 17:44 )  pH, Arterial: 7.42  pH, Blood: x     /  pCO2: 42    /  pO2: 138   / HCO3: 27    / Base Excess: 2.8   /  SaO2: 98          CAPILLARY BLOOD GLUCOSE  POCT Blood Glucose.: 130 mg/dL (04 Dec 2019 01:54)  POCT Blood Glucose.: 129 mg/dL (03 Dec 2019 23:53)  POCT Blood Glucose.: 143 mg/dL (03 Dec 2019 21:51)  POCT Blood Glucose.: 153 mg/dL (03 Dec 2019 20:49)  POCT Blood Glucose.: 157 mg/dL (03 Dec 2019 19:53)  POCT Blood Glucose.: 170 mg/dL (03 Dec 2019 18:36)  POCT Blood Glucose.: 187 mg/dL (03 Dec 2019 16:18)  POCT Blood Glucose.: 180 mg/dL (03 Dec 2019 15:34)  POCT Blood Glucose.: 211 mg/dL (03 Dec 2019 05:51)           RADIOLOGY: - Reviewed and analyzed     12/4/19 CXR: pending    12/4/19 EKG: pending      HOSPITAL MEDICATIONS: All medications reviewed and analyzed  MEDICATIONS  (STANDING):  aspirin enteric coated 325 milliGRAM(s) Oral daily  cefuroxime  IVPB 1500 milliGRAM(s) IV Intermittent once  cefuroxime  IVPB 1500 milliGRAM(s) IV Intermittent every 8 hours  chlorhexidine 0.12% Liquid 15 milliLiter(s) Oral Mucosa every 12 hours  insulin regular Infusion 2 Unit(s)/Hr (2 mL/Hr) IV Continuous   norepinephrine Infusion 0.05 MICROgram(s)/kG/Min (9.019 mL/Hr) IV Continuous   pantoprazole    Tablet 40 milliGRAM(s) Oral daily  polyethylene glycol 3350 17 Gram(s) Oral daily  sodium chloride 0.9%. 1000 milliLiter(s) (10 mL/Hr) IV Continuous   sodium chloride 0.9%. 1000 milliLiter(s) (5 mL/Hr) IV Continuous         Physical Exam  Neuro: Intubated and drowsy. Opens eyes, Arousable, RASS -1  HEENT:  NCAT, No conjuctival edema or icterus, no thrush.  +ETT/OGT  Neck:  RIJ introducer with site C/D/I. supple, trachea midline, no tracheostomy  Pulm: CTA, good air entry, equal bilaterally, no rales/rhonchi/wheezing, no accessory muscle use noted  Chest:        mediastinal CT and left pleural CT all with dressings intact and no air leak, no subQ emphysema       +PW (settings: VVI, rate: 50, mA:10, sensitivity: 0.8)  CV: regular rate, regular rhythm, +S1S2, no murmur or rub noted  Abd: soft, NT, ND, + BS  : garcia in situ to bedside drainage  Ext: MACK x 4, no edema, no cyanosis or clubbing, distal motor/neuro/circ intact  Skin: warm, dry, well perfused  Incisions: midsternal C/D/I/stable w/ mepilex dressing, RLE C/D/I w/ dressing and Ace wrap

## 2019-12-04 NOTE — PHYSICAL THERAPY INITIAL EVALUATION ADULT - ADDITIONAL COMMENTS
Pt lives with spouse, son and three young grandchildren (son works during the day but wife is home during the day, both physically able to assist pt with all needs) in a 1 story house with 1 LEANN no handrails, no stairs inside and bed & bath on ground level. Pt's PLOF was independent in all ADL's + ambulation without an Assistive Device, working as a  and driving PTA. Pt has no DME in home. At this time, RW, bedside commode and shower chair &/or tub transfer bench DME recommended upon d/c.

## 2019-12-04 NOTE — PHYSICAL THERAPY INITIAL EVALUATION ADULT - LEVEL OF INDEPENDENCE: SIT/SUPINE, REHAB EVAL
unable to perform/pt was observed and returned to sitting in recliner chair, will require further assessment

## 2019-12-04 NOTE — PHYSICAL THERAPY INITIAL EVALUATION ADULT - CRITERIA FOR SKILLED THERAPEUTIC INTERVENTIONS
risk reduction/prevention/functional limitations in following categories/predicted duration of therapy intervention/rehab potential/anticipated equipment needs at discharge/therapy frequency/anticipated discharge recommendation/impairments found

## 2019-12-04 NOTE — PHYSICAL THERAPY INITIAL EVALUATION ADULT - BED MOBILITY LIMITATIONS, REHAB EVAL
unable to perform due to pt observed and returned to sitting in recliner chair, will require further assessment

## 2019-12-04 NOTE — PROGRESS NOTE ADULT - SUBJECTIVE AND OBJECTIVE BOX
INTERVAL HPI/OVERNIGHT EVENTS: follow up of diabetes    MEDICATIONS  (STANDING):  aspirin enteric coated 325 milliGRAM(s) Oral daily  cefuroxime  IVPB 1500 milliGRAM(s) IV Intermittent once  clopidogrel Tablet 75 milliGRAM(s) Oral daily  dextrose 5%. 1000 milliLiter(s) (50 mL/Hr) IV Continuous <Continuous>  dextrose 50% Injectable 50 milliLiter(s) IV Push every 15 minutes  dextrose 50% Injectable 25 milliLiter(s) IV Push every 15 minutes  enoxaparin Injectable 40 milliGRAM(s) SubCutaneous daily  insulin lispro Injectable (HumaLOG) 4 Unit(s) SubCutaneous three times a day before meals  insulin regular Infusion 2 Unit(s)/Hr (2 mL/Hr) IV Continuous <Continuous>  norepinephrine Infusion 0.05 MICROgram(s)/kG/Min (9.019 mL/Hr) IV Continuous <Continuous>  pantoprazole    Tablet 40 milliGRAM(s) Oral daily  polyethylene glycol 3350 17 Gram(s) Oral daily  senna 2 Tablet(s) Oral at bedtime  simvastatin 20 milliGRAM(s) Oral at bedtime  sodium chloride 0.9%. 1000 milliLiter(s) (10 mL/Hr) IV Continuous <Continuous>  sodium chloride 0.9%. 1000 milliLiter(s) (5 mL/Hr) IV Continuous <Continuous>    MEDICATIONS  (PRN):  dextrose 40% Gel 15 Gram(s) Oral once PRN Blood Glucose LESS THAN 70 milliGRAM(s)/deciLiter  glucagon  Injectable 1 milliGRAM(s) IntraMuscular once PRN Glucose <70 milliGRAM(s)/deciLiter      Allergies      Intolerances    Beef (Other (Mod to Severe))  Chicken (Other (Mod to Severe))  No Pork (Unknown)      Review of systems: beginning to eat    Vital Signs Last 24 Hrs  T(C): 37.2 (04 Dec 2019 12:00), Max: 38 (03 Dec 2019 22:00)  T(F): 99 (04 Dec 2019 12:00), Max: 100.4 (03 Dec 2019 22:00)  HR: 109 (04 Dec 2019 13:15) (93 - 121)  BP: 114/54 (04 Dec 2019 08:00) (90/55 - 114/54)  BP(mean): 78 (04 Dec 2019 08:00) (70 - 78)  RR: 18 (04 Dec 2019 13:15) (7 - 27)  SpO2: 98% (04 Dec 2019 13:15) (96% - 100%)      LABS:                        9.0    11.24 )-----------( 136      ( 04 Dec 2019 02:48 )             27.4     12-04    141  |  104  |  13.0  ----------------------------<  130<H>  3.9   |  24.0  |  0.96    Ca    8.0<L>      04 Dec 2019 02:48  Mg     2.8     12-04    TPro  5.8<L>  /  Alb  3.6  /  TBili  0.7  /  DBili  x   /  AST  91<H>  /  ALT  26  /  AlkPhos  47  12-04          POCT Blood Glucose.: 121 mg/dL (12-04-19 @ 14:08)  POCT Blood Glucose.: 116 mg/dL (12-04-19 @ 13:13)  POCT Blood Glucose.: 124 mg/dL (12-04-19 @ 12:14)  POCT Blood Glucose.: 131 mg/dL (12-04-19 @ 11:17)  POCT Blood Glucose.: 142 mg/dL (12-04-19 @ 09:52)  POCT Blood Glucose.: 150 mg/dL (12-04-19 @ 09:11)  POCT Blood Glucose.: 122 mg/dL (12-04-19 @ 07:31)  POCT Blood Glucose.: 124 mg/dL (12-04-19 @ 06:01)  POCT Blood Glucose.: 125 mg/dL (12-04-19 @ 03:56)  POCT Blood Glucose.: 130 mg/dL (12-04-19 @ 01:54)  POCT Blood Glucose.: 129 mg/dL (12-03-19 @ 23:53)  POCT Blood Glucose.: 143 mg/dL (12-03-19 @ 21:51)  POCT Blood Glucose.: 153 mg/dL (12-03-19 @ 20:49)  POCT Blood Glucose.: 157 mg/dL (12-03-19 @ 19:53)  POCT Blood Glucose.: 170 mg/dL (12-03-19 @ 18:36)  POCT Blood Glucose.: 187 mg/dL (12-03-19 @ 16:18)  POCT Blood Glucose.: 180 mg/dL (12-03-19 @ 15:34)  POCT Blood Glucose.: 211 mg/dL (12-03-19 @ 05:51)  POCT Blood Glucose.: 191 mg/dL (12-02-19 @ 21:24)  POCT Blood Glucose.: 162 mg/dL (12-02-19 @ 17:08)  POCT Blood Glucose.: 211 mg/dL (12-02-19 @ 12:21)  POCT Blood Glucose.: 206 mg/dL (12-02-19 @ 08:14)  POCT Blood Glucose.: 252 mg/dL (12-01-19 @ 21:51)  POCT Blood Glucose.: 170 mg/dL (12-01-19 @ 17:07)

## 2019-12-04 NOTE — DIETITIAN INITIAL EVALUATION ADULT. - OTHER INFO
69 year old Male patient with a PMHx of CAD s/p stents x4 (last cath 2013), GERD, HLD, DM.  Initially presented to [facility] ED with complaint of chest pain radiating down his right arm. States pain usually lasts for about 15 minutes and subsides on its own. Patient denies any aggravating factors. Patient states pain is located in the middle of his chest and sometimes will radiate to R arm.  Patient denied any SOB, but did admit to SOB on exertion for the past few weeks. Denied any abd pain, N/V/D/C. In ED, patient received ASA 325mg x1, CXR done negative for acute cardiopulmonary process. Patient labs significant for elevation in troponin- 0.155-->0.305-->0.384. He underwent cardiac cath at  and was found to have triple vessel CAD.  Per the patient, he was given Plavix at [place] as well.  Patient was transferred to [facility] for surgical evaluation. Now s/p C4L 12/3 with right saphenous vein harvest. Pt s/p extubation, remains drowsy at time of visit. Spoke with son at bedside; reports pt tolerated liquids well this morning. Pt does not follow any specific diets PTA, but knows he needs to start. [employer] food preferences obtained. Provided meal planning with plate model diet literature to son, son requesting further literature when pt more awake and alert.

## 2019-12-04 NOTE — PHYSICAL THERAPY INITIAL EVALUATION ADULT - DIAGNOSIS, PT EVAL
Decreased functional status and mobility due to decrease strength, transfers, endurance, balance and ambulation + stair tolerance

## 2019-12-04 NOTE — PROGRESS NOTE ADULT - PROBLEM SELECTOR PLAN 1
Neurologically intact but drowsy still.  Hemodynamically stable.  Continue Aspirin for acute graft closure prophylaxis.  Consider Lipitor for chronic graft patency prophylaxis.  Currently intubated, weaning vent as tolerated. NPO.   No diuresis at this time, replete electrolytes PRN, garcia catheter to remain in place POD #1 for urine output monitoring in critically ill patient.  Insulin infusion with FS Q1H (titrated per CTICU protocol) for glycemic control.  Plan for OOB to chair and PT consult once extubated.   Pain control PRN, holding for now as pt currently drowsy postop.  Continue post-op abx prophylaxis.  Continue with supportive ICU care as needed.  Above plan of care to be discussed with CT surgical team on am rounds

## 2019-12-05 LAB
ANION GAP SERPL CALC-SCNC: 13 MMOL/L — SIGNIFICANT CHANGE UP (ref 5–17)
BUN SERPL-MCNC: 14 MG/DL — SIGNIFICANT CHANGE UP (ref 8–20)
CALCIUM SERPL-MCNC: 7.9 MG/DL — LOW (ref 8.6–10.2)
CHLORIDE SERPL-SCNC: 102 MMOL/L — SIGNIFICANT CHANGE UP (ref 98–107)
CO2 SERPL-SCNC: 23 MMOL/L — SIGNIFICANT CHANGE UP (ref 22–29)
CREAT SERPL-MCNC: 0.84 MG/DL — SIGNIFICANT CHANGE UP (ref 0.5–1.3)
GLUCOSE BLDC GLUCOMTR-MCNC: 115 MG/DL — HIGH (ref 70–99)
GLUCOSE BLDC GLUCOMTR-MCNC: 118 MG/DL — HIGH (ref 70–99)
GLUCOSE BLDC GLUCOMTR-MCNC: 126 MG/DL — HIGH (ref 70–99)
GLUCOSE BLDC GLUCOMTR-MCNC: 134 MG/DL — HIGH (ref 70–99)
GLUCOSE BLDC GLUCOMTR-MCNC: 135 MG/DL — HIGH (ref 70–99)
GLUCOSE BLDC GLUCOMTR-MCNC: 145 MG/DL — HIGH (ref 70–99)
GLUCOSE BLDC GLUCOMTR-MCNC: 152 MG/DL — HIGH (ref 70–99)
GLUCOSE BLDC GLUCOMTR-MCNC: 153 MG/DL — HIGH (ref 70–99)
GLUCOSE BLDC GLUCOMTR-MCNC: 154 MG/DL — HIGH (ref 70–99)
GLUCOSE BLDC GLUCOMTR-MCNC: 165 MG/DL — HIGH (ref 70–99)
GLUCOSE BLDC GLUCOMTR-MCNC: 165 MG/DL — HIGH (ref 70–99)
GLUCOSE BLDC GLUCOMTR-MCNC: 173 MG/DL — HIGH (ref 70–99)
GLUCOSE BLDC GLUCOMTR-MCNC: 177 MG/DL — HIGH (ref 70–99)
GLUCOSE BLDC GLUCOMTR-MCNC: 187 MG/DL — HIGH (ref 70–99)
GLUCOSE BLDC GLUCOMTR-MCNC: 193 MG/DL — HIGH (ref 70–99)
GLUCOSE BLDC GLUCOMTR-MCNC: 201 MG/DL — HIGH (ref 70–99)
GLUCOSE BLDC GLUCOMTR-MCNC: 84 MG/DL — SIGNIFICANT CHANGE UP (ref 70–99)
GLUCOSE SERPL-MCNC: 125 MG/DL — HIGH (ref 70–115)
HCT VFR BLD CALC: 28 % — LOW (ref 39–50)
HCT VFR BLD CALC: 28.3 % — LOW (ref 39–50)
HGB BLD-MCNC: 8.9 G/DL — LOW (ref 13–17)
HGB BLD-MCNC: 9 G/DL — LOW (ref 13–17)
MAGNESIUM SERPL-MCNC: 2.2 MG/DL — SIGNIFICANT CHANGE UP (ref 1.6–2.6)
MCHC RBC-ENTMCNC: 27.1 PG — SIGNIFICANT CHANGE UP (ref 27–34)
MCHC RBC-ENTMCNC: 27.4 PG — SIGNIFICANT CHANGE UP (ref 27–34)
MCHC RBC-ENTMCNC: 31.8 GM/DL — LOW (ref 32–36)
MCHC RBC-ENTMCNC: 31.8 GM/DL — LOW (ref 32–36)
MCV RBC AUTO: 85.4 FL — SIGNIFICANT CHANGE UP (ref 80–100)
MCV RBC AUTO: 86 FL — SIGNIFICANT CHANGE UP (ref 80–100)
PLATELET # BLD AUTO: 102 K/UL — LOW (ref 150–400)
PLATELET # BLD AUTO: 119 K/UL — LOW (ref 150–400)
POTASSIUM SERPL-MCNC: 3.6 MMOL/L — SIGNIFICANT CHANGE UP (ref 3.5–5.3)
POTASSIUM SERPL-SCNC: 3.6 MMOL/L — SIGNIFICANT CHANGE UP (ref 3.5–5.3)
RBC # BLD: 3.28 M/UL — LOW (ref 4.2–5.8)
RBC # BLD: 3.29 M/UL — LOW (ref 4.2–5.8)
RBC # FLD: 15.1 % — HIGH (ref 10.3–14.5)
RBC # FLD: 15.2 % — HIGH (ref 10.3–14.5)
SODIUM SERPL-SCNC: 138 MMOL/L — SIGNIFICANT CHANGE UP (ref 135–145)
WBC # BLD: 10.26 K/UL — SIGNIFICANT CHANGE UP (ref 3.8–10.5)
WBC # BLD: 11.89 K/UL — HIGH (ref 3.8–10.5)
WBC # FLD AUTO: 10.26 K/UL — SIGNIFICANT CHANGE UP (ref 3.8–10.5)
WBC # FLD AUTO: 11.89 K/UL — HIGH (ref 3.8–10.5)

## 2019-12-05 PROCEDURE — 93010 ELECTROCARDIOGRAM REPORT: CPT

## 2019-12-05 PROCEDURE — 71045 X-RAY EXAM CHEST 1 VIEW: CPT | Mod: 26

## 2019-12-05 RX ORDER — METOPROLOL TARTRATE 50 MG
5 TABLET ORAL ONCE
Refills: 0 | Status: COMPLETED | OUTPATIENT
Start: 2019-12-05 | End: 2019-12-05

## 2019-12-05 RX ORDER — FUROSEMIDE 40 MG
20 TABLET ORAL ONCE
Refills: 0 | Status: COMPLETED | OUTPATIENT
Start: 2019-12-05 | End: 2019-12-05

## 2019-12-05 RX ORDER — NOREPINEPHRINE BITARTRATE/D5W 8 MG/250ML
0.05 PLASTIC BAG, INJECTION (ML) INTRAVENOUS
Qty: 8 | Refills: 0 | Status: DISCONTINUED | OUTPATIENT
Start: 2019-12-05 | End: 2019-12-05

## 2019-12-05 RX ORDER — METOPROLOL TARTRATE 50 MG
50 TABLET ORAL EVERY 12 HOURS
Refills: 0 | Status: DISCONTINUED | OUTPATIENT
Start: 2019-12-05 | End: 2019-12-07

## 2019-12-05 RX ORDER — INSULIN LISPRO 100/ML
VIAL (ML) SUBCUTANEOUS
Refills: 0 | Status: DISCONTINUED | OUTPATIENT
Start: 2019-12-05 | End: 2019-12-09

## 2019-12-05 RX ORDER — METOPROLOL TARTRATE 50 MG
50 TABLET ORAL EVERY 12 HOURS
Refills: 0 | Status: DISCONTINUED | OUTPATIENT
Start: 2019-12-05 | End: 2019-12-05

## 2019-12-05 RX ORDER — FENTANYL CITRATE 50 UG/ML
25 INJECTION INTRAVENOUS ONCE
Refills: 0 | Status: DISCONTINUED | OUTPATIENT
Start: 2019-12-05 | End: 2019-12-05

## 2019-12-05 RX ORDER — POTASSIUM CHLORIDE 20 MEQ
10 PACKET (EA) ORAL
Refills: 0 | Status: COMPLETED | OUTPATIENT
Start: 2019-12-05 | End: 2019-12-05

## 2019-12-05 RX ORDER — ALBUMIN HUMAN 25 %
250 VIAL (ML) INTRAVENOUS ONCE
Refills: 0 | Status: COMPLETED | OUTPATIENT
Start: 2019-12-05 | End: 2019-12-05

## 2019-12-05 RX ORDER — METOPROLOL TARTRATE 50 MG
25 TABLET ORAL ONCE
Refills: 0 | Status: COMPLETED | OUTPATIENT
Start: 2019-12-05 | End: 2019-12-05

## 2019-12-05 RX ORDER — INSULIN GLARGINE 100 [IU]/ML
16 INJECTION, SOLUTION SUBCUTANEOUS AT BEDTIME
Refills: 0 | Status: DISCONTINUED | OUTPATIENT
Start: 2019-12-05 | End: 2019-12-06

## 2019-12-05 RX ADMIN — Medication 4 UNIT(S): at 16:42

## 2019-12-05 RX ADMIN — ENOXAPARIN SODIUM 40 MILLIGRAM(S): 100 INJECTION SUBCUTANEOUS at 11:21

## 2019-12-05 RX ADMIN — SENNA PLUS 2 TABLET(S): 8.6 TABLET ORAL at 21:23

## 2019-12-05 RX ADMIN — Medication 5 MILLIGRAM(S): at 22:04

## 2019-12-05 RX ADMIN — Medication 100 MILLIEQUIVALENT(S): at 05:09

## 2019-12-05 RX ADMIN — Medication 325 MILLIGRAM(S): at 11:21

## 2019-12-05 RX ADMIN — Medication 4: at 21:28

## 2019-12-05 RX ADMIN — Medication 125 MILLILITER(S): at 07:59

## 2019-12-05 RX ADMIN — Medication 20 MILLIGRAM(S): at 17:52

## 2019-12-05 RX ADMIN — INSULIN GLARGINE 16 UNIT(S): 100 INJECTION, SOLUTION SUBCUTANEOUS at 21:43

## 2019-12-05 RX ADMIN — PANTOPRAZOLE SODIUM 40 MILLIGRAM(S): 20 TABLET, DELAYED RELEASE ORAL at 11:22

## 2019-12-05 RX ADMIN — SIMVASTATIN 20 MILLIGRAM(S): 20 TABLET, FILM COATED ORAL at 21:23

## 2019-12-05 RX ADMIN — POLYETHYLENE GLYCOL 3350 17 GRAM(S): 17 POWDER, FOR SOLUTION ORAL at 11:22

## 2019-12-05 RX ADMIN — Medication 4 UNIT(S): at 11:24

## 2019-12-05 RX ADMIN — Medication 50 MILLIGRAM(S): at 20:18

## 2019-12-05 RX ADMIN — Medication 25 MILLIGRAM(S): at 05:09

## 2019-12-05 RX ADMIN — Medication 100 MILLIEQUIVALENT(S): at 08:00

## 2019-12-05 RX ADMIN — Medication 100 MILLIEQUIVALENT(S): at 06:28

## 2019-12-05 RX ADMIN — FENTANYL CITRATE 25 MICROGRAM(S): 50 INJECTION INTRAVENOUS at 02:30

## 2019-12-05 RX ADMIN — Medication 25 MILLIGRAM(S): at 06:28

## 2019-12-05 RX ADMIN — Medication 5 MILLIGRAM(S): at 05:09

## 2019-12-05 RX ADMIN — FENTANYL CITRATE 25 MICROGRAM(S): 50 INJECTION INTRAVENOUS at 02:20

## 2019-12-05 RX ADMIN — CLOPIDOGREL BISULFATE 75 MILLIGRAM(S): 75 TABLET, FILM COATED ORAL at 11:21

## 2019-12-05 NOTE — PROGRESS NOTE ADULT - ASSESSMENT
69 year old Male patient with a PMHx of CAD s/p stents x4 (last cath 2013), GERD, HLD, DM.  Initially presented to [facility] ED with complaint of chest pain radiating down his right arm. States pain usually lasts for about 15 minutes and subsides on its own. Patient denies any aggravating factors. Patient states pain is located in the middle of his chest and sometimes will radiate to R arm.  Patient denied any SOB, but did admit to SOB on exertion for the past few weeks. Denied any abd pain, N/V/D/C. In ED, patient received ASA 325mg x1, CXR done negative for acute cardiopulmonary process. Patient labs significant for elevation in troponin- 0.155-->0.305-->0.384. He underwent cardiac cath at  and was found to have triple vessel CAD.  Per the patient, he was given Plavix at [place] as well.  Patient was transferred to [facility] for surgical evaluation and placed on a heparin gtt for unstable angina 11/29/19.     Now s/p C4L 12/3 with right saphenous vein harvest and 1uPRBCs given intraop. Patient transferred to CTICU on levo and insulin. Required 4FFP and plts postop. 12/4 Patient off levo, weaning vent as able, still drowsy this am.       12/5 pt diureising with prn lasix, glycemic control with INsulin gtt.

## 2019-12-05 NOTE — PROGRESS NOTE ADULT - SUBJECTIVE AND OBJECTIVE BOX
pt seen and examined,  pt in NSR on tele.   pt tolerating BB/  DAPT, statin . Glycemic control at present on insulin gtt.   chest tube with min output ,  pt is net neg so far today .       aspirin enteric coated 325 milliGRAM(s) Oral daily  cefuroxime  IVPB 1500 milliGRAM(s) IV Intermittent once  clopidogrel Tablet 75 milliGRAM(s) Oral daily  dextrose 40% Gel 15 Gram(s) Oral once PRN  dextrose 5%. 1000 milliLiter(s) IV Continuous <Continuous>  dextrose 50% Injectable 50 milliLiter(s) IV Push every 15 minutes  dextrose 50% Injectable 25 milliLiter(s) IV Push every 15 minutes  enoxaparin Injectable 40 milliGRAM(s) SubCutaneous daily  glucagon  Injectable 1 milliGRAM(s) IntraMuscular once PRN  insulin lispro Injectable (HumaLOG) 4 Unit(s) SubCutaneous three times a day before meals  insulin regular Infusion 2 Unit(s)/Hr IV Continuous <Continuous>  metoprolol tartrate 25 milliGRAM(s) Oral two times a day  pantoprazole    Tablet 40 milliGRAM(s) Oral daily  polyethylene glycol 3350 17 Gram(s) Oral daily  senna 2 Tablet(s) Oral at bedtime  simvastatin 20 milliGRAM(s) Oral at bedtime  sodium chloride 0.9%. 1000 milliLiter(s) IV Continuous <Continuous>  sodium chloride 0.9%. 1000 milliLiter(s) IV Continuous <Continuous>                            9.3    10.70 )-----------( 117      ( 04 Dec 2019 17:08 )             28.7       Hemoglobin: 9.3 g/dL (12-04 @ 17:08)  Hemoglobin: 9.0 g/dL (12-04 @ 02:48)  Hemoglobin: 7.8 g/dL (12-03 @ 17:44)  Hemoglobin: 10.5 g/dL (12-03 @ 14:24)  Hemoglobin: 12.7 g/dL (12-03 @ 06:25)      12-04    141  |  104  |  13.0  ----------------------------<  130<H>  3.9   |  24.0  |  0.96    Ca    8.0<L>      04 Dec 2019 02:48  Mg     2.8     12-04    TPro  5.8<L>  /  Alb  3.6  /  TBili  0.7  /  DBili  x   /  AST  91<H>  /  ALT  26  /  AlkPhos  47  12-04    Creatinine Trend: 0.96<--, 0.81<--, 0.91<--, 0.95<--, 1.04<--, 0.95<--    COAGS:     CARDIAC MARKERS ( 04 Dec 2019 02:48 )  x     / 1.70 ng/mL / 1083 U/L / x     / 54.2 ng/mL  CARDIAC MARKERS ( 03 Dec 2019 14:24 )  x     / 0.88 ng/mL / 568 U/L / x     / 38.3 ng/mL  CARDIAC MARKERS ( 03 Dec 2019 06:25 )  x     / 0.03 ng/mL / x     / x     / x            T(C): 37.1 (12-05-19 @ 00:00), Max: 37.8 (12-04-19 @ 05:00)  HR: 102 (12-05-19 @ 00:00) (93 - 126)  BP: 124/57 (12-04-19 @ 17:21) (90/55 - 124/57)  RR: 22 (12-05-19 @ 00:00) (7 - 27)  SpO2: 97% (12-05-19 @ 00:00) (92% - 100%)  Wt(kg): --    I&O's Summary    03 Dec 2019 07:01  -  04 Dec 2019 07:00  --------------------------------------------------------  IN: 3803 mL / OUT: 3915 mL / NET: -112 mL    04 Dec 2019 07:01  -  05 Dec 2019 01:32  --------------------------------------------------------  IN: 1639 mL / OUT: 1660 mL / NET: -21 mL          Physical Exam  Neuro: Intubated and drowsy. Opens eyes, Arousable, RASS -1  HEENT:  NCAT, No conjuctival edema or icterus, no thrush.  +ETT/OGT  Neck:  RIJ introducer with site C/D/I. supple, trachea midline, no tracheostomy  Pulm: CTA, good air entry, equal bilaterally, no rales/rhonchi/wheezing, no accessory muscle use noted  Chest:    mediastinal CT and left pleural CT all with dressings intact and no air leak, no subQ emphysema       +PW (settings: VVI, rate: 50, mA:10, sensitivity: 0.8)  CV: regular rate, regular rhythm, +S1S2, no murmur or rub noted  Abd: soft, NT, ND, + BS  : garcia in situ to bedside drainage  Ext: MACK x 4, no edema, no cyanosis or clubbing, distal motor/neuro/circ intact  Skin: warm, dry, well perfused  Incisions: midsternal C/D/I/stable w/ mepilex dressing, RLE C/D/I w/ dressing and Ace wrap

## 2019-12-05 NOTE — PROGRESS NOTE ADULT - PROBLEM SELECTOR PLAN 1
Hemodynamically stable.  Continue Aspirin for acute graft closure prophylaxis.  Consider Lipitor for chronic graft patency prophylaxis.  garcia catheter to remain    Insulin infusion with FS Q1H (titrated per CTICU protocol) for glycemic control.  Plan for OOB to chair and PT consult once extubated.   Pain control PRN   Continue post-op abx prophylaxis.  Continue with supportive ICU care as needed.  Above plan of care to be discussed with CT surgical team on am rounds

## 2019-12-06 LAB
ANION GAP SERPL CALC-SCNC: 13 MMOL/L — SIGNIFICANT CHANGE UP (ref 5–17)
BUN SERPL-MCNC: 19 MG/DL — SIGNIFICANT CHANGE UP (ref 8–20)
CALCIUM SERPL-MCNC: 8.1 MG/DL — LOW (ref 8.6–10.2)
CHLORIDE SERPL-SCNC: 102 MMOL/L — SIGNIFICANT CHANGE UP (ref 98–107)
CO2 SERPL-SCNC: 21 MMOL/L — LOW (ref 22–29)
CREAT SERPL-MCNC: 0.91 MG/DL — SIGNIFICANT CHANGE UP (ref 0.5–1.3)
GLUCOSE BLDC GLUCOMTR-MCNC: 126 MG/DL — HIGH (ref 70–99)
GLUCOSE BLDC GLUCOMTR-MCNC: 152 MG/DL — HIGH (ref 70–99)
GLUCOSE BLDC GLUCOMTR-MCNC: 163 MG/DL — HIGH (ref 70–99)
GLUCOSE BLDC GLUCOMTR-MCNC: 167 MG/DL — HIGH (ref 70–99)
GLUCOSE BLDC GLUCOMTR-MCNC: 179 MG/DL — HIGH (ref 70–99)
GLUCOSE BLDC GLUCOMTR-MCNC: 210 MG/DL — HIGH (ref 70–99)
GLUCOSE SERPL-MCNC: 196 MG/DL — HIGH (ref 70–115)
HCT VFR BLD CALC: 30.3 % — LOW (ref 39–50)
HGB BLD-MCNC: 9.8 G/DL — LOW (ref 13–17)
MAGNESIUM SERPL-MCNC: 2.1 MG/DL — SIGNIFICANT CHANGE UP (ref 1.6–2.6)
MCHC RBC-ENTMCNC: 27.4 PG — SIGNIFICANT CHANGE UP (ref 27–34)
MCHC RBC-ENTMCNC: 32.3 GM/DL — SIGNIFICANT CHANGE UP (ref 32–36)
MCV RBC AUTO: 84.6 FL — SIGNIFICANT CHANGE UP (ref 80–100)
PLATELET # BLD AUTO: 114 K/UL — LOW (ref 150–400)
POTASSIUM SERPL-MCNC: 4 MMOL/L — SIGNIFICANT CHANGE UP (ref 3.5–5.3)
POTASSIUM SERPL-SCNC: 4 MMOL/L — SIGNIFICANT CHANGE UP (ref 3.5–5.3)
RBC # BLD: 3.58 M/UL — LOW (ref 4.2–5.8)
RBC # FLD: 15.1 % — HIGH (ref 10.3–14.5)
SODIUM SERPL-SCNC: 136 MMOL/L — SIGNIFICANT CHANGE UP (ref 135–145)
WBC # BLD: 11.46 K/UL — HIGH (ref 3.8–10.5)
WBC # FLD AUTO: 11.46 K/UL — HIGH (ref 3.8–10.5)

## 2019-12-06 PROCEDURE — 71045 X-RAY EXAM CHEST 1 VIEW: CPT | Mod: 26

## 2019-12-06 RX ORDER — OXYCODONE AND ACETAMINOPHEN 5; 325 MG/1; MG/1
2 TABLET ORAL EVERY 4 HOURS
Refills: 0 | Status: DISCONTINUED | OUTPATIENT
Start: 2019-12-06 | End: 2019-12-08

## 2019-12-06 RX ORDER — POTASSIUM CHLORIDE 20 MEQ
40 PACKET (EA) ORAL ONCE
Refills: 0 | Status: COMPLETED | OUTPATIENT
Start: 2019-12-06 | End: 2019-12-06

## 2019-12-06 RX ORDER — INSULIN GLARGINE 100 [IU]/ML
20 INJECTION, SOLUTION SUBCUTANEOUS AT BEDTIME
Refills: 0 | Status: DISCONTINUED | OUTPATIENT
Start: 2019-12-06 | End: 2019-12-09

## 2019-12-06 RX ORDER — OXYCODONE AND ACETAMINOPHEN 5; 325 MG/1; MG/1
1 TABLET ORAL EVERY 4 HOURS
Refills: 0 | Status: DISCONTINUED | OUTPATIENT
Start: 2019-12-06 | End: 2019-12-09

## 2019-12-06 RX ADMIN — Medication 2: at 06:32

## 2019-12-06 RX ADMIN — SIMVASTATIN 20 MILLIGRAM(S): 20 TABLET, FILM COATED ORAL at 22:40

## 2019-12-06 RX ADMIN — Medication 4 UNIT(S): at 11:42

## 2019-12-06 RX ADMIN — Medication 4 UNIT(S): at 18:57

## 2019-12-06 RX ADMIN — Medication 2: at 22:42

## 2019-12-06 RX ADMIN — PANTOPRAZOLE SODIUM 40 MILLIGRAM(S): 20 TABLET, DELAYED RELEASE ORAL at 11:43

## 2019-12-06 RX ADMIN — Medication 4: at 11:40

## 2019-12-06 RX ADMIN — ENOXAPARIN SODIUM 40 MILLIGRAM(S): 100 INJECTION SUBCUTANEOUS at 11:41

## 2019-12-06 RX ADMIN — Medication 50 MILLIGRAM(S): at 08:12

## 2019-12-06 RX ADMIN — Medication 4 UNIT(S): at 06:32

## 2019-12-06 RX ADMIN — SENNA PLUS 2 TABLET(S): 8.6 TABLET ORAL at 22:40

## 2019-12-06 RX ADMIN — CLOPIDOGREL BISULFATE 75 MILLIGRAM(S): 75 TABLET, FILM COATED ORAL at 11:41

## 2019-12-06 RX ADMIN — POLYETHYLENE GLYCOL 3350 17 GRAM(S): 17 POWDER, FOR SOLUTION ORAL at 11:43

## 2019-12-06 RX ADMIN — Medication 325 MILLIGRAM(S): at 11:41

## 2019-12-06 RX ADMIN — INSULIN GLARGINE 20 UNIT(S): 100 INJECTION, SOLUTION SUBCUTANEOUS at 22:44

## 2019-12-06 RX ADMIN — Medication 40 MILLIEQUIVALENT(S): at 06:31

## 2019-12-06 NOTE — PROGRESS NOTE ADULT - PROBLEM SELECTOR PLAN 1
neurologically intact  hemodynamically stable  continue lopressor, up titrate as needed and tolerated  PW connected to EPM, will need to be cut prior to d/c  continue aspirin and plavix for acute graft closure prophylaxis  continue statin for chronic graft patency prophylaxis  oxygenating well, coughing and deep breathing exercises/incentive spirometry instructed and strongly encouraged  lasix PRN, replete electrolytes PRN  continue with PT, increase activity as tolerated  Percocet PRN for analgesia  stable for transfer to floor  above plan of care to be discussed with CT surgical team on am rounds

## 2019-12-06 NOTE — PROGRESS NOTE ADULT - PROBLEM SELECTOR PLAN 4
insulin weaned off per protocol  continue lantus and premeal humalog with FS AC+HS with coverage for glycemic control  Endocrine consulted/following

## 2019-12-06 NOTE — PROGRESS NOTE ADULT - ASSESSMENT
69 year old male PMH CAD, s/p stents x4 (last cath 2013), GERD, HLD, DM (A1c 10.3), osteoporosis, fatty liver. Pt p/w CP and DUBON to [facility] + NSTEMI, [facility] with multi-vessel CAD. Tx to [facility] for CABG.   12/3/19 s/p CABG x 3. Immediate post-op course notable for acute blood loss anemia (now improved) and coagulopathy (now resolved).

## 2019-12-06 NOTE — PROGRESS NOTE ADULT - SUBJECTIVE AND OBJECTIVE BOX
POD #3, s/p CABG x 3       Subjective: no c/o at this time. Reports no BM since before surgery. Mild incisional pain, improves with analgesia. Denies CP, SOB, palpitations, N/V, other c/o.      T(C): 36.9 (12-05-19 @ 20:27), Max: 37.1 (12-05-19 @ 08:00)  HR: 105 (12-06-19 @ 00:00) (80 - 117), ST  BP: 116/64 (12-06-19 @ 00:00) (87/51 - 126/68)  ABP: 101/64 (12-05-19 @ 17:00) (83/49 - 137/61)  ABP(mean): 80 (12-05-19 @ 17:00) (58 - 84)  RR: 20 (12-06-19 @ 00:00) (19 - 28)  SpO2: 93% (12-06-19 @ 00:00) (92% - 100%) on room air  CVP(mm Hg): 14 (12-05-19 @ 17:00) (3 - 14)      I&O's Detail    04 Dec 2019 07:01  -  05 Dec 2019 07:00  --------------------------------------------------------  Total IN: 2074 mL  Total OUT: 2265 mL  Total NET: -191 mL    05 Dec 2019 07:01  -  06 Dec 2019 00:36  --------------------------------------------------------  IN:    insulin regular Infusion: 34 mL    Oral Fluid: 680 mL    Packed Red Blood Cells: 350 mL  Total IN: 1064 mL    OUT:    Indwelling Catheter - Urethral: 705 mL    Voided: 300 mL  Total OUT: 1005 mL    Total NET: 59 mL      LABS: All Lab data reviewed and analyzed                        9.0    11.89 )-----------( 119      ( 05 Dec 2019 08:17 )             28.3     12-05    138  |  102  |  14.0  ----------------------------<  125<H>  3.6   |  23.0  |  0.84    Ca    7.9<L>      05 Dec 2019 03:02  Mg     2.2     12-05    POCT Blood Glucose.: 173 mg/dL (05 Dec 2019 23:49)  POCT Blood Glucose.: 201 mg/dL (05 Dec 2019 21:25)  POCT Blood Glucose.: 187 mg/dL (05 Dec 2019 20:21)  POCT Blood Glucose.: 193 mg/dL (05 Dec 2019 17:54)  POCT Blood Glucose.: 165 mg/dL (05 Dec 2019 16:38)  POCT Blood Glucose.: 153 mg/dL (05 Dec 2019 15:59)  POCT Blood Glucose.: 177 mg/dL (05 Dec 2019 14:22)  POCT Blood Glucose.: 165 mg/dL (05 Dec 2019 11:24)  POCT Blood Glucose.: 126 mg/dL (05 Dec 2019 10:08)  POCT Blood Glucose.: 135 mg/dL (05 Dec 2019 07:48)  POCT Blood Glucose.: 152 mg/dL (05 Dec 2019 06:52)  POCT Blood Glucose.: 154 mg/dL (05 Dec 2019 04:54)  POCT Blood Glucose.: 145 mg/dL (05 Dec 2019 04:03)  POCT Blood Glucose.: 134 mg/dL (05 Dec 2019 03:05)  POCT Blood Glucose.: 84 mg/dL (05 Dec 2019 02:12)  POCT Blood Glucose.: 118 mg/dL (05 Dec 2019 01:08)           RADIOLOGY: - Reviewed and analyzed   < from: Xray Chest 1 View- PORTABLE-Routine (12.05.19 @ 05:59) >  FINDINGS:   12/4/2019 available for review.   [A right IJ catheter sheath tip assembly in SVC.]   The lungs  are clear.  No pleural abnormality is seen.  The heart and mediastinum are within normal limits following cardiac surgery.   Visualized osseous structures are intact.  IMPRESSION:    Status post open heart surgery.   No evidence of active chest pathology.   Catheters in place.  < end of copied text >    12/6/19 CXR: pending      HOSPITAL MEDICATIONS: All medications reviewed and analyzed  MEDICATIONS  (STANDING):  aspirin enteric coated 325 milliGRAM(s) Oral daily  clopidogrel Tablet 75 milliGRAM(s) Oral daily  enoxaparin Injectable 40 milliGRAM(s) SubCutaneous daily  insulin glargine Injectable (LANTUS) 16 Unit(s) SubCutaneous at bedtime  insulin lispro (HumaLOG) corrective regimen sliding scale   SubCutaneous Before meals and at bedtime  insulin lispro Injectable (HumaLOG) 4 Unit(s) SubCutaneous three times a day before meals  metoprolol tartrate 50 milliGRAM(s) Oral every 12 hours  pantoprazole    Tablet 40 milliGRAM(s) Oral daily  polyethylene glycol 3350 17 Gram(s) Oral daily  senna 2 Tablet(s) Oral at bedtime  simvastatin 20 milliGRAM(s) Oral at bedtime    MEDICATIONS  (PRN):  bisacodyl 5 milliGRAM(s) Oral every 12 hours PRN Constipation  dextrose 40% Gel 15 Gram(s) Oral once PRN Blood Glucose LESS THAN 70 milliGRAM(s)/deciLiter      Physical Exam  Neuro: A+O x 3, non-focal, speech clear and intact  HEENT:  NCAT, PERRL, EOMI. No conjuctival edema or icterus, no thrush.  No ETT or NGT/OGT  Neck:  supple, trachea midline, no tracheostomy  Pulm: CTA with diminished bases, equal bilaterally, no rales/rhonchi/wheezing, no accessory muscle use noted  Chest: +PW (settings: VVI, rate: 50, mA: 10, threshold: 2.0, sensitivity: 2.0)  CV: tachycardic, regular rhythm, +S1S2, no murmur or rub noted  Abd: soft, NT, ND, + BS  Ext: MACK x 4, trace edema, no cyanosis or clubbing, distal motor/neuro/circ intact  Skin: warm, dry, well perfused  Incisions: midsternal C/D/I/stable w/ dressing, RLE C/D/I w/ dressing and Ace wrap

## 2019-12-07 ENCOUNTER — TRANSCRIPTION ENCOUNTER (OUTPATIENT)
Age: 69
End: 2019-12-07

## 2019-12-07 LAB
ANION GAP SERPL CALC-SCNC: 14 MMOL/L — SIGNIFICANT CHANGE UP (ref 5–17)
BUN SERPL-MCNC: 17 MG/DL — SIGNIFICANT CHANGE UP (ref 8–20)
CALCIUM SERPL-MCNC: 8.1 MG/DL — LOW (ref 8.6–10.2)
CHLORIDE SERPL-SCNC: 104 MMOL/L — SIGNIFICANT CHANGE UP (ref 98–107)
CO2 SERPL-SCNC: 20 MMOL/L — LOW (ref 22–29)
CREAT SERPL-MCNC: 0.84 MG/DL — SIGNIFICANT CHANGE UP (ref 0.5–1.3)
GLUCOSE BLDC GLUCOMTR-MCNC: 143 MG/DL — HIGH (ref 70–99)
GLUCOSE BLDC GLUCOMTR-MCNC: 160 MG/DL — HIGH (ref 70–99)
GLUCOSE BLDC GLUCOMTR-MCNC: 168 MG/DL — HIGH (ref 70–99)
GLUCOSE BLDC GLUCOMTR-MCNC: 172 MG/DL — HIGH (ref 70–99)
GLUCOSE BLDC GLUCOMTR-MCNC: 246 MG/DL — HIGH (ref 70–99)
GLUCOSE SERPL-MCNC: 181 MG/DL — HIGH (ref 70–115)
HCT VFR BLD CALC: 31.6 % — LOW (ref 39–50)
HGB BLD-MCNC: 10 G/DL — LOW (ref 13–17)
MAGNESIUM SERPL-MCNC: 2 MG/DL — SIGNIFICANT CHANGE UP (ref 1.6–2.6)
MCHC RBC-ENTMCNC: 27.3 PG — SIGNIFICANT CHANGE UP (ref 27–34)
MCHC RBC-ENTMCNC: 31.6 GM/DL — LOW (ref 32–36)
MCV RBC AUTO: 86.3 FL — SIGNIFICANT CHANGE UP (ref 80–100)
PLATELET # BLD AUTO: 148 K/UL — LOW (ref 150–400)
POTASSIUM SERPL-MCNC: 3.8 MMOL/L — SIGNIFICANT CHANGE UP (ref 3.5–5.3)
POTASSIUM SERPL-SCNC: 3.8 MMOL/L — SIGNIFICANT CHANGE UP (ref 3.5–5.3)
RBC # BLD: 3.66 M/UL — LOW (ref 4.2–5.8)
RBC # FLD: 15.3 % — HIGH (ref 10.3–14.5)
SODIUM SERPL-SCNC: 138 MMOL/L — SIGNIFICANT CHANGE UP (ref 135–145)
WBC # BLD: 9.3 K/UL — SIGNIFICANT CHANGE UP (ref 3.8–10.5)
WBC # FLD AUTO: 9.3 K/UL — SIGNIFICANT CHANGE UP (ref 3.8–10.5)

## 2019-12-07 PROCEDURE — 71045 X-RAY EXAM CHEST 1 VIEW: CPT | Mod: 26

## 2019-12-07 RX ORDER — ALBUMIN HUMAN 25 %
250 VIAL (ML) INTRAVENOUS ONCE
Refills: 0 | Status: DISCONTINUED | OUTPATIENT
Start: 2019-12-07 | End: 2019-12-07

## 2019-12-07 RX ORDER — ASPIRIN/CALCIUM CARB/MAGNESIUM 324 MG
2 TABLET ORAL
Qty: 0 | Refills: 0 | DISCHARGE

## 2019-12-07 RX ORDER — PANTOPRAZOLE SODIUM 20 MG/1
1 TABLET, DELAYED RELEASE ORAL
Qty: 0 | Refills: 0 | DISCHARGE

## 2019-12-07 RX ORDER — POTASSIUM CHLORIDE 20 MEQ
40 PACKET (EA) ORAL ONCE
Refills: 0 | Status: COMPLETED | OUTPATIENT
Start: 2019-12-07 | End: 2019-12-07

## 2019-12-07 RX ORDER — SODIUM CHLORIDE 9 MG/ML
250 INJECTION, SOLUTION INTRAVENOUS ONCE
Refills: 0 | Status: COMPLETED | OUTPATIENT
Start: 2019-12-07 | End: 2019-12-07

## 2019-12-07 RX ORDER — FUROSEMIDE 40 MG
40 TABLET ORAL DAILY
Refills: 0 | Status: DISCONTINUED | OUTPATIENT
Start: 2019-12-07 | End: 2019-12-09

## 2019-12-07 RX ORDER — INSULIN LISPRO 100/ML
4 VIAL (ML) SUBCUTANEOUS
Qty: 100 | Refills: 0
Start: 2019-12-07

## 2019-12-07 RX ORDER — SIMVASTATIN 20 MG/1
1 TABLET, FILM COATED ORAL
Qty: 0 | Refills: 0 | DISCHARGE

## 2019-12-07 RX ORDER — METOPROLOL TARTRATE 50 MG
1 TABLET ORAL
Qty: 0 | Refills: 0 | DISCHARGE

## 2019-12-07 RX ORDER — POTASSIUM CHLORIDE 20 MEQ
20 PACKET (EA) ORAL DAILY
Refills: 0 | Status: DISCONTINUED | OUTPATIENT
Start: 2019-12-08 | End: 2019-12-09

## 2019-12-07 RX ORDER — ENOXAPARIN SODIUM 100 MG/ML
20 INJECTION SUBCUTANEOUS
Qty: 100 | Refills: 0
Start: 2019-12-07

## 2019-12-07 RX ORDER — ATENOLOL 25 MG/1
50 TABLET ORAL
Refills: 0 | Status: DISCONTINUED | OUTPATIENT
Start: 2019-12-07 | End: 2019-12-08

## 2019-12-07 RX ADMIN — OXYCODONE AND ACETAMINOPHEN 1 TABLET(S): 5; 325 TABLET ORAL at 09:17

## 2019-12-07 RX ADMIN — Medication 4 UNIT(S): at 08:25

## 2019-12-07 RX ADMIN — SODIUM CHLORIDE 500 MILLILITER(S): 9 INJECTION, SOLUTION INTRAVENOUS at 23:19

## 2019-12-07 RX ADMIN — SENNA PLUS 2 TABLET(S): 8.6 TABLET ORAL at 21:36

## 2019-12-07 RX ADMIN — POLYETHYLENE GLYCOL 3350 17 GRAM(S): 17 POWDER, FOR SOLUTION ORAL at 08:24

## 2019-12-07 RX ADMIN — Medication 50 MILLIGRAM(S): at 05:46

## 2019-12-07 RX ADMIN — Medication 325 MILLIGRAM(S): at 08:25

## 2019-12-07 RX ADMIN — Medication 2: at 08:25

## 2019-12-07 RX ADMIN — Medication 4 UNIT(S): at 12:10

## 2019-12-07 RX ADMIN — Medication 40 MILLIGRAM(S): at 15:53

## 2019-12-07 RX ADMIN — ENOXAPARIN SODIUM 40 MILLIGRAM(S): 100 INJECTION SUBCUTANEOUS at 21:36

## 2019-12-07 RX ADMIN — INSULIN GLARGINE 20 UNIT(S): 100 INJECTION, SOLUTION SUBCUTANEOUS at 21:36

## 2019-12-07 RX ADMIN — SIMVASTATIN 20 MILLIGRAM(S): 20 TABLET, FILM COATED ORAL at 21:37

## 2019-12-07 RX ADMIN — PANTOPRAZOLE SODIUM 40 MILLIGRAM(S): 20 TABLET, DELAYED RELEASE ORAL at 08:25

## 2019-12-07 RX ADMIN — Medication 2: at 17:32

## 2019-12-07 RX ADMIN — Medication 2: at 21:36

## 2019-12-07 RX ADMIN — OXYCODONE AND ACETAMINOPHEN 2 TABLET(S): 5; 325 TABLET ORAL at 22:15

## 2019-12-07 RX ADMIN — OXYCODONE AND ACETAMINOPHEN 1 TABLET(S): 5; 325 TABLET ORAL at 08:31

## 2019-12-07 RX ADMIN — Medication 40 MILLIEQUIVALENT(S): at 15:53

## 2019-12-07 RX ADMIN — CLOPIDOGREL BISULFATE 75 MILLIGRAM(S): 75 TABLET, FILM COATED ORAL at 08:26

## 2019-12-07 RX ADMIN — ATENOLOL 50 MILLIGRAM(S): 25 TABLET ORAL at 17:37

## 2019-12-07 RX ADMIN — Medication 4 UNIT(S): at 17:32

## 2019-12-07 RX ADMIN — Medication 4: at 12:10

## 2019-12-07 RX ADMIN — OXYCODONE AND ACETAMINOPHEN 2 TABLET(S): 5; 325 TABLET ORAL at 21:38

## 2019-12-07 NOTE — DISCHARGE NOTE PROVIDER - HOSPITAL COURSE
69 year old male PMH CAD, s/p stents x4 ('03, '06, '12), GERD, HLD, DM (A1c 10.3), osteoporosis, fatty liver, originally presented to [facility] with complaint of chest pain and dyspnea on exertion, found to have a +NSTEMI with Left heart cath showing multi-vessel CAD. Patient was transferred to [facility] on 11/29/19 for evaluation for CABG. Patient underwent CABG X 4 with right saphenous vein harvest on 12/3/19. Immediate post-op course notable for acute blood loss anemia (improved after multiple blood products) and coagulopathy (now resolved). Endocrine following patient to control blood sugars postoperatively. No rhythm issues noted postoperatively. Patient stable for discharge as per Dr. Pires.         Physical Examination: 69 year old male PMH CAD, s/p stents x4 ('03, '06, '12), GERD, HLD, DM (A1c 10.3), osteoporosis, fatty liver, originally presented to [facility] with complaint of chest pain and dyspnea on exertion, found to have a +NSTEMI with Left heart cath showing multi-vessel CAD. Patient was transferred to [facility] on 11/29/19 for evaluation for CABG. Patient underwent CABG X 4 with right saphenous vein harvest on 12/3/19. Immediate post-op course notable for acute blood loss anemia (improved after multiple blood products) and coagulopathy (now resolved). Endocrine following patient to control blood sugars postoperatively. No rhythm issues noted postoperatively. Patient stable for discharge as per Dr. Pires.

## 2019-12-07 NOTE — DISCHARGE NOTE PROVIDER - PROVIDER TOKENS
PROVIDER:[TOKEN:[[id number]]],PROVIDER:[[id number]]],PROVIDER:[TOKEN:[[id number]]] PROVIDER:[TOKEN:[[id number]],SCHEDULEDAPPT:[12/17/2019],SCHEDULEDAPPTTIME:[11:15 AM],ESTABLISHEDPATIENT:[T]],PROVIDER:[TOKEN:[[id number]]],PROVIDER:[TOKEN:[[id number]]]

## 2019-12-07 NOTE — DISCHARGE NOTE PROVIDER - NSDCCPCAREPLAN_GEN_ALL_CORE_FT
PRINCIPAL DISCHARGE DIAGNOSIS  Diagnosis: Coronary artery disease of native artery of native heart with stable angina pectoris  Assessment and Plan of Treatment: Coronary artery disease of native artery of native heart with stable angina pectoris PRINCIPAL DISCHARGE DIAGNOSIS  Diagnosis: Coronary artery disease of native artery of native heart with stable angina pectoris  Assessment and Plan of Treatment: 1. Take ALL of your medications as ordered. Fill your prescriptions the day you are discharged and take according to the schedule you were given. Continue to take a stool softener if you are taking narcotic pain medications. AVOID medications such as ibuprofen or naproxen if you have had bypass surgery. If you have any questions or are unable to fill the prescriptions, please call the office right away at [phone].  2. Shower daily. Clean all incisions daily while showering with warm water and mild soap, pat dry with a clean towel and do not cover with any dressings unless instructed to. No bathing, swimming in a pool or the ocean until instructed by MD.  DO NOT use creams or lotions on the wound.  3. We advise that you do not drive until instructed by MD.   4. You may not return to work until instructed by MD.   5. Please eat a low fat, low cholesterol, low salt diet. (No added/extra salt)  6. Weigh yourself every day in the morning and record it in the weight log in your red folder. Notify the office of any weight gain more than 2-3 pounds in 24 hours.  7. Continue breathing exercises several times a day. Continue to use your heart pillow.  8. No heavy lifting nothing greater than 5 pounds until cleared by MD.   9. Call / Notify MD any fever greater than 101.0, any drainage from incisions or if they become red, hot or very tender to the touch.  10. Increase activity as tolerated. Walk indoors and/or outdoors at least 3 times a day.        SECONDARY DISCHARGE DIAGNOSES  Diagnosis: Type 2 diabetes mellitus without complication, with long-term current use of insulin  Assessment and Plan of Treatment: It is extremely important to follow a diabetic (consistent carbohydrates, LOW/NO ADDED SUGAR) diet and monitor your glucose (sugar) levels. You have an increased risk of complications, including wound infections, due to the diabetes.  1. Check your glucoses 3-4 times daily before meals and bedtime.   2. Keep a log of your glucose levels every day.   3. Make an appointment to meet with your primary care provider or endocrinologist within a week.   4. Take ALL of your medications as prescribed. Only hold medications for low glucose levels (< 80) or if you are symptomatic (dizzy, sweating, lightheaded).  5. Ideally, we would like all of the glucose levels to be between .   You may have been discharged on different medications than you were taking before. Your body reacts differently to the medications after surgery. Please continue to take the medications as prescribed and notify the office, nurse practitioner or your PCP if you have any concerns right away.      Diagnosis: Essential hypertension  Assessment and Plan of Treatment: Take all medications as prescribed and listed on your discharge instructions.   Please follow up with your Cardiologist and Primary Care Physician 2-4 weeks from discharge.    Diagnosis: Other hyperlipidemia  Assessment and Plan of Treatment: Take all medications as prescribed and listed on your discharge instructions.

## 2019-12-07 NOTE — PROGRESS NOTE ADULT - ASSESSMENT
69 year old male PMH CAD, s/p stents x4 (last cath 2013), GERD, HLD, DM (A1c 10.3), osteoporosis, fatty liver. Pt p/w CP and DUBON to [facility]., + NSTEMI, [facility] with multi-vessel CAD. Tx to [facility] for CABG.   12/3/19 s/p CABG x 3. Immediate post-op course notable for acute blood loss anemia (now improved) and coagulopathy (now resolved).

## 2019-12-07 NOTE — DISCHARGE NOTE PROVIDER - INSTRUCTIONS
Healthy carbohydrates such as fruits, vegetables, whole grains, legumes (beans, peas and lentils) and low-fat dairy products. Fiber-rich foods such as vegetables, fruits, nuts, legumes, whole-wheat flour and wheat bran. Heart-healthy fish at least twice a week such as cod, tuna and halibut, which are low-fat options as well as salmon, mackerel, tuna, sardines and bluefish, which are rich in omega-3 fatty acids. Avoid fish with high levels of mercury. "Good" fats such as avocados, almonds, pecans, walnuts, olives and canola, olive and peanut oils.

## 2019-12-07 NOTE — DISCHARGE NOTE PROVIDER - CARE PROVIDER_API CALL
Kenneth Pires)  CTS Surgery  [address] [place] [address]  Phone: [phone]  Fax: [phone]  Follow Up Time:     Sharif Abreu)  Cardiovascular Disease  [address] [place] [address]  Phone: [phone]  Fax: [phone]  Follow Up Time:     Benjamin Holloway)  EndocrinologyMetabDiabetes; Internal Medicine  [address] [place] [address]  Phone: [phone]  Fax: [phone]  Follow Up Time: Kenneth Pires)  CTS Surgery  [address] [place] [address]  Phone: [phone]  Fax: [phone]  Established Patient  Scheduled Appointment: 12/17/2019 11:15 AM    Sharif Abreu)  Cardiovascular Disease  [address] [place] [address]  Phone: [phone]  Fax: [phone]  Follow Up Time:     Benjamin Holloway)  EndocrinologyMetabDiabetes; Internal Medicine  [address] [place] [address]  Phone: [phone]  Fax: [phone]  Follow Up Time:

## 2019-12-07 NOTE — PROGRESS NOTE ADULT - PROBLEM SELECTOR PLAN 1
neurologically intact  hemodynamically stable  continue lopressor, up titrate as needed and tolerated  continue aspirin and plavix for acute graft closure prophylaxis  continue statin for chronic graft patency prophylaxis  oxygenating well, coughing and deep breathing exercises/incentive spirometry instructed and strongly encouraged  lasix PRN, replete electrolytes PRN  continue with PT, increase activity as tolerated  Percocet PRN for analgesia  start Lasix PO  above plan of care to be discussed with CT surgical team on am rounds

## 2019-12-07 NOTE — DISCHARGE NOTE PROVIDER - NSDCPNSUBOBJ_GEN_ALL_CORE
Subjective:        VITAL SIGNS    Vital Signs Last 24 Hrs    T(C): 36.7 (19 @ 05:09), Max: 36.8 (19 @ 10:00)    T(F): 98.1 (19 @ 05:09), Max: 98.3 (19 @ 10:00)    HR: 94 (19 @ 05:09) (82 - 94)    BP: 109/67 (19 @ 05:09) (94/61 - 128/69)    RR: 18 (19 @ 05:09) (18 - 18)    SpO2: 95% (19 @ 05:09) (93% - 95%)  on (O2)                  Telemetry:      LVEF:         MEDICATIONS    acetaminophen   Tablet .. 650 milliGRAM(s) Oral every 6 hours PRN    aspirin enteric coated 325 milliGRAM(s) Oral daily    bisacodyl 5 milliGRAM(s) Oral every 12 hours PRN    clopidogrel Tablet 75 milliGRAM(s) Oral daily    dextrose 40% Gel 15 Gram(s) Oral once PRN    dextrose 50% Injectable 50 milliLiter(s) IV Push every 15 minutes    dextrose 50% Injectable 25 milliLiter(s) IV Push every 15 minutes    enoxaparin Injectable 40 milliGRAM(s) SubCutaneous daily    furosemide    Tablet 40 milliGRAM(s) Oral daily    insulin glargine Injectable (LANTUS) 20 Unit(s) SubCutaneous at bedtime    insulin lispro (HumaLOG) corrective regimen sliding scale   SubCutaneous Before meals and at bedtime    insulin lispro Injectable (HumaLOG) 4 Unit(s) SubCutaneous three times a day before meals    metoprolol tartrate 25 milliGRAM(s) Oral two times a day    oxycodone    5 mG/acetaminophen 325 mG 1 Tablet(s) Oral every 4 hours PRN    pantoprazole    Tablet 40 milliGRAM(s) Oral daily    polyethylene glycol 3350 17 Gram(s) Oral daily    potassium chloride    Tablet ER 20 milliEquivalent(s) Oral daily    potassium chloride   Powder 40 milliEquivalent(s) Oral once    senna 2 Tablet(s) Oral at bedtime    simvastatin 20 milliGRAM(s) Oral at bedtime            PHYSICAL EXAM    General: well nourished, well developed, no acute distress    Neurology: alert and oriented x 3, nonfocal, no gross deficits    Respiratory: clear to auscultation bilaterally    CV: regular rate and rhythm, normal S1, S2    Abdomen: soft, nontender, nondistended, positive bowel sounds, last bowel movement     Extremities: warm, well perfused. no edema. + DP pulses    Incisions: midline sternal incision, + mepilex, c/d/i. sternum stable.    Chest tubes:     Epicardial Wires:    > EPM (settings) / isolated        I&O's Detail        08 Dec 2019 07:01  -  09 Dec 2019 07:00    --------------------------------------------------------    IN:      Oral Fluid: 390 mL    Total IN: 390 mL        OUT:    Total OUT: 0 mL        Total NET: 390 mL                    Weights:    Daily       Daily Weight in k.6 (09 Dec 2019 06:00)    Admit Wt: Drug Dosing Weight    Height (cm): 175.26 (03 Dec 2019 06:27)    Weight (kg): 96.2 (03 Dec 2019 06:27)    BMI (kg/m2): 31.3 (03 Dec 2019 06:27)    BSA (m2): 2.12 (03 Dec 2019 06:27)        LABS            137  |  101  |  16.0    ----------------------------<  148<H>    3.8   |  20.0<L>  |  0.90        Ca    8.5<L>      09 Dec 2019 06:13    Phos  2.9         Mg     2.1             TPro  6.6  /  Alb  3.3  /  TBili  1.2  /  DBili  x   /  AST  24  /  ALT  21  /  AlkPhos  98                                         10.2     8.48  )-----------( 203      ( 09 Dec 2019 06:13 )               33.3                        Bilirubin Total, Serum: 1.2 mg/dL ( @ 06:13)        CAPILLARY BLOOD GLUCOSE            POCT Blood Glucose.: 185 mg/dL (09 Dec 2019 08:02)    POCT Blood Glucose.: 162 mg/dL (08 Dec 2019 22:36)    POCT Blood Glucose.: 120 mg/dL (08 Dec 2019 21:09)    POCT Blood Glucose.: 131 mg/dL (08 Dec 2019 17:06)    POCT Blood Glucose.: 138 mg/dL (08 Dec 2019 12:15)                 Today's CXR:        Today's EKG:        PAST MEDICAL & SURGICAL HISTORY:    Osteoporosis    DJD (degenerative joint disease)    CAD (coronary artery disease)    HTN (hypertension)    GERD (gastroesophageal reflux disease)    Hyperlipidemia    Hepatitis B    Heartburn    Diabetes 1.5, managed as type 2    Arteriosclerosis    Status post cataract extraction: left eye done on 10/2/2019    History of cardiac catheterization: with 4 stents ,,             Assessment and Plan:     · Assessment        69 year old male PMH CAD, s/p stents x4 (last cath ), GERD, HLD, DM (A1c 10.3), osteoporosis, fatty liver. Pt p/w CP and DUBON to [facility] + NSTEMI, [facility] with multi-vessel CAD. Tx to [facility] for CABG.     12/3/19 s/p CABG x 3. Immediate post-op course notable for acute blood loss anemia (now improved) and coagulopathy (now resolved).     post op hyperglycemia Endocrine following         Hypotensive betablocker reduced, will continue to monitor        Problem/Plan - 1:    ·  Problem: Coronary artery disease of native artery of native heart with stable angina pectoris.  Plan: changed betablocker with reduced dose    continue aspirin and plavix for acute graft closure prophylaxis    continue statin for chronic graft patency prophylaxis    oxygenating well, coughing and deep breathing exercises/incentive spirometry instructed and strongly encouraged    lasix daily as per Dr Pires,     continue with PT, increase activity as tolerated    Percocet PRN for analgesia.         Problem/Plan - 2:    ·  Problem: Other hyperlipidemia.  Plan: Continue Statin.         Problem/Plan - 3:    ·  Problem: Type 2 diabetes mellitus without complication, with long-term current use of insulin.  Plan: insulin weaned off per protocol    continue lantus and premeal humalog with FS AC+HS with coverage for glycemic control    Endocrine consulted/following.     Problem/Plan - 4:    ·  Problem: Gastroesophageal reflux disease without esophagitis.  Plan: Continue Protonix.         Problem/Plan - 5:    ·  Problem: Prophylactic measure.  Plan: Lovenox and SCDs for DVT prophylaxis    Protonix for GI prophylaxis.         Problem/Plan - 6:    Problem: Hypotension. Plan: reduce betablocker    Echo R/O effusion. Subjective: " I feel good, everybody has been so nice and attentive."  Pt. OOB to chair, denies any SOB or chest pain, daughter at the bedside.        VITAL SIGNS    Vital Signs Last 24 Hrs    T(C): 36.7 (19 @ 05:09), Max: 36.8 (19 @ 10:00)    T(F): 98.1 (19 @ 05:09), Max: 98.3 (19 @ 10:00)    HR: 94 (19 @ 05:09) (82 - 94)    BP: 109/67 (19 @ 05:09) (94/61 - 128/69)    RR: 18 (19 @ 05:09) (18 - 18)    SpO2: 95% (19 @ 05:09) (93% - 95%)  on (O2)                  Telemetry:  Sinus rhythm    LVEF: 60%        MEDICATIONS    acetaminophen   Tablet .. 650 milliGRAM(s) Oral every 6 hours PRN    aspirin enteric coated 325 milliGRAM(s) Oral daily    bisacodyl 5 milliGRAM(s) Oral every 12 hours PRN    clopidogrel Tablet 75 milliGRAM(s) Oral daily    dextrose 40% Gel 15 Gram(s) Oral once PRN    dextrose 50% Injectable 50 milliLiter(s) IV Push every 15 minutes    dextrose 50% Injectable 25 milliLiter(s) IV Push every 15 minutes    enoxaparin Injectable 40 milliGRAM(s) SubCutaneous daily    furosemide    Tablet 40 milliGRAM(s) Oral daily    insulin glargine Injectable (LANTUS) 20 Unit(s) SubCutaneous at bedtime    insulin lispro (HumaLOG) corrective regimen sliding scale   SubCutaneous Before meals and at bedtime    insulin lispro Injectable (HumaLOG) 4 Unit(s) SubCutaneous three times a day before meals    metoprolol tartrate 25 milliGRAM(s) Oral two times a day    oxycodone    5 mG/acetaminophen 325 mG 1 Tablet(s) Oral every 4 hours PRN    pantoprazole    Tablet 40 milliGRAM(s) Oral daily    polyethylene glycol 3350 17 Gram(s) Oral daily    potassium chloride    Tablet ER 20 milliEquivalent(s) Oral daily    potassium chloride   Powder 40 milliEquivalent(s) Oral once    senna 2 Tablet(s) Oral at bedtime    simvastatin 20 milliGRAM(s) Oral at bedtime            PHYSICAL EXAM    General: well nourished, well developed, no acute distress    Neurology: alert and oriented x 3, nonfocal, no gross deficits    Respiratory: clear to auscultation bilaterally    CV: regular rate and rhythm, normal S1, S2    Abdomen: soft, nontender, nondistended, positive bowel sounds, last bowel movement 19    Extremities: warm, well perfused. +edema RLE>LLE. + DP pulses    Incisions: midline sternal incision & RLE with + mepilex, c/d/i. sternum stable. +CT sutures RSVG incision +mepilex dressing            I&O's Detail        08 Dec 2019 07:01  -  09 Dec 2019 07:00    --------------------------------------------------------    IN:      Oral Fluid: 390 mL    Total IN: 390 mL        OUT:    Total OUT: 0 mL        Total NET: 390 mL                    Weights:    Daily       Daily Weight in k.6 (09 Dec 2019 06:00)    Admit Wt: Drug Dosing Weight    Height (cm): 175.26 (03 Dec 2019 06:27)    Weight (kg): 96.2 (03 Dec 2019 06:27)    BMI (kg/m2): 31.3 (03 Dec 2019 06:27)    BSA (m2): 2.12 (03 Dec 2019 06:27)        LABS            137  |  101  |  16.0    ----------------------------<  148<H>    3.8   |  20.0<L>  |  0.90        Ca    8.5<L>      09 Dec 2019 06:13    Phos  2.9         Mg     2.1             TPro  6.6  /  Alb  3.3  /  TBili  1.2  /  DBili  x   /  AST  24  /  ALT  21  /  AlkPhos  98                                         10.2     8.48  )-----------( 203      ( 09 Dec 2019 06:13 )               33.3                        Bilirubin Total, Serum: 1.2 mg/dL ( @ 06:13)        CAPILLARY BLOOD GLUCOSE            POCT Blood Glucose.: 185 mg/dL (09 Dec 2019 08:02)    POCT Blood Glucose.: 162 mg/dL (08 Dec 2019 22:36)    POCT Blood Glucose.: 120 mg/dL (08 Dec 2019 21:09)    POCT Blood Glucose.: 131 mg/dL (08 Dec 2019 17:06)    POCT Blood Glucose.: 138 mg/dL (08 Dec 2019 12:15)                 Today's CXR:< from: Xray Chest 1 View- PORTABLE-Routine (19 @ 05:41) >         EXAM:  XR CHEST PORTABLE ROUTINE 1V                              PROCEDURE DATE:  2019                  INTERPRETATION:  Follow-up.        AP chest. Prior 2019.        Low lung volumes. No change heart mediastinum. Slight interval     improvement in the leftbasilar atelectasis/airspace disease. No     significant change in small left pleural effusion. No new abnormality.        Impression: As above            BECK WARD M.D., ATTENDING RADIOLOGIST    This document has been electronically signed. Dec  [id number]  9:52AM        < end of copied text >            PAST MEDICAL & SURGICAL HISTORY:    Osteoporosis    DJD (degenerative joint disease)    CAD (coronary artery disease)    HTN (hypertension)    GERD (gastroesophageal reflux disease)    Hyperlipidemia    Hepatitis B    Heartburn    Diabetes 1.5, managed as type 2    Arteriosclerosis    Status post cataract extraction: left eye done on 10/2/2019    History of cardiac catheterization: with 4 stents ,,             Assessment and Plan:     · Assessment        69 year old male PMH CAD, s/p stents x4 (last cath ), GERD, HLD, DM (A1c 10.3), osteoporosis, fatty liver. Pt p/w CP and DUBON to [facility]., + NSTEMI, [facility] with multi-vessel CAD. Tx to [facility] for CABG.     12/3/19 s/p CABG x 3. Immediate post-op course notable for acute blood loss anemia (now improved) and coagulopathy (now resolved).     post op hyperglycemia Endocrine following         Hypotensive betablocker reduced, will continue to monitor        Problem/Plan - 1:    ·  Problem: Coronary artery disease of native artery of native heart with stable angina pectoris.      Plan:     Continue Percocet PRN for analgesia.     Continue Lopressor     continue aspirin and plavix for acute graft closure prophylaxis    continue Zocor for chronic graft patency prophylaxis    oxygenating well, coughing and deep breathing exercises/incentive spirometry instructed and strongly encouraged    Continue with PT, increase activity as tolerated    Continue lasix daily as per Dr Pires    Daily weights     D/C mepilex dressing & CT suture    Discharge to home    Discussed plan with Dr. Pires & CTS in morning rounds.            Problem/Plan - 2:    ·  Problem: Hyperlipidemia.      Plan: Continue Zocor.         Problem/Plan - 3:    ·  Problem: Type 2 diabetes mellitus without complication, with long-term current use of insulin.      Plan: Continue lantus and premeal humalog with FS AC+HS with coverage for glycemic control    Endocrine consulted/following.         Problem/Plan - 4:    ·  Problem: Gastroesophageal reflux disease without esophagitis.      Plan: Continue Protonix for GI prophylaxis..         Problem/Plan - 5:    ·  Problem: Prophylactic measure.      Plan: Continue Lovenox and SCDs for DVT prophylaxis            Problem/Plan - 6:    Problem: Hypotension.     Plan: Blood pressure improved    Continue Lopressor 50mg BID    Echo completed, trivial effusion.        Problem/Plan - 7:    Problem: Hypokalemia    Plan: Serum Potassium replaced

## 2019-12-07 NOTE — DISCHARGE NOTE PROVIDER - NSDCMRMEDTOKEN_GEN_ALL_CORE_FT
DME: one touch test strips     3  times daily   DME:  One touch glucometer acetaminophen 325 mg oral tablet: 2 tab(s) orally every 6 hours, As needed, Temp greater or equal to 38C (100.4F), Mild Pain (1 - 3)  aspirin 325 mg oral delayed release tablet: 1 tab(s) orally once a day  Basaglar KwikPen 100 units/mL subcutaneous solution: 20 unit(s) subcutaneous once a day (at bedtime)   clopidogrel 75 mg oral tablet: 1 tab(s) orally once a day  DME: One Verio Touch Glucometer  1 application subcutaneous 4 times a day  for diabetes ICD code 11-9   DME: 1 application subcutaneous 4 times a day glucose test strips Verio strips       ICD code 11-9   DME: BD Dominga pen needles 4mm 32G  Use as directed #4 times daily   Disp one month supply  DME: Lancets  Test blood sugar #4 times daily  Disp one month supply      ICD 11-9  DME: Alcohol swab pads  Use as directed  Disp #1 boxes  DME: one touch test strips     3  times daily   DME:  One touch glucometer   furosemide 40 mg oral tablet: 1 tab(s) orally once a day  metoprolol tartrate 25 mg oral tablet: 1 tab(s) orally every 12 hours   oxycodone-acetaminophen 5 mg-325 mg oral tablet: 1 tab(s) orally every 6 hours, As Needed -Moderate Pain (4 - 6) MDD:4  pantoprazole 40 mg oral delayed release tablet: 1 tab(s) orally once a day  polyethylene glycol 3350 oral powder for reconstitution: 17 gram(s) orally once a day  potassium chloride 20 mEq oral tablet, extended release: 1 tab(s) orally once a day  senna oral tablet: 2 tab(s) orally once a day (at bedtime)  simvastatin 20 mg oral tablet: 1 tab(s) orally once a day (at bedtime)

## 2019-12-07 NOTE — DISCHARGE NOTE PROVIDER - NSDCFUADDAPPT_GEN_ALL_CORE_FT
Please follow up with Dr. Pires on _______.  The cardiac surgery office is on the second floor of [facility].   Take the [place] ("G") elevators to 2 and make a left.   Walk down to the second hallway on your left (before the double doors).   Sign says Cardiovascular and Thoracic Surgery. The waiting room is on your left.    Your Care Navigator Nurse Practitioner will be in touch to see you in your home within a few days from discharge. The Follow Your Heart program can help ensure you understand your medications, discharge instructions and answer any questions you may have at that time. They are also a great source to address concerns during the day and may be reached at [phone].

## 2019-12-07 NOTE — DISCHARGE NOTE PROVIDER - NSDCFUADDINST_GEN_ALL_CORE_FT
Please call the Cardiothoracic Surgery office at [phone] if you are experiencing any shortness of breath, chest pain, fevers or chills, drainage from the incisions, persistent nausea, vomiting or if you have any questions about your medications. If the symptoms are severe, call 911 and go to the nearest hospital. You can also call [phone] for an emergency [facility] ambulance, which will take you to the closest [facility].    If you need any assistance for making any appointments for a new consult or referral in any specialty, please call our [facility] Clinical Coordination Center at [phone]. Please call the Cardiothoracic Surgery office at [phone] if you are experiencing any shortness of breath, chest pain, fevers or chills, drainage from the incisions, persistent nausea, vomiting or if you have any questions about your medications. If the symptoms are severe, call 911 and go to the nearest hospital. You can also call [phone] for an emergency [facility] ambulance, which will take you to the closest [facility].    If you need any assistance for making any appointments for a new consult or referral in any specialty, please call our [facility] Clinical Coordination Center at [phone].  1. Daily Shower  2. Weight yourself daily and notify any weight gain greater than 2-3 pounds in 24 hours.  3. Regular diet - low fat, low cholesterol, no added salt.  4. Cleanse midsternum & right leg incision daily while showering with warm water and mild soap (DOVE or IVORY), pat dry and maintain open to air.   DO NOT APPLY ANY LOTION, CREAMS, OR POWDERS TO INCISIONS, KEEP OPEN TO AIR  5. No driving until cleared by MD.   6. No heavy lifting nothing greater than 5 pounds until cleared by MD.   7. Call / Notify MD any fever greater than 101.0  8. Increase Activity as tolerated.  9. Utilize heart pillow to splint pillow

## 2019-12-07 NOTE — PROGRESS NOTE ADULT - SUBJECTIVE AND OBJECTIVE BOX
Subjective: Pt in bed NAD Family at bedside     VITAL SIGNS  T(C): 36.3 (19 @ 15:37), Max: 37 (19 @ 05:41)  HR: 100 (19 @ 17:35) (92 - 105)  BP: 132/81 (19 @ 17:35) (99/62 - 140/84)  RR: 18 (19 @ 15:37) (18 - 18)  SpO2: 99% (19 @ 15:37) (95% - 99%) RA          Daily     Daily Weight in k.7 (07 Dec 2019 06:08)  Admit Wt: Drug Dosing Weight  Height (cm): 175.26 (03 Dec 2019 06:27)  Weight (kg): 96.2 (03 Dec 2019 06:27)  Telemetry:   SR   LVEF:  60%    MEDICATIONS  aspirin enteric coated 325 milliGRAM(s) Oral daily  ATENolol  Tablet 50 milliGRAM(s) Oral two times a day  bisacodyl 5 milliGRAM(s) Oral every 12 hours PRN  clopidogrel Tablet 75 milliGRAM(s) Oral daily  dextrose 40% Gel 15 Gram(s) Oral once PRN  dextrose 50% Injectable 50 milliLiter(s) IV Push every 15 minutes  dextrose 50% Injectable 25 milliLiter(s) IV Push every 15 minutes  enoxaparin Injectable 40 milliGRAM(s) SubCutaneous daily  furosemide    Tablet 40 milliGRAM(s) Oral daily  insulin glargine Injectable (LANTUS) 20 Unit(s) SubCutaneous at bedtime  insulin lispro (HumaLOG) corrective regimen sliding scale   SubCutaneous Before meals and at bedtime  insulin lispro Injectable (HumaLOG) 4 Unit(s) SubCutaneous three times a day before meals  oxycodone    5 mG/acetaminophen 325 mG 1 Tablet(s) Oral every 4 hours PRN  oxycodone    5 mG/acetaminophen 325 mG 2 Tablet(s) Oral every 4 hours PRN  pantoprazole    Tablet 40 milliGRAM(s) Oral daily  polyethylene glycol 3350 17 Gram(s) Oral daily  senna 2 Tablet(s) Oral at bedtime  simvastatin 20 milliGRAM(s) Oral at bedtime    MEDICATIONS  (PRN):  bisacodyl 5 milliGRAM(s) Oral every 12 hours PRN Constipation  dextrose 40% Gel 15 Gram(s) Oral once PRN Blood Glucose LESS THAN 70 milliGRAM(s)/deciLiter  oxycodone    5 mG/acetaminophen 325 mG 1 Tablet(s) Oral every 4 hours PRN Moderate Pain (4 - 6)  oxycodone    5 mG/acetaminophen 325 mG 2 Tablet(s) Oral every 4 hours PRN Severe Pain (7 - 10)        PHYSICAL EXAM  General: Alert awake NAD  Respiratory:  RRR S1 S2  CV: RRR S1 S2  Abdomen:  soft NT ND + BS   Extremities: trace edema b/l LE   Incisions: C/D/I mepilex C/D/I    I&O's Detail  06 Dec 2019 07:01  -  07 Dec 2019 07:00  --------------------------------------------------------  IN:    Oral Fluid: 180 mL  Total IN: 180 mL  OUT:    Voided: 1250 mL  Total OUT: 1250 mL  Total NET: -1070 mL  07 Dec 2019 07:01  -  07 Dec 2019 19:27  --------------------------------------------------------  IN:    Oral Fluid: 240 mL  Total IN: 240 mL  OUT:  Total OUT: 0 mL  Total NET: 240 mL  LABS      138  |  104  |  17.0  ----------------------------<  181<H>  3.8   |  20.0<L>  |  0.84    Ca    8.1<L>      07 Dec 2019 05:42  Mg     2.0                         10.0   9.30  )-----------( 148      ( 07 Dec 2019 05:42 )             31.6   CAPILLARY BLOOD GLUCOSE  POCT Blood Glucose.: 172 mg/dL (07 Dec 2019 17:30)  POCT Blood Glucose.: 246 mg/dL (07 Dec 2019 12:07)  POCT Blood Glucose.: 168 mg/dL (07 Dec 2019 08:19)  POCT Blood Glucose.: 152 mg/dL (06 Dec 2019 22:38)  Today's CXR: < from: Xray Chest 1 View- PORTABLE-Routine (19 @ 07:05) >  Heart/Mediastinum/Lungs: Cardiomegaly, pulmonary vascular congestion.   Median sternotomy wires.    Impression:    As above.    < end of copied text >      PAST MEDICAL & SURGICAL HISTORY:  Osteoporosis  DJD (degenerative joint disease)  CAD (coronary artery disease)  HTN (hypertension)  GERD (gastroesophageal reflux disease)  Hyperlipidemia  Hepatitis B  Heartburn  Diabetes 1.5, managed as type 2  Arteriosclerosis  Status post cataract extraction: left eye done on 10/2/2019  History of cardiac catheterization: with 4 stents ,,

## 2019-12-08 DIAGNOSIS — I95.9 HYPOTENSION, UNSPECIFIED: ICD-10-CM

## 2019-12-08 LAB
ANION GAP SERPL CALC-SCNC: 16 MMOL/L — SIGNIFICANT CHANGE UP (ref 5–17)
BUN SERPL-MCNC: 18 MG/DL — SIGNIFICANT CHANGE UP (ref 8–20)
CALCIUM SERPL-MCNC: 8.9 MG/DL — SIGNIFICANT CHANGE UP (ref 8.6–10.2)
CHLORIDE SERPL-SCNC: 103 MMOL/L — SIGNIFICANT CHANGE UP (ref 98–107)
CO2 SERPL-SCNC: 21 MMOL/L — LOW (ref 22–29)
CREAT SERPL-MCNC: 0.91 MG/DL — SIGNIFICANT CHANGE UP (ref 0.5–1.3)
GLUCOSE BLDC GLUCOMTR-MCNC: 120 MG/DL — HIGH (ref 70–99)
GLUCOSE BLDC GLUCOMTR-MCNC: 131 MG/DL — HIGH (ref 70–99)
GLUCOSE BLDC GLUCOMTR-MCNC: 138 MG/DL — HIGH (ref 70–99)
GLUCOSE BLDC GLUCOMTR-MCNC: 160 MG/DL — HIGH (ref 70–99)
GLUCOSE BLDC GLUCOMTR-MCNC: 162 MG/DL — HIGH (ref 70–99)
GLUCOSE SERPL-MCNC: 129 MG/DL — HIGH (ref 70–115)
HCT VFR BLD CALC: 36.5 % — LOW (ref 39–50)
HGB BLD-MCNC: 11 G/DL — LOW (ref 13–17)
MAGNESIUM SERPL-MCNC: 2.3 MG/DL — SIGNIFICANT CHANGE UP (ref 1.8–2.6)
MCHC RBC-ENTMCNC: 27 PG — SIGNIFICANT CHANGE UP (ref 27–34)
MCHC RBC-ENTMCNC: 30.1 GM/DL — LOW (ref 32–36)
MCV RBC AUTO: 89.5 FL — SIGNIFICANT CHANGE UP (ref 80–100)
PHOSPHATE SERPL-MCNC: 2.9 MG/DL — SIGNIFICANT CHANGE UP (ref 2.4–4.7)
PLATELET # BLD AUTO: 196 K/UL — SIGNIFICANT CHANGE UP (ref 150–400)
POTASSIUM SERPL-MCNC: 4.4 MMOL/L — SIGNIFICANT CHANGE UP (ref 3.5–5.3)
POTASSIUM SERPL-SCNC: 4.4 MMOL/L — SIGNIFICANT CHANGE UP (ref 3.5–5.3)
RBC # BLD: 4.08 M/UL — LOW (ref 4.2–5.8)
RBC # FLD: 16.6 % — HIGH (ref 10.3–14.5)
SODIUM SERPL-SCNC: 140 MMOL/L — SIGNIFICANT CHANGE UP (ref 135–145)
WBC # BLD: 11.58 K/UL — HIGH (ref 3.8–10.5)
WBC # FLD AUTO: 11.58 K/UL — HIGH (ref 3.8–10.5)

## 2019-12-08 PROCEDURE — 71045 X-RAY EXAM CHEST 1 VIEW: CPT | Mod: 26

## 2019-12-08 PROCEDURE — 99231 SBSQ HOSP IP/OBS SF/LOW 25: CPT

## 2019-12-08 PROCEDURE — 93306 TTE W/DOPPLER COMPLETE: CPT | Mod: 26

## 2019-12-08 RX ORDER — METOPROLOL TARTRATE 50 MG
25 TABLET ORAL
Refills: 0 | Status: DISCONTINUED | OUTPATIENT
Start: 2019-12-08 | End: 2019-12-09

## 2019-12-08 RX ORDER — ACETAMINOPHEN 500 MG
650 TABLET ORAL EVERY 6 HOURS
Refills: 0 | Status: DISCONTINUED | OUTPATIENT
Start: 2019-12-08 | End: 2019-12-09

## 2019-12-08 RX ADMIN — Medication 4 UNIT(S): at 08:30

## 2019-12-08 RX ADMIN — Medication 40 MILLIGRAM(S): at 05:30

## 2019-12-08 RX ADMIN — Medication 20 MILLIEQUIVALENT(S): at 08:29

## 2019-12-08 RX ADMIN — Medication 4 UNIT(S): at 17:09

## 2019-12-08 RX ADMIN — Medication 325 MILLIGRAM(S): at 08:29

## 2019-12-08 RX ADMIN — Medication 25 MILLIGRAM(S): at 17:09

## 2019-12-08 RX ADMIN — ENOXAPARIN SODIUM 40 MILLIGRAM(S): 100 INJECTION SUBCUTANEOUS at 22:30

## 2019-12-08 RX ADMIN — SENNA PLUS 2 TABLET(S): 8.6 TABLET ORAL at 22:31

## 2019-12-08 RX ADMIN — SIMVASTATIN 20 MILLIGRAM(S): 20 TABLET, FILM COATED ORAL at 22:30

## 2019-12-08 RX ADMIN — POLYETHYLENE GLYCOL 3350 17 GRAM(S): 17 POWDER, FOR SOLUTION ORAL at 08:29

## 2019-12-08 RX ADMIN — Medication 2: at 08:30

## 2019-12-08 RX ADMIN — CLOPIDOGREL BISULFATE 75 MILLIGRAM(S): 75 TABLET, FILM COATED ORAL at 08:29

## 2019-12-08 RX ADMIN — PANTOPRAZOLE SODIUM 40 MILLIGRAM(S): 20 TABLET, DELAYED RELEASE ORAL at 08:29

## 2019-12-08 RX ADMIN — INSULIN GLARGINE 20 UNIT(S): 100 INJECTION, SOLUTION SUBCUTANEOUS at 22:37

## 2019-12-08 RX ADMIN — Medication 4 UNIT(S): at 12:38

## 2019-12-08 NOTE — PROGRESS NOTE ADULT - PROBLEM SELECTOR PLAN 1
changed betablocker with reduced dose  continue aspirin and plavix for acute graft closure prophylaxis  continue statin for chronic graft patency prophylaxis  oxygenating well, coughing and deep breathing exercises/incentive spirometry instructed and strongly encouraged  lasix daily as per Dr Pires,   continue with PT, increase activity as tolerated  Percocet PRN for analgesia

## 2019-12-08 NOTE — PROGRESS NOTE ADULT - SUBJECTIVE AND OBJECTIVE BOX
Subjective: "I am ok, so I need insulin when I go home, "  OOB chair,        Tele:  SR  80s           V/S:                    T(F): 98.3 (19 @ 10:00), Max: 98.8 (19 @ 21:00)  HR: 87 (19 @ 10:00) (72 - 104)  BP: 94/61 (19 @ 10:00) (92/50 - 140/84)  RR: 18 (19 @ 10:00) (12 - 18)  SpO2: 95% (19 @ 10:00) (94% - 99%)  Wt(kg): --      LV EF: 60%      Labs:      140  |  103  |  18.0  ----------------------------<  129<H>  4.4   |  21.0<L>  |  0.91    Ca    8.9      08 Dec 2019 06:15  Phos  2.9       Mg     2.3                                      11.0   11.58 )-----------( 196      ( 08 Dec 2019 06:15 )             36.5             CAPILLARY BLOOD GLUCOSE      POCT Blood Glucose.: 160 mg/dL (08 Dec 2019 08:24)  POCT Blood Glucose.: 143 mg/dL (07 Dec 2019 22:51)  POCT Blood Glucose.: 160 mg/dL (07 Dec 2019 20:48)  POCT Blood Glucose.: 172 mg/dL (07 Dec 2019 17:30)  POCT Blood Glucose.: 246 mg/dL (07 Dec 2019 12:07)           CXR:< from: Xray Chest 1 View- PORTABLE-Routine (19 @ 04:29) >  Pulmonary vascular congestion and small left pleural effusion.      < end of copied text >      I&O's Detail    07 Dec 2019 07:01  -  08 Dec 2019 07:00  --------------------------------------------------------  IN:    Lactated Ringers IV Bolus: 250 mL    Oral Fluid: 360 mL  Total IN: 610 mL    OUT:  Total OUT: 0 mL    Total NET: 610 mL      08 Dec 2019 07:01  -  08 Dec 2019 12:02  --------------------------------------------------------  IN:    Oral Fluid: 240 mL  Total IN: 240 mL    OUT:  Total OUT: 0 mL    Total NET: 240 mL          CHEST TUBE:  [ ] YES [x ] NO  OUTPUT:     per 24 hours    AIR LEAKS:  [ ] YES [ ] NO      GARETH DRAIN:   [ ] YES [ x] NO  OUTPUT:     per 24 hours    EPICARDIAL WIRES:  [ ] YES [x ] NO      BOWEL MOVEMENT:  [x ] YES [ ] NO      Daily     Daily Weight in k.6 (08 Dec 2019 05:58)  Medications:  aspirin enteric coated 325 milliGRAM(s) Oral daily  bisacodyl 5 milliGRAM(s) Oral every 12 hours PRN  clopidogrel Tablet 75 milliGRAM(s) Oral daily  dextrose 40% Gel 15 Gram(s) Oral once PRN  dextrose 50% Injectable 50 milliLiter(s) IV Push every 15 minutes  dextrose 50% Injectable 25 milliLiter(s) IV Push every 15 minutes  enoxaparin Injectable 40 milliGRAM(s) SubCutaneous daily  furosemide    Tablet 40 milliGRAM(s) Oral daily  insulin glargine Injectable (LANTUS) 20 Unit(s) SubCutaneous at bedtime  insulin lispro (HumaLOG) corrective regimen sliding scale   SubCutaneous Before meals and at bedtime  insulin lispro Injectable (HumaLOG) 4 Unit(s) SubCutaneous three times a day before meals  metoprolol tartrate 25 milliGRAM(s) Oral two times a day  oxycodone    5 mG/acetaminophen 325 mG 1 Tablet(s) Oral every 4 hours PRN  pantoprazole    Tablet 40 milliGRAM(s) Oral daily  polyethylene glycol 3350 17 Gram(s) Oral daily  potassium chloride    Tablet ER 20 milliEquivalent(s) Oral daily  senna 2 Tablet(s) Oral at bedtime  simvastatin 20 milliGRAM(s) Oral at bedtime        Physical Exam:    General: Alert awake NAD    Respiratory:  RRR S1 S2    CV: RRR S1 S2    Abdomen:  soft NT ND + BS     Extremities: trace edema b/l LE     Incisions: C/D/I mepilex C/D/I               PAST MEDICAL & SURGICAL HISTORY:  Osteoporosis  DJD (degenerative joint disease)  CAD (coronary artery disease)  HTN (hypertension)  GERD (gastroesophageal reflux disease)  Hyperlipidemia  Hepatitis B  Heartburn  Diabetes 1.5, managed as type 2  Arteriosclerosis  Status post cataract extraction: left eye done on 10/2/2019  History of cardiac catheterization: with 4 stents ,,

## 2019-12-08 NOTE — PROGRESS NOTE ADULT - REASON FOR ADMISSION
12/2 3 [facility] Preop note
transfer from  with CAD
transfer from  with CAD  12/3 [facility]
Transfer from  with CAD

## 2019-12-08 NOTE — PROGRESS NOTE ADULT - PROBLEM SELECTOR PLAN 6
Lovenox and SCDs for DVT prophylaxis  Protonix for GI prophylaxis  continue with bowel regimen (dulcolax PRN added since pt has not had BM since prior to surgery)
Lovenox and SCDs for DVT prophylaxis  Protonix for GI prophylaxis  continue with bowel regimen (dulcolax PRN added since pt has not had BM since prior to surgery)
SCDs for DVT prophylaxis, consider starting lovenox for chemical dvt ppx   Protonix for GI prophylaxis
SCDs for DVT prophylaxis, consider starting lovenox for chemical dvt ppx later today if able  Protonix for GI prophylaxis  Consider bowel regimen once pt extubated
reduce betablocker  Echo R/O effusion

## 2019-12-08 NOTE — PROGRESS NOTE ADULT - ASSESSMENT
69 year old male PMH CAD, s/p stents x4 (last cath 2013), GERD, HLD, DM (A1c 10.3), osteoporosis, fatty liver. Pt p/w CP and DUBON to [facility]., + NSTEMI, [facility] with multi-vessel CAD. Tx to [facility] for CABG.   12/3/19 s/p CABG x 3. Immediate post-op course notable for acute blood loss anemia (now improved) and coagulopathy (now resolved).   post op hyperglycemia Endocrine following      12/8 Hypotensive betablocker reduced, will continue to monitor

## 2019-12-08 NOTE — PROGRESS NOTE ADULT - SUBJECTIVE AND OBJECTIVE BOX
INTERVAL HPI/OVERNIGHT EVENTS:  follow up of diabetes  MEDICATIONS  (STANDING):  aspirin enteric coated 325 milliGRAM(s) Oral daily  clopidogrel Tablet 75 milliGRAM(s) Oral daily  dextrose 50% Injectable 50 milliLiter(s) IV Push every 15 minutes  dextrose 50% Injectable 25 milliLiter(s) IV Push every 15 minutes  enoxaparin Injectable 40 milliGRAM(s) SubCutaneous daily  furosemide    Tablet 40 milliGRAM(s) Oral daily  insulin glargine Injectable (LANTUS) 20 Unit(s) SubCutaneous at bedtime  insulin lispro (HumaLOG) corrective regimen sliding scale   SubCutaneous Before meals and at bedtime  insulin lispro Injectable (HumaLOG) 4 Unit(s) SubCutaneous three times a day before meals  metoprolol tartrate 25 milliGRAM(s) Oral two times a day  pantoprazole    Tablet 40 milliGRAM(s) Oral daily  polyethylene glycol 3350 17 Gram(s) Oral daily  potassium chloride    Tablet ER 20 milliEquivalent(s) Oral daily  senna 2 Tablet(s) Oral at bedtime  simvastatin 20 milliGRAM(s) Oral at bedtime    MEDICATIONS  (PRN):  acetaminophen   Tablet .. 650 milliGRAM(s) Oral every 6 hours PRN Temp greater or equal to 38C (100.4F), Mild Pain (1 - 3)  bisacodyl 5 milliGRAM(s) Oral every 12 hours PRN Constipation  dextrose 40% Gel 15 Gram(s) Oral once PRN Blood Glucose LESS THAN 70 milliGRAM(s)/deciLiter  oxycodone    5 mG/acetaminophen 325 mG 1 Tablet(s) Oral every 4 hours PRN Moderate Pain (4 - 6)      Allergies    Allergy Status Unknown    Intolerances    Beef (Other (Mod to Severe))  Chicken (Other (Mod to Severe))  No Pork (Unknown)      Review of systems: appetite good    Vital Signs Last 24 Hrs  T(C): 36.8 (08 Dec 2019 10:00), Max: 37.1 (07 Dec 2019 21:00)  T(F): 98.3 (08 Dec 2019 10:00), Max: 98.8 (07 Dec 2019 21:00)  HR: 87 (08 Dec 2019 10:00) (72 - 100)  BP: 94/61 (08 Dec 2019 10:00) (92/50 - 132/81)  BP(mean): 72 (08 Dec 2019 10:00) (72 - 72)  RR: 18 (08 Dec 2019 10:00) (12 - 18)  SpO2: 95% (08 Dec 2019 10:00) (94% - 97%)          LABS:                        11.0   11.58 )-----------( 196      ( 08 Dec 2019 06:15 )             36.5     12-08    140  |  103  |  18.0  ----------------------------<  129<H>  4.4   |  21.0<L>  |  0.91    Ca    8.9      08 Dec 2019 06:15  Phos  2.9     12-08  Mg     2.3     12-08            POCT Blood Glucose.: 138 mg/dL (12-08-19 @ 12:15)  POCT Blood Glucose.: 160 mg/dL (12-08-19 @ 08:24)  POCT Blood Glucose.: 143 mg/dL (12-07-19 @ 22:51)  POCT Blood Glucose.: 160 mg/dL (12-07-19 @ 20:48)  POCT Blood Glucose.: 172 mg/dL (12-07-19 @ 17:30)  POCT Blood Glucose.: 246 mg/dL (12-07-19 @ 12:07)  POCT Blood Glucose.: 168 mg/dL (12-07-19 @ 08:19)  POCT Blood Glucose.: 152 mg/dL (12-06-19 @ 22:38)  POCT Blood Glucose.: 126 mg/dL (12-06-19 @ 18:52)  POCT Blood Glucose.: 163 mg/dL (12-06-19 @ 16:08)  POCT Blood Glucose.: 210 mg/dL (12-06-19 @ 11:34)  POCT Blood Glucose.: 167 mg/dL (12-06-19 @ 08:05)  POCT Blood Glucose.: 179 mg/dL (12-06-19 @ 06:30)  POCT Blood Glucose.: 173 mg/dL (12-05-19 @ 23:49)  POCT Blood Glucose.: 201 mg/dL (12-05-19 @ 21:25)  POCT Blood Glucose.: 187 mg/dL (12-05-19 @ 20:21)  POCT Blood Glucose.: 193 mg/dL (12-05-19 @ 17:54)  POCT Blood Glucose.: 165 mg/dL (12-05-19 @ 16:38)      RADIOLOGY & ADDITIONAL TESTS:

## 2019-12-09 ENCOUNTER — TRANSCRIPTION ENCOUNTER (OUTPATIENT)
Age: 69
End: 2019-12-09

## 2019-12-09 VITALS
HEART RATE: 100 BPM | OXYGEN SATURATION: 96 % | DIASTOLIC BLOOD PRESSURE: 63 MMHG | TEMPERATURE: 98 F | SYSTOLIC BLOOD PRESSURE: 108 MMHG | RESPIRATION RATE: 18 BRPM

## 2019-12-09 LAB
ALBUMIN SERPL ELPH-MCNC: 3.3 G/DL — SIGNIFICANT CHANGE UP (ref 3.3–5.2)
ALP SERPL-CCNC: 98 U/L — SIGNIFICANT CHANGE UP (ref 40–120)
ALT FLD-CCNC: 21 U/L — SIGNIFICANT CHANGE UP
ANION GAP SERPL CALC-SCNC: 16 MMOL/L — SIGNIFICANT CHANGE UP (ref 5–17)
AST SERPL-CCNC: 24 U/L — SIGNIFICANT CHANGE UP
BILIRUB SERPL-MCNC: 1.2 MG/DL — SIGNIFICANT CHANGE UP (ref 0.4–2)
BUN SERPL-MCNC: 16 MG/DL — SIGNIFICANT CHANGE UP (ref 8–20)
CALCIUM SERPL-MCNC: 8.5 MG/DL — LOW (ref 8.6–10.2)
CHLORIDE SERPL-SCNC: 101 MMOL/L — SIGNIFICANT CHANGE UP (ref 98–107)
CO2 SERPL-SCNC: 20 MMOL/L — LOW (ref 22–29)
CREAT SERPL-MCNC: 0.9 MG/DL — SIGNIFICANT CHANGE UP (ref 0.5–1.3)
GLUCOSE BLDC GLUCOMTR-MCNC: 185 MG/DL — HIGH (ref 70–99)
GLUCOSE SERPL-MCNC: 148 MG/DL — HIGH (ref 70–115)
HCT VFR BLD CALC: 33.3 % — LOW (ref 39–50)
HGB BLD-MCNC: 10.2 G/DL — LOW (ref 13–17)
MAGNESIUM SERPL-MCNC: 2.1 MG/DL — SIGNIFICANT CHANGE UP (ref 1.6–2.6)
MCHC RBC-ENTMCNC: 26.9 PG — LOW (ref 27–34)
MCHC RBC-ENTMCNC: 30.6 GM/DL — LOW (ref 32–36)
MCV RBC AUTO: 87.9 FL — SIGNIFICANT CHANGE UP (ref 80–100)
PLATELET # BLD AUTO: 203 K/UL — SIGNIFICANT CHANGE UP (ref 150–400)
POTASSIUM SERPL-MCNC: 3.8 MMOL/L — SIGNIFICANT CHANGE UP (ref 3.5–5.3)
POTASSIUM SERPL-SCNC: 3.8 MMOL/L — SIGNIFICANT CHANGE UP (ref 3.5–5.3)
PROT SERPL-MCNC: 6.6 G/DL — SIGNIFICANT CHANGE UP (ref 6.6–8.7)
RBC # BLD: 3.79 M/UL — LOW (ref 4.2–5.8)
RBC # FLD: 16.5 % — HIGH (ref 10.3–14.5)
SODIUM SERPL-SCNC: 137 MMOL/L — SIGNIFICANT CHANGE UP (ref 135–145)
WBC # BLD: 8.48 K/UL — SIGNIFICANT CHANGE UP (ref 3.8–10.5)
WBC # FLD AUTO: 8.48 K/UL — SIGNIFICANT CHANGE UP (ref 3.8–10.5)

## 2019-12-09 PROCEDURE — 93306 TTE W/DOPPLER COMPLETE: CPT

## 2019-12-09 PROCEDURE — 84100 ASSAY OF PHOSPHORUS: CPT

## 2019-12-09 PROCEDURE — 82803 BLOOD GASES ANY COMBINATION: CPT

## 2019-12-09 PROCEDURE — 80053 COMPREHEN METABOLIC PANEL: CPT

## 2019-12-09 PROCEDURE — 86900 BLOOD TYPING SEROLOGIC ABO: CPT

## 2019-12-09 PROCEDURE — 83880 ASSAY OF NATRIURETIC PEPTIDE: CPT

## 2019-12-09 PROCEDURE — 71045 X-RAY EXAM CHEST 1 VIEW: CPT

## 2019-12-09 PROCEDURE — 82962 GLUCOSE BLOOD TEST: CPT

## 2019-12-09 PROCEDURE — P9035: CPT

## 2019-12-09 PROCEDURE — 84443 ASSAY THYROID STIM HORMONE: CPT

## 2019-12-09 PROCEDURE — 81003 URINALYSIS AUTO W/O SCOPE: CPT

## 2019-12-09 PROCEDURE — P9016: CPT

## 2019-12-09 PROCEDURE — 86923 COMPATIBILITY TEST ELECTRIC: CPT

## 2019-12-09 PROCEDURE — 93880 EXTRACRANIAL BILAT STUDY: CPT

## 2019-12-09 PROCEDURE — 85027 COMPLETE CBC AUTOMATED: CPT

## 2019-12-09 PROCEDURE — 36415 COLL VENOUS BLD VENIPUNCTURE: CPT

## 2019-12-09 PROCEDURE — 97110 THERAPEUTIC EXERCISES: CPT

## 2019-12-09 PROCEDURE — 93312 ECHO TRANSESOPHAGEAL: CPT

## 2019-12-09 PROCEDURE — 85014 HEMATOCRIT: CPT

## 2019-12-09 PROCEDURE — 84484 ASSAY OF TROPONIN QUANT: CPT

## 2019-12-09 PROCEDURE — 94010 BREATHING CAPACITY TEST: CPT

## 2019-12-09 PROCEDURE — 97163 PT EVAL HIGH COMPLEX 45 MIN: CPT

## 2019-12-09 PROCEDURE — 93010 ELECTROCARDIOGRAM REPORT: CPT

## 2019-12-09 PROCEDURE — 99024 POSTOP FOLLOW-UP VISIT: CPT

## 2019-12-09 PROCEDURE — 85610 PROTHROMBIN TIME: CPT

## 2019-12-09 PROCEDURE — 85730 THROMBOPLASTIN TIME PARTIAL: CPT

## 2019-12-09 PROCEDURE — P9045: CPT

## 2019-12-09 PROCEDURE — 86850 RBC ANTIBODY SCREEN: CPT

## 2019-12-09 PROCEDURE — 86985 SPLIT BLOOD OR PRODUCTS: CPT

## 2019-12-09 PROCEDURE — 71045 X-RAY EXAM CHEST 1 VIEW: CPT | Mod: 26

## 2019-12-09 PROCEDURE — 82435 ASSAY OF BLOOD CHLORIDE: CPT

## 2019-12-09 PROCEDURE — 84132 ASSAY OF SERUM POTASSIUM: CPT

## 2019-12-09 PROCEDURE — 83605 ASSAY OF LACTIC ACID: CPT

## 2019-12-09 PROCEDURE — P9059: CPT

## 2019-12-09 PROCEDURE — 97530 THERAPEUTIC ACTIVITIES: CPT

## 2019-12-09 PROCEDURE — 36430 TRANSFUSION BLD/BLD COMPNT: CPT

## 2019-12-09 PROCEDURE — 80048 BASIC METABOLIC PNL TOTAL CA: CPT

## 2019-12-09 PROCEDURE — 94002 VENT MGMT INPAT INIT DAY: CPT

## 2019-12-09 PROCEDURE — 87641 MR-STAPH DNA AMP PROBE: CPT

## 2019-12-09 PROCEDURE — 85576 BLOOD PLATELET AGGREGATION: CPT

## 2019-12-09 PROCEDURE — 87640 STAPH A DNA AMP PROBE: CPT

## 2019-12-09 PROCEDURE — 82947 ASSAY GLUCOSE BLOOD QUANT: CPT

## 2019-12-09 PROCEDURE — 84134 ASSAY OF PREALBUMIN: CPT

## 2019-12-09 PROCEDURE — P9011: CPT

## 2019-12-09 PROCEDURE — 94003 VENT MGMT INPAT SUBQ DAY: CPT

## 2019-12-09 PROCEDURE — 82550 ASSAY OF CK (CPK): CPT

## 2019-12-09 PROCEDURE — 94760 N-INVAS EAR/PLS OXIMETRY 1: CPT

## 2019-12-09 PROCEDURE — 83735 ASSAY OF MAGNESIUM: CPT

## 2019-12-09 PROCEDURE — 93005 ELECTROCARDIOGRAM TRACING: CPT

## 2019-12-09 PROCEDURE — 31720 CLEARANCE OF AIRWAYS: CPT

## 2019-12-09 PROCEDURE — 82330 ASSAY OF CALCIUM: CPT

## 2019-12-09 PROCEDURE — 86901 BLOOD TYPING SEROLOGIC RH(D): CPT

## 2019-12-09 PROCEDURE — 84295 ASSAY OF SERUM SODIUM: CPT

## 2019-12-09 PROCEDURE — 97116 GAIT TRAINING THERAPY: CPT

## 2019-12-09 PROCEDURE — 82553 CREATINE MB FRACTION: CPT

## 2019-12-09 RX ORDER — SIMVASTATIN 20 MG/1
1 TABLET, FILM COATED ORAL
Qty: 30 | Refills: 0
Start: 2019-12-09 | End: 2020-01-07

## 2019-12-09 RX ORDER — FUROSEMIDE 40 MG
1 TABLET ORAL
Qty: 30 | Refills: 0
Start: 2019-12-09 | End: 2020-01-07

## 2019-12-09 RX ORDER — ASPIRIN/CALCIUM CARB/MAGNESIUM 324 MG
1 TABLET ORAL
Qty: 30 | Refills: 0
Start: 2019-12-09 | End: 2020-01-07

## 2019-12-09 RX ORDER — PANTOPRAZOLE SODIUM 20 MG/1
1 TABLET, DELAYED RELEASE ORAL
Qty: 14 | Refills: 0
Start: 2019-12-09 | End: 2019-12-22

## 2019-12-09 RX ORDER — POTASSIUM CHLORIDE 20 MEQ
1 PACKET (EA) ORAL
Qty: 30 | Refills: 0
Start: 2019-12-09 | End: 2020-01-07

## 2019-12-09 RX ORDER — INSULIN GLARGINE 100 [IU]/ML
20 INJECTION, SOLUTION SUBCUTANEOUS
Qty: 1 | Refills: 2
Start: 2019-12-09 | End: 2020-03-07

## 2019-12-09 RX ORDER — ACETAMINOPHEN 500 MG
2 TABLET ORAL
Qty: 0 | Refills: 0 | DISCHARGE
Start: 2019-12-09

## 2019-12-09 RX ORDER — SENNA PLUS 8.6 MG/1
2 TABLET ORAL
Qty: 0 | Refills: 0 | DISCHARGE
Start: 2019-12-09

## 2019-12-09 RX ORDER — METOPROLOL TARTRATE 50 MG
1 TABLET ORAL
Qty: 60 | Refills: 0
Start: 2019-12-09 | End: 2020-01-07

## 2019-12-09 RX ORDER — POTASSIUM CHLORIDE 20 MEQ
40 PACKET (EA) ORAL ONCE
Refills: 0 | Status: COMPLETED | OUTPATIENT
Start: 2019-12-09 | End: 2019-12-09

## 2019-12-09 RX ORDER — CLOPIDOGREL BISULFATE 75 MG/1
1 TABLET, FILM COATED ORAL
Qty: 30 | Refills: 0
Start: 2019-12-09 | End: 2020-01-07

## 2019-12-09 RX ORDER — POLYETHYLENE GLYCOL 3350 17 G/17G
17 POWDER, FOR SOLUTION ORAL
Qty: 0 | Refills: 0 | DISCHARGE
Start: 2019-12-09

## 2019-12-09 RX ADMIN — Medication 25 MILLIGRAM(S): at 05:13

## 2019-12-09 RX ADMIN — CLOPIDOGREL BISULFATE 75 MILLIGRAM(S): 75 TABLET, FILM COATED ORAL at 10:42

## 2019-12-09 RX ADMIN — Medication 40 MILLIGRAM(S): at 05:13

## 2019-12-09 RX ADMIN — Medication 2: at 08:53

## 2019-12-09 RX ADMIN — Medication 325 MILLIGRAM(S): at 10:42

## 2019-12-09 RX ADMIN — Medication 4 UNIT(S): at 08:53

## 2019-12-09 RX ADMIN — Medication 650 MILLIGRAM(S): at 05:45

## 2019-12-09 RX ADMIN — Medication 650 MILLIGRAM(S): at 05:12

## 2019-12-09 RX ADMIN — PANTOPRAZOLE SODIUM 40 MILLIGRAM(S): 20 TABLET, DELAYED RELEASE ORAL at 10:42

## 2019-12-09 RX ADMIN — ENOXAPARIN SODIUM 40 MILLIGRAM(S): 100 INJECTION SUBCUTANEOUS at 10:42

## 2019-12-09 RX ADMIN — Medication 40 MILLIEQUIVALENT(S): at 09:53

## 2019-12-09 RX ADMIN — Medication 20 MILLIEQUIVALENT(S): at 10:42

## 2019-12-09 NOTE — DISCHARGE NOTE NURSING/CASE MANAGEMENT/SOCIAL WORK - PATIENT PORTAL LINK FT
You can access the FollowMyHealth Patient Portal offered by [facility] by registering at the following website: [email or web address]. By joining [facility] FollowMyHealth portal, you will also be able to view your health information using other applications (apps) compatible with our system. no

## 2019-12-09 NOTE — DISCHARGE NOTE NURSING/CASE MANAGEMENT/SOCIAL WORK - NSDCFUADDAPPT_GEN_ALL_CORE_FT
Please follow up with Dr. Pires on _______.  The cardiac surgery office is on the second floor of [facility].   Take the [place] ("G") elevators to 2 and make a left.   Walk down to the second hallway on your left (before the double doors).   Sign says Cardiovascular and Thoracic Surgery. The waiting room is on your left.    Your Care Navigator Nurse Practitioner will be in touch to see you in your home within a few days from discharge. The Follow Your Heart program can help ensure you understand your medications, discharge instructions and answer any questions you may have at that time. They are also a great source to address concerns during the day and may be reached at [phone].

## 2019-12-12 ENCOUNTER — APPOINTMENT (OUTPATIENT)
Dept: CARE COORDINATION | Facility: HOME HEALTH | Age: 69
End: 2019-12-12
Payer: MEDICARE

## 2019-12-12 VITALS
WEIGHT: 214 LBS | RESPIRATION RATE: 16 BRPM | OXYGEN SATURATION: 97 % | HEART RATE: 90 BPM | SYSTOLIC BLOOD PRESSURE: 104 MMHG | BODY MASS INDEX: 30.64 KG/M2 | DIASTOLIC BLOOD PRESSURE: 65 MMHG | HEIGHT: 70 IN

## 2019-12-12 PROCEDURE — 99024 POSTOP FOLLOW-UP VISIT: CPT

## 2019-12-12 RX ORDER — GLIMEPIRIDE 4 MG/1
4 TABLET ORAL DAILY
Refills: 0 | Status: DISCONTINUED | COMMUNITY
End: 2019-12-12

## 2019-12-12 RX ORDER — MELOXICAM 15 MG/1
15 TABLET ORAL
Qty: 30 | Refills: 0 | Status: DISCONTINUED | COMMUNITY
Start: 2019-08-19 | End: 2019-12-12

## 2019-12-12 RX ORDER — METFORMIN HYDROCHLORIDE 1000 MG/1
1000 TABLET, EXTENDED RELEASE ORAL
Qty: 180 | Refills: 0 | Status: DISCONTINUED | COMMUNITY
Start: 2018-11-20 | End: 2019-12-12

## 2019-12-12 NOTE — REASON FOR VISIT
[Follow-Up: _____] : a [unfilled] follow-up visit [Spouse] : spouse [Family Member] : family member [FreeTextEntry1] : FOLLOW YOUR HEART- Transitional Care Management Program- [facility]\par \par

## 2019-12-12 NOTE — PHYSICAL EXAM
[Sclera] : the sclera and conjunctiva were normal [Neck Appearance] : the appearance of the neck was normal [Heart Rate And Rhythm] : heart rate was normal and rhythm regular [Auscultation Breath Sounds / Voice Sounds] : lungs were clear to auscultation bilaterally [Respiration, Rhythm And Depth] : normal respiratory rhythm and effort [Heart Sounds] : normal S1 and S2 [Chest Visual Inspection Thoracic Asymmetry] : no chest asymmetry [1+] : left 1+ [Abdomen Soft] : soft [FreeTextEntry2] : B/L LE with trace edema, B/L calves soft, NT [Abdomen Tenderness] : non-tender [Abnormal Walk] : normal gait [Musculoskeletal - Swelling] : no joint swelling seen [Skin Color & Pigmentation] : normal skin color and pigmentation [Skin Turgor] : normal skin turgor [] : no rash [FreeTextEntry1] : SVG harvest site without erythema, warmth or drainage. Eccymosis noted to upper inner thigh, soft resolving [Oriented To Time, Place, And Person] : oriented to person, place, and time [Affect] : the affect was normal [Impaired Insight] : insight and judgment were intact

## 2019-12-12 NOTE — REVIEW OF SYSTEMS
[Heart Rate Is Slow] : the heart rate was not slow [Heart Rate Is Fast] : the heart rate was not fast [Palpitations] : no palpitations [Chest Pain] : no chest pain [Lower Ext Edema] : lower extremity edema [Negative] : Neurological [FreeTextEntry7] : last BM today

## 2019-12-12 NOTE — ASSESSMENT
[FreeTextEntry1] : Pt recovering well at home s/p OHS. Reviewed all medications and dosages with pt and pt family understanding. Pt has all medications in home and is taking as prescribed. Pain controlled with current medication regimen including occasional Tylenol, pt not requiring narcotics at this time. Blood sugar - average 130-140, pt blood sugar typically elevated prior to dinner. Pt endorses eating snack of fruit or cookies before hand with his tea. Reviewed low sugar and low carb option. Reviewed appropriate blood sugar range post surgery and risks of uncontrolled blood sugar. Pt with understanding. Will allow for dietary changes to take place and increase Basaglar if fasting blood sugars remain above 150.  No new symptoms, issues or concerns to report at this time.\par

## 2019-12-12 NOTE — HISTORY OF PRESENT ILLNESS
[FreeTextEntry1] : 69 YOM with pmhx including DM (A1C 10.3), HTN, HLD, osteoporosis, GERD. , CAD with stents x 4. Fatty liver. Pt underwent a Cabg x4 with Dr. Pires on 12/3. Post op course included hyperglycemia otherwise unremarkable. Pt discharged home with support of home care services and supportive family. Initial [facility] visit. \par CC "I'm feeling good, better every day"

## 2019-12-17 ENCOUNTER — APPOINTMENT (OUTPATIENT)
Dept: CARDIOTHORACIC SURGERY | Facility: CLINIC | Age: 69
End: 2019-12-17
Payer: MEDICARE

## 2019-12-17 ENCOUNTER — INPATIENT (INPATIENT)
Facility: HOSPITAL | Age: 69
LOS: 0 days | Discharge: HOME | DRG: 186 | End: 2019-12-18
Attending: THORACIC SURGERY (CARDIOTHORACIC VASCULAR SURGERY) | Admitting: THORACIC SURGERY (CARDIOTHORACIC VASCULAR SURGERY)
Payer: MEDICARE

## 2019-12-17 ENCOUNTER — OUTPATIENT (OUTPATIENT)
Dept: OUTPATIENT SERVICES | Facility: HOSPITAL | Age: 69
LOS: 1 days | End: 2019-12-17
Payer: MEDICARE

## 2019-12-17 VITALS
HEIGHT: 69 IN | RESPIRATION RATE: 22 BRPM | OXYGEN SATURATION: 97 % | DIASTOLIC BLOOD PRESSURE: 77 MMHG | WEIGHT: 210.1 LBS | TEMPERATURE: 98 F | SYSTOLIC BLOOD PRESSURE: 123 MMHG | HEART RATE: 102 BPM

## 2019-12-17 VITALS
TEMPERATURE: 98.7 F | WEIGHT: 209 LBS | DIASTOLIC BLOOD PRESSURE: 74 MMHG | BODY MASS INDEX: 29.92 KG/M2 | SYSTOLIC BLOOD PRESSURE: 124 MMHG | RESPIRATION RATE: 18 BRPM | HEART RATE: 91 BPM | OXYGEN SATURATION: 97 % | HEIGHT: 70 IN

## 2019-12-17 DIAGNOSIS — E78.5 HYPERLIPIDEMIA, UNSPECIFIED: ICD-10-CM

## 2019-12-17 DIAGNOSIS — Z95.1 PRESENCE OF AORTOCORONARY BYPASS GRAFT: Chronic | ICD-10-CM

## 2019-12-17 DIAGNOSIS — Z98.49 CATARACT EXTRACTION STATUS, UNSPECIFIED EYE: Chronic | ICD-10-CM

## 2019-12-17 DIAGNOSIS — Z98.890 OTHER SPECIFIED POSTPROCEDURAL STATES: Chronic | ICD-10-CM

## 2019-12-17 DIAGNOSIS — I25.10 ATHEROSCLEROTIC HEART DISEASE OF NATIVE CORONARY ARTERY WITHOUT ANGINA PECTORIS: ICD-10-CM

## 2019-12-17 DIAGNOSIS — J90 PLEURAL EFFUSION, NOT ELSEWHERE CLASSIFIED: ICD-10-CM

## 2019-12-17 DIAGNOSIS — I10 ESSENTIAL (PRIMARY) HYPERTENSION: ICD-10-CM

## 2019-12-17 LAB
ALBUMIN SERPL ELPH-MCNC: 3.7 G/DL — SIGNIFICANT CHANGE UP (ref 3.3–5.2)
ALP SERPL-CCNC: 99 U/L — SIGNIFICANT CHANGE UP (ref 40–120)
ALT FLD-CCNC: 15 U/L — SIGNIFICANT CHANGE UP
ANION GAP SERPL CALC-SCNC: 14 MMOL/L — SIGNIFICANT CHANGE UP (ref 5–17)
APTT BLD: 29.8 SEC — SIGNIFICANT CHANGE UP (ref 27.5–36.3)
AST SERPL-CCNC: 27 U/L — SIGNIFICANT CHANGE UP
BILIRUB SERPL-MCNC: 0.8 MG/DL — SIGNIFICANT CHANGE UP (ref 0.4–2)
BUN SERPL-MCNC: 15 MG/DL — SIGNIFICANT CHANGE UP (ref 8–20)
CALCIUM SERPL-MCNC: 9 MG/DL — SIGNIFICANT CHANGE UP (ref 8.6–10.2)
CHLORIDE SERPL-SCNC: 98 MMOL/L — SIGNIFICANT CHANGE UP (ref 98–107)
CO2 SERPL-SCNC: 22 MMOL/L — SIGNIFICANT CHANGE UP (ref 22–29)
CREAT SERPL-MCNC: 1.09 MG/DL — SIGNIFICANT CHANGE UP (ref 0.5–1.3)
GAS PNL BLDA: SIGNIFICANT CHANGE UP
GLUCOSE SERPL-MCNC: 233 MG/DL — HIGH (ref 70–115)
HCT VFR BLD CALC: 34.6 % — LOW (ref 39–50)
HCT VFR BLD CALC: 36.9 % — LOW (ref 39–50)
HGB BLD-MCNC: 10.6 G/DL — LOW (ref 13–17)
HGB BLD-MCNC: 11.1 G/DL — LOW (ref 13–17)
INR BLD: 1.23 RATIO — HIGH (ref 0.88–1.16)
MAGNESIUM SERPL-MCNC: 2 MG/DL — SIGNIFICANT CHANGE UP (ref 1.6–2.6)
MCHC RBC-ENTMCNC: 26.9 PG — LOW (ref 27–34)
MCHC RBC-ENTMCNC: 27.2 PG — SIGNIFICANT CHANGE UP (ref 27–34)
MCHC RBC-ENTMCNC: 30.1 GM/DL — LOW (ref 32–36)
MCHC RBC-ENTMCNC: 30.6 GM/DL — LOW (ref 32–36)
MCV RBC AUTO: 88.9 FL — SIGNIFICANT CHANGE UP (ref 80–100)
MCV RBC AUTO: 89.3 FL — SIGNIFICANT CHANGE UP (ref 80–100)
NT-PROBNP SERPL-SCNC: 689 PG/ML — HIGH (ref 0–300)
PLATELET # BLD AUTO: 366 K/UL — SIGNIFICANT CHANGE UP (ref 150–400)
PLATELET # BLD AUTO: 374 K/UL — SIGNIFICANT CHANGE UP (ref 150–400)
POTASSIUM SERPL-MCNC: 3.9 MMOL/L — SIGNIFICANT CHANGE UP (ref 3.5–5.3)
POTASSIUM SERPL-SCNC: 3.9 MMOL/L — SIGNIFICANT CHANGE UP (ref 3.5–5.3)
PROT SERPL-MCNC: 7.7 G/DL — SIGNIFICANT CHANGE UP (ref 6.6–8.7)
PROTHROM AB SERPL-ACNC: 14.2 SEC — HIGH (ref 10–12.9)
RBC # BLD: 3.89 M/UL — LOW (ref 4.2–5.8)
RBC # BLD: 4.13 M/UL — LOW (ref 4.2–5.8)
RBC # FLD: 16.1 % — HIGH (ref 10.3–14.5)
RBC # FLD: 16.5 % — HIGH (ref 10.3–14.5)
SODIUM SERPL-SCNC: 134 MMOL/L — LOW (ref 135–145)
TROPONIN T SERPL-MCNC: 0.01 NG/ML — SIGNIFICANT CHANGE UP (ref 0–0.06)
WBC # BLD: 8.25 K/UL — SIGNIFICANT CHANGE UP (ref 3.8–10.5)
WBC # BLD: 9.72 K/UL — SIGNIFICANT CHANGE UP (ref 3.8–10.5)
WBC # FLD AUTO: 8.25 K/UL — SIGNIFICANT CHANGE UP (ref 3.8–10.5)
WBC # FLD AUTO: 9.72 K/UL — SIGNIFICANT CHANGE UP (ref 3.8–10.5)

## 2019-12-17 PROCEDURE — 99285 EMERGENCY DEPT VISIT HI MDM: CPT | Mod: GC

## 2019-12-17 PROCEDURE — 71045 X-RAY EXAM CHEST 1 VIEW: CPT | Mod: 26,59,76

## 2019-12-17 PROCEDURE — 99024 POSTOP FOLLOW-UP VISIT: CPT

## 2019-12-17 PROCEDURE — 93306 TTE W/DOPPLER COMPLETE: CPT | Mod: 26

## 2019-12-17 PROCEDURE — 71046 X-RAY EXAM CHEST 2 VIEWS: CPT | Mod: 26

## 2019-12-17 RX ORDER — DEXTROSE 50 % IN WATER 50 %
25 SYRINGE (ML) INTRAVENOUS ONCE
Refills: 0 | Status: DISCONTINUED | OUTPATIENT
Start: 2019-12-17 | End: 2019-12-18

## 2019-12-17 RX ORDER — DEXTROSE 50 % IN WATER 50 %
15 SYRINGE (ML) INTRAVENOUS ONCE
Refills: 0 | Status: DISCONTINUED | OUTPATIENT
Start: 2019-12-17 | End: 2019-12-18

## 2019-12-17 RX ORDER — POTASSIUM CHLORIDE 20 MEQ
20 PACKET (EA) ORAL DAILY
Refills: 0 | Status: DISCONTINUED | OUTPATIENT
Start: 2019-12-17 | End: 2019-12-17

## 2019-12-17 RX ORDER — PANTOPRAZOLE SODIUM 20 MG/1
40 TABLET, DELAYED RELEASE ORAL
Refills: 0 | Status: DISCONTINUED | OUTPATIENT
Start: 2019-12-17 | End: 2019-12-18

## 2019-12-17 RX ORDER — SODIUM CHLORIDE 9 MG/ML
3 INJECTION INTRAMUSCULAR; INTRAVENOUS; SUBCUTANEOUS EVERY 8 HOURS
Refills: 0 | Status: DISCONTINUED | OUTPATIENT
Start: 2019-12-17 | End: 2019-12-18

## 2019-12-17 RX ORDER — SODIUM CHLORIDE 9 MG/ML
1000 INJECTION, SOLUTION INTRAVENOUS
Refills: 0 | Status: DISCONTINUED | OUTPATIENT
Start: 2019-12-17 | End: 2019-12-18

## 2019-12-17 RX ORDER — FUROSEMIDE 40 MG
40 TABLET ORAL ONCE
Refills: 0 | Status: COMPLETED | OUTPATIENT
Start: 2019-12-17 | End: 2019-12-17

## 2019-12-17 RX ORDER — INSULIN LISPRO 100/ML
VIAL (ML) SUBCUTANEOUS
Refills: 0 | Status: DISCONTINUED | OUTPATIENT
Start: 2019-12-17 | End: 2019-12-18

## 2019-12-17 RX ORDER — GLUCAGON INJECTION, SOLUTION 0.5 MG/.1ML
1 INJECTION, SOLUTION SUBCUTANEOUS ONCE
Refills: 0 | Status: DISCONTINUED | OUTPATIENT
Start: 2019-12-17 | End: 2019-12-18

## 2019-12-17 RX ORDER — DEXTROSE 50 % IN WATER 50 %
12.5 SYRINGE (ML) INTRAVENOUS ONCE
Refills: 0 | Status: DISCONTINUED | OUTPATIENT
Start: 2019-12-17 | End: 2019-12-18

## 2019-12-17 RX ORDER — HYDROMORPHONE HYDROCHLORIDE 2 MG/ML
0.25 INJECTION INTRAMUSCULAR; INTRAVENOUS; SUBCUTANEOUS ONCE
Refills: 0 | Status: DISCONTINUED | OUTPATIENT
Start: 2019-12-17 | End: 2019-12-17

## 2019-12-17 RX ORDER — SIMVASTATIN 20 MG/1
20 TABLET, FILM COATED ORAL AT BEDTIME
Refills: 0 | Status: DISCONTINUED | OUTPATIENT
Start: 2019-12-17 | End: 2019-12-18

## 2019-12-17 RX ORDER — METOPROLOL TARTRATE 50 MG
25 TABLET ORAL
Refills: 0 | Status: DISCONTINUED | OUTPATIENT
Start: 2019-12-18 | End: 2019-12-18

## 2019-12-17 RX ADMIN — HYDROMORPHONE HYDROCHLORIDE 0.25 MILLIGRAM(S): 2 INJECTION INTRAMUSCULAR; INTRAVENOUS; SUBCUTANEOUS at 17:18

## 2019-12-17 RX ADMIN — HYDROMORPHONE HYDROCHLORIDE 0.25 MILLIGRAM(S): 2 INJECTION INTRAMUSCULAR; INTRAVENOUS; SUBCUTANEOUS at 17:03

## 2019-12-17 NOTE — CONSULT NOTE ADULT - ASSESSMENT
69y Male  s/p CABG x 3 on 12/3/19.  Seen in office to day with Dr. Pires for post op check up. He was found to have large left pleural effusion.

## 2019-12-17 NOTE — ED ADULT NURSE NOTE - CHIEF COMPLAINT QUOTE
from [facility] cardiology Dr Hardin. Has fluids in lungs, sent to ED for admission, to tap lungs. Dyspnea now, cough

## 2019-12-17 NOTE — CONSULT NOTE ADULT - PROBLEM SELECTOR RECOMMENDATION 9
left pigtail inserted so far 1500 cc's serosanguanous fluid drained  pain medicine given  Plan for xray  Reevaluate chest tube drainage in 1 hour.   If drainage has stopped will remove chest tube  Will then repeat xray and if lung field has improved may go home

## 2019-12-17 NOTE — ED ADULT NURSE NOTE - OBJECTIVE STATEMENT
from [facility] cardiology Dr Hardin. Has fluids in lungs, sent to ED for admission, to tap lungs. Dyspnea now, cough

## 2019-12-17 NOTE — ED PROVIDER NOTE - OBJECTIVE STATEMENT
Pt is a 69 y.o. M hx of recent CABG x4 vessel disease, DM, Hepatitis B, CAD with 4 stents placed in the past, DJD, HTN, GERD, HLD presenting with shortness of breath for five days. The pt states he had a CABG 12/3/19 with Dr. Hardin, he's currently on , Plavix, Metoprolol and Furosemide. He states he has been getting progressively shortness of breath for five days but it has been worse over the last 2-3 days causing him to cough. He had a chest x-ray today at Dr. Hardin's office where it was noted that he had a sizable pleural effusion. Denies chest pain, fevers, sweats, diaphoresis, nausea, back pain, abdominal pain, numbness, tingling or weakness.

## 2019-12-17 NOTE — CONSULT NOTE ADULT - SUBJECTIVE AND OBJECTIVE BOX
Surgeon:Pranay        HISTORY OF PRESENT ILLNESS:  69y Male  s/p CABG x 3 on 12/3/19.  Seen in office to day with Dr. Pires for post op check up.  While in the office he explained that he has been feeling SOB especially when walking.  He also has a cough with no sputum production.  Dr. Pires sent him for chest xray where it was shown to have a large left pleural effusion.  He was brought to the ED for evaluation and thoracentesis.  He denies fever, chills, dizziness, and palpatations.      PAST MEDICAL & SURGICAL HISTORY:  Osteoporosis  DJD (degenerative joint disease)  CAD (coronary artery disease)  HTN (hypertension)  GERD (gastroesophageal reflux disease)  Hyperlipidemia  Hepatitis B  Heartburn  Diabetes 1.5, managed as type 2  Arteriosclerosis  S/P CABG x 4: 12/03/19  Status post cataract extraction: left eye done on 10/2/2019  History of cardiac catheterization: with 4 stents 2003,2006,2012      MEDICATIONS  (STANDING):    MEDICATIONS  (PRN):      Allergies    NKA    Intolerances    Beef (Other (Mod to Severe))  Chicken (Other (Mod to Severe))  No Pork (Unknown)      SOCIAL HISTORY:  Smoker: [ ] Yes  [ ] No        PACK YEARS:                         WHEN QUIT?  ETOH use: [ ] Yes  [ ] No              FREQUENCY / QUANTITY:  Ilicit Drug use:  [ ] Yes  [ ] No  Occupation:  Live with:  Assisted device use:    FAMILY HISTORY:  FH: myocardial infarction (Grandparent)      Review of Systems  CONSTITUTIONAL:  Fevers[no ] chills[no ] sweats[no ] fatigue[yes ] weight loss[ ] weight gain [ ]                                     NEGATIVE [ ]   NEURO:  parathesias[ ] seizures [ ]  syncope [ ]  confusion [ ]                                                                                NEGATIVE[- ]   EYES: glasses[- ]  blurry vision[ ]  discharge[ ] pain[ ] glaucoma [ ]                                                                          NEGATIVE[ ]   ENMT:  difficulty hearing [ ]  vertigo[ ]  dysphagia[ ] epistaxis[ ] recent dental work [ ]   negative                                 NEGATIVE[ ]   CV:  chest pain[ ] palpitations[ ] DUBON [ ] diaphoresis [ ]                                                                                           NEGATIVE[- ]   RESPIRATORY:  wheezing[ ] SOB[yes ] cough [yes ] sputum[ ] hemoptysis[ ]                                                                  NEGATIVE[ ]   GI:  nausea[ ]  vommiting [ ]  diarrhea[ ] constipation [ ] melena [ ]                                                                      NEGATIVE[ ] negative  : hematuria[ ]  dysuria[ ] urgency[ ] incontinence[ ]                                                                                            NEGATIVE[- ]   MUSKULOSKELETAL:  arthritis[ ]  joint swelling [ ] muscle weakness [ ]                                                                NEGATIVE[ ] negative  SKIN/BREAST:  rash[ ] itching [ ]  hair loss[ ] masses[ ]                                                                                              NEGATIVE[ ]   PSYCH:  dementia [ ] depresion [ ] anxiety[ ]                                                                                                               NEGATIVE[ ]   HEME/LYMPH:  bruises easily[ ] enlarged lymph nodes[ ] tender lymph nodes[ ]                                               NEGATIVE[ ]   ENDOCRINE:  cold intolerance[ ] heat intolerance[ ] polydipsia[ ]                                                                          NEGATIVE[ ]     PHYSICAL EXAM  Vital Signs Last 24 Hrs  T(C): 36.6 (17 Dec 2019 13:50), Max: 36.6 (17 Dec 2019 13:50)  T(F): 97.8 (17 Dec 2019 13:50), Max: 97.8 (17 Dec 2019 13:50)  HR: 102 (17 Dec 2019 13:50) (102 - 102)  BP: 123/77 (17 Dec 2019 13:50) (123/77 - 123/77)  BP(mean): --  RR: 22 (17 Dec 2019 13:50) (22 - 22)  SpO2: 97% (17 Dec 2019 13:50) (97% - 97%)    CONSTITUTIONAL:                                                                          WNL[ ]   Neuro:  Normal exam oriented to person/place & time with no focal motor or sensory  deficits.        Eyes:   Normal exam of conjunctiva & lids, pupils equally reactive.  ENT: Normal exam of nasal/oral mucosa with absence of cyanosis.   Neck: Normal exam of jugular veins, trachea & thyroid.  Chest:Decreased lung sounds left side                                                                                 CV:  Auscultation: normal                                                                                       GI: Normal exam of abdomen, no noted masses or tenderness. Other______________________                                                                                                        Extremities: WNL[ ] Normal no evidence of cyanosis or deformity Edema:mild  Lower Extremity Pulses: +1 B/L DP   SKIN :warm Sternal incision healing well                                                          LABS:                        11.1   8.25  )-----------( 374      ( 17 Dec 2019 14:43 )             36.9     12-17    134<L>  |  98  |  15.0  ----------------------------<  233<H>  3.9   |  22.0  |  1.09    Ca    9.0      17 Dec 2019 14:43  Mg     2.0     12-17    TPro  7.7  /  Alb  3.7  /  TBili  0.8  /  DBili  x   /  AST  27  /  ALT  15  /  AlkPhos  99  12-17    PT/INR - ( 17 Dec 2019 14:43 )   PT: 14.2 sec;   INR: 1.23 ratio         PTT - ( 17 Dec 2019 14:43 )  PTT:29.8 sec    CARDIAC MARKERS ( 17 Dec 2019 14:43 )  x     / 0.01 ng/mL / x     / x     / x            Serum Pro-Brain Natriuretic Peptide: 689 pg/mL (12-17-19 @ 14:43)

## 2019-12-17 NOTE — ED PROVIDER NOTE - ATTENDING CONTRIBUTION TO CARE
I, Mario Carranza, personally saw the patient with the resident, and completed the key components of the history and physical exam. I then discussed the management plan with the resident.    70 yo M recent CABG x 4 2 weeks prior with  found to have large left sided pleural effusion. CT surgery consulted, recommend not to admit the patient. They placed left sided pigtail with 1.5L of serosanguinous fluid drianged. Repeat xray showed improvement and no pneumothorax. repeat cbc showed a slight decrease in Hb of 11.1 to 10.6. Upon reassessment, patient was tachypneic to 115 and hypoxic to 90%. CT surgery re-evaluated the patient and will admit to CT-ICU with stat echo and abg.

## 2019-12-17 NOTE — ED CLERICAL - NS ED CLERK NOTE PRE-ARRIVAL INFORMATION; ADDITIONAL PRE-ARRIVAL INFORMATION
This patient is enrolled in the Follow Your Heart program and has undergone a cardiac surgery procedure and has active care navigation. This patient can be followed up by the care navigation team within 24 hours. To arrange close follow-up or to obtain additional clinical information about this patient, please call the contact number above. Please call the cardiac surgery team once patient is registered at [phone] for consultation PRIOR to disposition decision.  The patient recently underwent a cardiac surgery procedure and the team can assist in acute medical management.

## 2019-12-17 NOTE — PROCEDURE NOTE - NSPROCDETAILS_GEN_ALL_CORE
thoracostomy tube placed percutaneously/sterile dressing applied/percutaneous/Seldinger technique/dressing applied/secured in place/ultrasound assessment of fluid (location)

## 2019-12-17 NOTE — ED PROVIDER NOTE - CLINICAL SUMMARY MEDICAL DECISION MAKING FREE TEXT BOX
Pt is a 69 y.o. M recent CABG, presenting with dyspnea. Not in respiratory distress, slightly diminished breath sounds LLL. Not tachypneic, mildly tachycardic to 103, afebrile, O2 sat > 93% on RA. Labs, EKG, CT surgery consulted, monitor vitals. Will follow up.

## 2019-12-17 NOTE — ED PROVIDER NOTE - PSH
History of cardiac catheterization  with 4 stents 2003,2006,2012  S/P CABG x 4  12/03/19  Status post cataract extraction  left eye done on 10/2/2019

## 2019-12-17 NOTE — CHART NOTE - NSCHARTNOTEFT_GEN_A_CORE
Chest tube removed without difficulty.  Post pull xray show no obvious pneumothorax.  Official read not complete.  If patient remains stable may be discharged home tonight.  He is to leave dressing over chest tube site for 48 hours than take off and shower normally.  He is to call Dr Pires's office  to schedule a follow up visit.  [phone].  Thank you. please call CT surgery for any concern.

## 2019-12-17 NOTE — ED PROVIDER NOTE - PROGRESS NOTE DETAILS
Aurelio Badillo, Resident: CT surgery consultation service called and made aware patient is present. Aurelio Badillo, Resident: CT surgery PA inserted chest tube with serosanguinous drainage. Pt feeling relief and less short of breath, vitals wnl. Aurelio Badillo, Resident: Chest tube out, pt is comfortable. Awaiting portable CXR and H-H. Aurelio Badillo, Resident: Pt tachycardic to 115, hypoxic to 90% on RA, started on 2L NC. Dr. Hardin, CT surgery attending, at bedside agrees with plan to repeat CXR. Aurelio Badillo, Resident: Spoke with CT surgery Usha HERNANDEZ who agreed to come and evaluate the patient and recommended ABG. Aurelio Badillo, Resident: Spoke with CT surgery service, agreed about admission, admitted to CT-ICU. Plan discussed with patient.

## 2019-12-18 ENCOUNTER — TRANSCRIPTION ENCOUNTER (OUTPATIENT)
Age: 69
End: 2019-12-18

## 2019-12-18 VITALS
HEART RATE: 105 BPM | DIASTOLIC BLOOD PRESSURE: 61 MMHG | OXYGEN SATURATION: 96 % | SYSTOLIC BLOOD PRESSURE: 111 MMHG | RESPIRATION RATE: 30 BRPM

## 2019-12-18 DIAGNOSIS — J96.01 ACUTE RESPIRATORY FAILURE WITH HYPOXIA: ICD-10-CM

## 2019-12-18 DIAGNOSIS — K21.9 GASTRO-ESOPHAGEAL REFLUX DISEASE WITHOUT ESOPHAGITIS: ICD-10-CM

## 2019-12-18 DIAGNOSIS — Z29.9 ENCOUNTER FOR PROPHYLACTIC MEASURES, UNSPECIFIED: ICD-10-CM

## 2019-12-18 DIAGNOSIS — E78.49 OTHER HYPERLIPIDEMIA: ICD-10-CM

## 2019-12-18 DIAGNOSIS — I25.810 ATHEROSCLEROSIS OF CORONARY ARTERY BYPASS GRAFT(S) WITHOUT ANGINA PECTORIS: ICD-10-CM

## 2019-12-18 DIAGNOSIS — I10 ESSENTIAL (PRIMARY) HYPERTENSION: ICD-10-CM

## 2019-12-18 DIAGNOSIS — E13.9 OTHER SPECIFIED DIABETES MELLITUS WITHOUT COMPLICATIONS: ICD-10-CM

## 2019-12-18 LAB
ALBUMIN SERPL ELPH-MCNC: 3.3 G/DL — SIGNIFICANT CHANGE UP (ref 3.3–5.2)
ALP SERPL-CCNC: 86 U/L — SIGNIFICANT CHANGE UP (ref 40–120)
ALT FLD-CCNC: 13 U/L — SIGNIFICANT CHANGE UP
ANION GAP SERPL CALC-SCNC: 14 MMOL/L — SIGNIFICANT CHANGE UP (ref 5–17)
ANION GAP SERPL CALC-SCNC: 17 MMOL/L — SIGNIFICANT CHANGE UP (ref 5–17)
AST SERPL-CCNC: 21 U/L — SIGNIFICANT CHANGE UP
BILIRUB SERPL-MCNC: 0.9 MG/DL — SIGNIFICANT CHANGE UP (ref 0.4–2)
BUN SERPL-MCNC: 13 MG/DL — SIGNIFICANT CHANGE UP (ref 8–20)
BUN SERPL-MCNC: 15 MG/DL — SIGNIFICANT CHANGE UP (ref 8–20)
CALCIUM SERPL-MCNC: 8.2 MG/DL — LOW (ref 8.6–10.2)
CALCIUM SERPL-MCNC: 8.8 MG/DL — SIGNIFICANT CHANGE UP (ref 8.6–10.2)
CHLORIDE SERPL-SCNC: 98 MMOL/L — SIGNIFICANT CHANGE UP (ref 98–107)
CHLORIDE SERPL-SCNC: 99 MMOL/L — SIGNIFICANT CHANGE UP (ref 98–107)
CO2 SERPL-SCNC: 19 MMOL/L — LOW (ref 22–29)
CO2 SERPL-SCNC: 20 MMOL/L — LOW (ref 22–29)
CREAT SERPL-MCNC: 0.9 MG/DL — SIGNIFICANT CHANGE UP (ref 0.5–1.3)
CREAT SERPL-MCNC: 0.96 MG/DL — SIGNIFICANT CHANGE UP (ref 0.5–1.3)
GLUCOSE BLDC GLUCOMTR-MCNC: 147 MG/DL — HIGH (ref 70–99)
GLUCOSE BLDC GLUCOMTR-MCNC: 155 MG/DL — HIGH (ref 70–99)
GLUCOSE BLDC GLUCOMTR-MCNC: 170 MG/DL — HIGH (ref 70–99)
GLUCOSE SERPL-MCNC: 178 MG/DL — HIGH (ref 70–115)
GLUCOSE SERPL-MCNC: 182 MG/DL — HIGH (ref 70–115)
HCT VFR BLD CALC: 35.3 % — LOW (ref 39–50)
HCT VFR BLD CALC: 37 % — LOW (ref 39–50)
HGB BLD-MCNC: 10.9 G/DL — LOW (ref 13–17)
HGB BLD-MCNC: 11.5 G/DL — LOW (ref 13–17)
MAGNESIUM SERPL-MCNC: 2.1 MG/DL — SIGNIFICANT CHANGE UP (ref 1.6–2.6)
MAGNESIUM SERPL-MCNC: 2.3 MG/DL — SIGNIFICANT CHANGE UP (ref 1.6–2.6)
MCHC RBC-ENTMCNC: 26.7 PG — LOW (ref 27–34)
MCHC RBC-ENTMCNC: 27 PG — SIGNIFICANT CHANGE UP (ref 27–34)
MCHC RBC-ENTMCNC: 30.9 GM/DL — LOW (ref 32–36)
MCHC RBC-ENTMCNC: 31.1 GM/DL — LOW (ref 32–36)
MCV RBC AUTO: 86.5 FL — SIGNIFICANT CHANGE UP (ref 80–100)
MCV RBC AUTO: 86.9 FL — SIGNIFICANT CHANGE UP (ref 80–100)
NT-PROBNP SERPL-SCNC: 781 PG/ML — HIGH (ref 0–300)
PLATELET # BLD AUTO: 360 K/UL — SIGNIFICANT CHANGE UP (ref 150–400)
PLATELET # BLD AUTO: 379 K/UL — SIGNIFICANT CHANGE UP (ref 150–400)
POTASSIUM SERPL-MCNC: 3.4 MMOL/L — LOW (ref 3.5–5.3)
POTASSIUM SERPL-MCNC: 4.9 MMOL/L — SIGNIFICANT CHANGE UP (ref 3.5–5.3)
POTASSIUM SERPL-SCNC: 3.4 MMOL/L — LOW (ref 3.5–5.3)
POTASSIUM SERPL-SCNC: 4.9 MMOL/L — SIGNIFICANT CHANGE UP (ref 3.5–5.3)
PROT SERPL-MCNC: 7.2 G/DL — SIGNIFICANT CHANGE UP (ref 6.6–8.7)
RAPID RVP RESULT: SIGNIFICANT CHANGE UP
RBC # BLD: 4.08 M/UL — LOW (ref 4.2–5.8)
RBC # BLD: 4.26 M/UL — SIGNIFICANT CHANGE UP (ref 4.2–5.8)
RBC # FLD: 16.2 % — HIGH (ref 10.3–14.5)
RBC # FLD: 16.3 % — HIGH (ref 10.3–14.5)
SODIUM SERPL-SCNC: 132 MMOL/L — LOW (ref 135–145)
SODIUM SERPL-SCNC: 135 MMOL/L — SIGNIFICANT CHANGE UP (ref 135–145)
WBC # BLD: 7.16 K/UL — SIGNIFICANT CHANGE UP (ref 3.8–10.5)
WBC # BLD: 8.16 K/UL — SIGNIFICANT CHANGE UP (ref 3.8–10.5)
WBC # FLD AUTO: 7.16 K/UL — SIGNIFICANT CHANGE UP (ref 3.8–10.5)
WBC # FLD AUTO: 8.16 K/UL — SIGNIFICANT CHANGE UP (ref 3.8–10.5)

## 2019-12-18 PROCEDURE — 85027 COMPLETE CBC AUTOMATED: CPT

## 2019-12-18 PROCEDURE — 85730 THROMBOPLASTIN TIME PARTIAL: CPT

## 2019-12-18 PROCEDURE — 80053 COMPREHEN METABOLIC PANEL: CPT

## 2019-12-18 PROCEDURE — 82962 GLUCOSE BLOOD TEST: CPT

## 2019-12-18 PROCEDURE — 93010 ELECTROCARDIOGRAM REPORT: CPT

## 2019-12-18 PROCEDURE — 83880 ASSAY OF NATRIURETIC PEPTIDE: CPT

## 2019-12-18 PROCEDURE — 80048 BASIC METABOLIC PNL TOTAL CA: CPT

## 2019-12-18 PROCEDURE — 82330 ASSAY OF CALCIUM: CPT

## 2019-12-18 PROCEDURE — 87486 CHLMYD PNEUM DNA AMP PROBE: CPT

## 2019-12-18 PROCEDURE — 84484 ASSAY OF TROPONIN QUANT: CPT

## 2019-12-18 PROCEDURE — 36415 COLL VENOUS BLD VENIPUNCTURE: CPT

## 2019-12-18 PROCEDURE — 82803 BLOOD GASES ANY COMBINATION: CPT

## 2019-12-18 PROCEDURE — 93306 TTE W/DOPPLER COMPLETE: CPT

## 2019-12-18 PROCEDURE — 96374 THER/PROPH/DIAG INJ IV PUSH: CPT | Mod: XU

## 2019-12-18 PROCEDURE — 87581 M.PNEUMON DNA AMP PROBE: CPT

## 2019-12-18 PROCEDURE — 99232 SBSQ HOSP IP/OBS MODERATE 35: CPT | Mod: 24

## 2019-12-18 PROCEDURE — 71045 X-RAY EXAM CHEST 1 VIEW: CPT | Mod: 26

## 2019-12-18 PROCEDURE — C1729: CPT

## 2019-12-18 PROCEDURE — 84132 ASSAY OF SERUM POTASSIUM: CPT

## 2019-12-18 PROCEDURE — 85014 HEMATOCRIT: CPT

## 2019-12-18 PROCEDURE — 82435 ASSAY OF BLOOD CHLORIDE: CPT

## 2019-12-18 PROCEDURE — 82947 ASSAY GLUCOSE BLOOD QUANT: CPT

## 2019-12-18 PROCEDURE — 87633 RESP VIRUS 12-25 TARGETS: CPT

## 2019-12-18 PROCEDURE — 71045 X-RAY EXAM CHEST 1 VIEW: CPT | Mod: 26,77

## 2019-12-18 PROCEDURE — 87798 DETECT AGENT NOS DNA AMP: CPT

## 2019-12-18 PROCEDURE — 83605 ASSAY OF LACTIC ACID: CPT

## 2019-12-18 PROCEDURE — 99285 EMERGENCY DEPT VISIT HI MDM: CPT | Mod: 25

## 2019-12-18 PROCEDURE — 84295 ASSAY OF SERUM SODIUM: CPT

## 2019-12-18 PROCEDURE — 32556 INSERT CATH PLEURA W/O IMAGE: CPT

## 2019-12-18 PROCEDURE — 71046 X-RAY EXAM CHEST 2 VIEWS: CPT

## 2019-12-18 PROCEDURE — 93005 ELECTROCARDIOGRAM TRACING: CPT

## 2019-12-18 PROCEDURE — 83735 ASSAY OF MAGNESIUM: CPT

## 2019-12-18 PROCEDURE — 85610 PROTHROMBIN TIME: CPT

## 2019-12-18 PROCEDURE — 97163 PT EVAL HIGH COMPLEX 45 MIN: CPT

## 2019-12-18 PROCEDURE — 71045 X-RAY EXAM CHEST 1 VIEW: CPT

## 2019-12-18 RX ORDER — INSULIN GLARGINE 100 [IU]/ML
20 INJECTION, SOLUTION SUBCUTANEOUS AT BEDTIME
Refills: 0 | Status: DISCONTINUED | OUTPATIENT
Start: 2019-12-18 | End: 2019-12-18

## 2019-12-18 RX ORDER — CLOPIDOGREL BISULFATE 75 MG/1
75 TABLET, FILM COATED ORAL DAILY
Refills: 0 | Status: DISCONTINUED | OUTPATIENT
Start: 2019-12-18 | End: 2019-12-18

## 2019-12-18 RX ORDER — POTASSIUM CHLORIDE 20 MEQ
40 PACKET (EA) ORAL EVERY 4 HOURS
Refills: 0 | Status: COMPLETED | OUTPATIENT
Start: 2019-12-18 | End: 2019-12-18

## 2019-12-18 RX ORDER — FUROSEMIDE 40 MG
40 TABLET ORAL
Refills: 0 | Status: DISCONTINUED | OUTPATIENT
Start: 2019-12-18 | End: 2019-12-18

## 2019-12-18 RX ORDER — ASPIRIN/CALCIUM CARB/MAGNESIUM 324 MG
325 TABLET ORAL DAILY
Refills: 0 | Status: DISCONTINUED | OUTPATIENT
Start: 2019-12-18 | End: 2019-12-18

## 2019-12-18 RX ADMIN — Medication 25 MILLIGRAM(S): at 17:32

## 2019-12-18 RX ADMIN — Medication 2: at 13:20

## 2019-12-18 RX ADMIN — Medication 40 MILLIEQUIVALENT(S): at 08:28

## 2019-12-18 RX ADMIN — SODIUM CHLORIDE 3 MILLILITER(S): 9 INJECTION INTRAMUSCULAR; INTRAVENOUS; SUBCUTANEOUS at 05:16

## 2019-12-18 RX ADMIN — Medication 40 MILLIGRAM(S): at 13:49

## 2019-12-18 RX ADMIN — Medication 40 MILLIGRAM(S): at 00:49

## 2019-12-18 RX ADMIN — Medication 2: at 05:31

## 2019-12-18 RX ADMIN — Medication 325 MILLIGRAM(S): at 09:51

## 2019-12-18 RX ADMIN — PANTOPRAZOLE SODIUM 40 MILLIGRAM(S): 20 TABLET, DELAYED RELEASE ORAL at 05:31

## 2019-12-18 RX ADMIN — Medication 25 MILLIGRAM(S): at 05:31

## 2019-12-18 RX ADMIN — CLOPIDOGREL BISULFATE 75 MILLIGRAM(S): 75 TABLET, FILM COATED ORAL at 09:51

## 2019-12-18 NOTE — H&P ADULT - NSHPSOCIALHISTORY_GEN_ALL_CORE
Non smoker.  Denies ETOH or illicit drug use.  Lives with his son and daughter in law.  Denies use of assistive devices.

## 2019-12-18 NOTE — H&P ADULT - PROBLEM SELECTOR PLAN 2
Left pleural effusion s/p pigtail catheter placement with >1.5L drained. Left pigtail catheter removed 12/17.

## 2019-12-18 NOTE — H&P ADULT - NSHPPHYSICALEXAM_GEN_ALL_CORE
Constitutional: Lying in bed with head elevated, well developed, well nourished  Neuro: A+O x 3, non-focal, speech clear and intact  HEENT: NC/AT, PERRL, EOMI, anicteric sclerae, oral mucosa pink and moist  Neck: supple, no JVD  CV: tachy rate, regular rhythm, +S1S2, no murmurs or rub  Pulm/chest: LLL with rales/crackles, right lung CTA, no accessory muscle use noted, on 3L O2 via NC in NAD, SpO2 low 90s, mildly tachypneic  Abd: soft, NT, ND, +BS  Ext: MACK x 4, no C/C/E  Skin: warm, well perfused  Psych: calm, appropriate affect

## 2019-12-18 NOTE — DISCHARGE NOTE PROVIDER - NSDCACTIVITY_GEN_ALL_CORE
Walking - Outdoors allowed/Do not drive or operate machinery/Showering allowed/No heavy lifting/straining/Walking - Indoors allowed/Stairs allowed

## 2019-12-18 NOTE — H&P ADULT - NEGATIVE NEUROLOGICAL SYMPTOMS
no headache/no facial palsy/no loss of sensation/no hemiparesis/no paresthesias/no confusion/no syncope/no loss of consciousness/no transient paralysis

## 2019-12-18 NOTE — PATIENT PROFILE ADULT - LAST ORAL INTAKE
Ventricular Rate : 92   Atrial Rate : 92   P-R Interval : 126   QRS Duration : 74   Q-T Interval : 370   QTC Calculation(Bezet) : 457   P Axis : 55   R Axis : 9   T Axis : 41   Diagnosis : Normal sinus rhythm~Minimal voltage criteria for LVH, may be normal variant~Borderline ECG~~Confirmed by STVEEN RAPP MASOOD ([id number]) on 6/26/2017 7:46:55 AM     
17-Dec-2019 08:30

## 2019-12-18 NOTE — PHYSICAL THERAPY INITIAL EVALUATION ADULT - ADDITIONAL COMMENTS
Pt reports lives in 1 story house with wife, daughters, and granddaughters, 1 step to enter home. Pt owns RW at home but does not currently use.

## 2019-12-18 NOTE — H&P ADULT - NSICDXFAMILYHX_GEN_ALL_CORE_FT
FAMILY HISTORY:  Grandparent  Still living? Unknown  FH: myocardial infarction, Age at diagnosis: Age Unknown

## 2019-12-18 NOTE — DISCHARGE NOTE PROVIDER - NSDCFUADDINST_GEN_ALL_CORE_FT
Please call the Cardiothoracic Surgery office at [phone] if you are experiencing any shortness of breath, chest pain, fevers or chills, drainage from the incisions, persistent nausea, vomiting or if you have any questions about your medications. If the symptoms are severe, call 911 and go to the nearest hospital. You can also call [phone] for an emergency [facility] ambulance, which will take you to the closest [facility].    If you need any assistance for making any appointments for a new consult or referral in any specialty, please call our [facility] Clinical Coordination Center at [phone].

## 2019-12-18 NOTE — DISCHARGE NOTE PROVIDER - NSDCCPCAREPLAN_GEN_ALL_CORE_FT
PRINCIPAL DISCHARGE DIAGNOSIS  Diagnosis: Pleural effusion  Assessment and Plan of Treatment: Effusion drained, CT removed, continue lasix daily at home

## 2019-12-18 NOTE — DISCHARGE NOTE PROVIDER - HOSPITAL COURSE
12/17 c/o SOB on postop visit in office, (s/p CABG  on 12/3, discharged home on 12/7 after uneventful course) , pt was sent to ED, large left effusion, s/p pigtail 1600cc old blood, pigtail removed, pending d/c home from ED, developed worsening SOB, hypoxia, ST, repeat CXR with ? re-expansion pulm edema, Lasix 40IV x1 admitted to ICU for observation.  Patient has been diuresing aggressively last 24 hours and ambulated well with Physical Therapy today.

## 2019-12-18 NOTE — DISCHARGE NOTE PROVIDER - CARE PROVIDER_API CALL
Kenneth Pires)  CTS Surgery  [address] [place] [address]  Phone: [phone]  Fax: [phone]  Follow Up Time: 1 week

## 2019-12-18 NOTE — PHYSICAL THERAPY INITIAL EVALUATION ADULT - PERTINENT HX OF CURRENT PROBLEM, REHAB EVAL
70 y/o male came in from [facility] Cardiology with tachypnea and tachycardiac, pleural effusion

## 2019-12-18 NOTE — H&P ADULT - ASSESSMENT
69 year old male PMH CAD, s/p stents x4 ('03, '06, '12), GERD, HLD, DM (A1c 10.3), osteoporosis, fatty liver, recent +NSTEMI with Left heart cath showing multi-vessel CAD now s/p CABG X 4 with right saphenous vein harvest on 12/3/19 by Dr. Pires. Immediate post-op course notable for acute blood loss anemia and coagulopathy which improved after multiple blood products. Patient was seen in the office earlier 12/17 by Dr. Pires with c/o shortness of breath with left pleural effusion seen on CXR. Left pigtail catheter placed 12/17 with over 1.5L drained. Chest tube was removed later the same day due to resolution of SOB and minimal output from the tube. Patient was noted to be progressively short of breath and tachycardic after chest tube was removed and decision was made to admit patient.

## 2019-12-18 NOTE — H&P ADULT - NSHPLABSRESULTS_GEN_ALL_CORE
10.6   9.72  )-----------( 366      ( 17 Dec 2019 20:49 )             34.6     12-17    134<L>  |  98  |  15.0  ----------------------------<  233<H>  3.9   |  22.0  |  1.09    Ca    9.0      17 Dec 2019 14:43  Mg     2.0     12-17    TPro  7.7  /  Alb  3.7  /  TBili  0.8  /  DBili  x   /  AST  27  /  ALT  15  /  AlkPhos  99  12-17    PT/INR - ( 17 Dec 2019 14:43 )   PT: 14.2 sec;   INR: 1.23 ratio         PTT - ( 17 Dec 2019 14:43 )  PTT:29.8 sec    ABG - ( 17 Dec 2019 23:20 )  pH, Arterial: 7.48  pH, Blood: x     /  pCO2: 29    /  pO2: 73    / HCO3: 24    / Base Excess: -1.1  /  SaO2: 96          < from: 12 Lead ECG (12.17.19 @ 14:06) >  Ventricular Rate 101 BPM  Atrial Rate 101 BPM  P-R Interval 146 ms  QRS Duration 80 ms  Q-T Interval 354 ms  QTC Calculation(Bezet) 459 ms  P Axis 54 degrees  R Axis 4 degrees  T Axis 185 degrees  Diagnosis Line Sinus tachycardia  Possible Left atrial enlargement  T wave abnormality, consider inferolateral ischemia  Abnormal ECG  < end of copied text >      < from: Xray Chest 2 Views PA/Lat (12.17.19 @ 13:42) >  IMPRESSION:  Large LEFT pleural effusion and/or airspace consolidation and/or layering   to the LEFT posterior seventh rib.  < end of copied text >      < from: TTE Echo Complete w/Doppler (12.17.19 @ 23:30) >  PHYSICIAN INTERPRETATION:  Left Ventricle: The left ventricular internal cavity size is normal.  Global LV systolic function was normal. Left ventricular ejection fraction, by visual estimation, is 70 to 75%.  Right Ventricle: The right ventricle was not well visualized. The right ventricular size is normal. RV systolic function is normal.  Pericardium: Trivial pericardial effusion is present. The pericardial effusion is posterior to the left ventricle and anterior to the right ventricle.  Venous: The inferior vena cava is normal. The inferior vena cava was normal sized, with respiratory size variation greater than 50%.  Summary:   1. Left ventricular ejection fraction, by visual estimation, is 70 to 75%.   2. Normal global left ventricular systolic function.  < end of copied text >

## 2019-12-18 NOTE — DISCHARGE NOTE PROVIDER - NSDCFUSCHEDAPPT_GEN_ALL_CORE_FT
EMMA TAPIA ; 12/20/2019 ; [facility]  EMMA TAPIA ; 12/23/2019 ; NPP Endocrin [address] [facility] EMMA TAPIA ; 12/20/2019 ; [facility]  EMMA TAPIA ; 12/23/2019 ; NPP Endocrin [address] [facility] EMMA TAPIA ; 12/20/2019 ; [facility]  EMMA TAPIA ; 12/23/2019 ; NPP Endocrin [address] [facility] EMMA TAPIA ; 12/20/2019 ; [facility]  EMMA TAPIA ; 12/23/2019 ; NPP Endocrin [address] [facility]

## 2019-12-18 NOTE — H&P ADULT - NSICDXPASTSURGICALHX_GEN_ALL_CORE_FT
PAST SURGICAL HISTORY:  History of cardiac catheterization with 4 stents 2003,2006,2012    S/P CABG x 4 12/03/19    Status post cataract extraction left eye done on 10/2/2019

## 2019-12-18 NOTE — DISCHARGE NOTE PROVIDER - NSDCFUADDAPPT_GEN_ALL_CORE_FT
Please follow up with Dr. Pires early next week. Please follow up with Dr. Pires early next week.    Please obtain Chest X-Ray prior to appt.

## 2019-12-18 NOTE — H&P ADULT - PROBLEM SELECTOR PLAN 8
SCDs for DVT prophylaxis, Lovenox to be started later today if cleared by surgeon  Protonix for GI prophylaxis

## 2019-12-18 NOTE — DISCHARGE NOTE PROVIDER - NSDCMRMEDTOKEN_GEN_ALL_CORE_FT
acetaminophen 325 mg oral tablet: 2 tab(s) orally every 6 hours, As needed, Temp greater or equal to 38C (100.4F), Mild Pain (1 - 3)  aspirin 325 mg oral delayed release tablet: 1 tab(s) orally once a day  Basaglar KwikPen 100 units/mL subcutaneous solution: 20 unit(s) subcutaneous once a day (at bedtime)   clopidogrel 75 mg oral tablet: 1 tab(s) orally once a day  furosemide 40 mg oral tablet: 1 tab(s) orally once a day  metoprolol tartrate 25 mg oral tablet: 1 tab(s) orally every 12 hours   pantoprazole 40 mg oral delayed release tablet: 1 tab(s) orally once a day  polyethylene glycol 3350 oral powder for reconstitution: 17 gram(s) orally once a day  potassium chloride 20 mEq oral tablet, extended release: 1 tab(s) orally once a day  senna oral tablet: 2 tab(s) orally once a day (at bedtime)  simvastatin 20 mg oral tablet: 1 tab(s) orally once a day (at bedtime)

## 2019-12-18 NOTE — PHYSICAL THERAPY INITIAL EVALUATION ADULT - GENERAL OBSERVATIONS, REHAB EVAL
Pt received sitting in chair, (+) cardiac monitor, (+) IV lock Left UE, NAD, agreeable to PT, family present at bedside

## 2019-12-18 NOTE — H&P ADULT - PROBLEM SELECTOR PLAN 1
Admitted to CTICU service with tachypnea and tachycardia after >1.5L drained from Left pleural effusion.   CXR prior to admission shows increased vascular congestion in the left lung possibly secondary to delayed re-expansion pulmonary edema.   ABG as above  40 IV lasix given, continue to dose lasix PRN  Monitor strict I/Os  Titrate O2 requirements as able to keep SpO2 >92  SpO2 stable currently on 3L O2 via NC  Stat TTE as above  Continuous telemetry and O2 monitoring  F/u am CXR  Case and plan discussed with Dr. Pires earlier. Case and plan to be discussed further with CTS team at am rounds.

## 2019-12-18 NOTE — DISCHARGE NOTE PROVIDER - NSDCCPTREATMENT_GEN_ALL_CORE_FT
PRINCIPAL PROCEDURE  Procedure: Drainage of pleural effusion  Findings and Treatment: CT inserted, effusion drained, resolved      SECONDARY PROCEDURE  Procedure: 2D echo  Findings and Treatment: No new significant findings

## 2019-12-20 ENCOUNTER — APPOINTMENT (OUTPATIENT)
Dept: CARDIOLOGY | Facility: CLINIC | Age: 69
End: 2019-12-20
Payer: MEDICARE

## 2019-12-20 ENCOUNTER — NON-APPOINTMENT (OUTPATIENT)
Age: 69
End: 2019-12-20

## 2019-12-20 VITALS
HEART RATE: 97 BPM | SYSTOLIC BLOOD PRESSURE: 107 MMHG | HEIGHT: 70 IN | BODY MASS INDEX: 30.06 KG/M2 | DIASTOLIC BLOOD PRESSURE: 68 MMHG | OXYGEN SATURATION: 97 % | WEIGHT: 210 LBS

## 2019-12-20 PROCEDURE — 99214 OFFICE O/P EST MOD 30 MIN: CPT

## 2019-12-20 PROCEDURE — 93000 ELECTROCARDIOGRAM COMPLETE: CPT

## 2019-12-20 NOTE — DISCUSSION/SUMMARY
[___ Month(s)] : [unfilled] month(s) [With ___] : with [unfilled] [FreeTextEntry1] : CAD/CABG: Pleural effusion being managed by CT surgery with fu next week.\par Hyperlipidemia: Will have blood work with endo and after completed forward a coy\par Diabetes mellitus: Reports good results when checked recently. \par F/u in 2 weeks. Discussed rehab in near future.

## 2019-12-20 NOTE — ED ADULT TRIAGE NOTE - DIRECT TO ROOM CARE INITIATED:
Daily Note     Today's date: 2019  Patient name: Jessica Underwood  : 1967  MRN: [id number]  Referring provider: Erica Henderson DO  Dx:   Encounter Diagnosis     ICD-10-CM    1  Myofascial pain with referred pain M79 18    2  Acute pain of right shoulder M25 511    3  Impingement syndrome of right shoulder M75 41                   Subjective:Patient states she got her injection yesterday, which she has not noticed an improvement yet, but she has minimal pain at this time  Objective: See treatment diary below      Assessment: Continued with outlined program  She demonstrates improved tolerance to UE strengthening exercises  Will continue with HEP  Plan: Discharge to [facility]       Diagnosis: R shoulder impingement with upper trap compensation   Precautions: Depression   Manuals     PROM KLS KLS CMF KLS    Mobs   CMF     IASTM        Re-eval        Scap retraction taping         Exercise Diary        UBE 2 5/2 5 2 5/2 5 2 5/2 5 2 5/2 5    SL IR  1# 3x10 3# 3x10 3#3x10    SL ER 1# 3x10 2# 2x10  2# 3x10 2# 3x10    Bent over H-abd 0# 2x10  0# 3x10 0# 3x12 0# 3x12    Bent over Rows 5# 3x10 6# 3x10 6# 3x10 6# 3x10    Bent over Lower trap 0# 2x10  0# 3x10  0# 3x12 0# 3x12    Bent over Lats 3# 3x10  4# 3x10 5# 2x10 5# 3x10    Supine Punch 6# :03x20  7# :03x20  7# :03x20 7# 2x10    Scap Stab with towel on wall        Corner S        C/s DNF --       Scap stab with Tband RTB :20x4  RTB :30x4  RTB :30x4 RTB :30x4    90/90 ER   0# 1x10 0# 1x10                                                Modalities        CP and IFC PRN - shoulder/uppper trap def def                             
PT Discharge    Today's date: 2019  Patient name: Gal Whitehead  : 1967  MRN: [id number]  Referring provider: Linda Sandoval DO  Dx:   Encounter Diagnosis     ICD-10-CM    1  Myofascial pain with referred pain M79 18    2  Acute pain of right shoulder M25 511    3   Impingement syndrome of right shoulder M75 41        Start Time: 0700  Stop Time: 0740  Total time in clinic (min): 40 minutes    Assessment/Plan    Subjective    Objective    Flowsheet Rows      Most Recent Value   PT/OT G-Codes   Current Score  86   Projected Score  67             Pt has been d/c from PT 
17-Dec-2019 13:52

## 2019-12-20 NOTE — REASON FOR VISIT
[Coronary Artery Disease] : coronary artery disease [Dyspnea] : dyspnea [Follow-Up - Clinic] : a clinic follow-up of [Medication Management] : Medication management

## 2019-12-20 NOTE — PHYSICAL EXAM
[General Appearance - Well Developed] : well developed [Well Groomed] : well groomed [General Appearance - Well Nourished] : well nourished [General Appearance - In No Acute Distress] : no acute distress [Normal Oral Mucosa] : normal oral mucosa [No Oral Pallor] : no oral pallor [Normal Conjunctiva] : the conjunctiva exhibited no abnormalities [Normal Jugular Venous V Waves Present] : normal jugular venous V waves present [Normal Jugular Venous A Waves Present] : normal jugular venous A waves present [Bowel Sounds] : normal bowel sounds [FreeTextEntry1] : Decreased BS over left lower lung field. CTAP elsewhere.   [Abnormal Walk] : normal gait [Abdomen Soft] : soft [] : no rash [Cyanosis, Localized] : no localized cyanosis [Nail Clubbing] : no clubbing of the fingernails [Oriented To Time, Place, And Person] : oriented to person, place, and time [No Anxiety] : not feeling anxious [Affect] : the affect was normal [Normal] : normal [5th Left ICS - MCL] : palpated at the 5th LICS in the midclavicular line [Rhythm Regular] : regular [Normal S1] : normal S1 [Normal S2] : normal S2 [I] : a grade 1 [No Pitting Edema] : no pitting edema present [2+] : left 2+

## 2019-12-20 NOTE — REVIEW OF SYSTEMS
[Chills] : no chills [Fever] : no fever [Feeling Fatigued] : not feeling fatigued [Chest  Pressure] : no chest pressure [Dyspnea on exertion] : dyspnea during exertion [Palpitations] : no palpitations [Chest Pain] : no chest pain [Cough] : no cough

## 2019-12-20 NOTE — HISTORY OF PRESENT ILLNESS
[FreeTextEntry1] : Stable angina, and subsequently had cardiac catheterization, which revealed triple vessel coronary artery disease, and he was transferred to [facility] and had bypass surgery. Has recovered nicely except he developed a left pleural effusion, which at the beginning of this week was drained and he has since been discharged. He is followup with cardiothoracic on Monday to monitor this. Denies chest pain. Still has a persistent cough, which seems to be related to the pleural effusion as it improves with sitting up and worsens when he lies flat. Denies orthopnea, lower extremity edema, palpitations, or presyncopal symptoms.

## 2019-12-20 NOTE — CARDIOLOGY SUMMARY
[Normal] : normal [No Ischemia] : no Ischemia [None] : normal LV function [LVEF ___%] : LVEF [unfilled]% [___] : [unfilled]

## 2019-12-23 ENCOUNTER — OUTPATIENT (OUTPATIENT)
Dept: OUTPATIENT SERVICES | Facility: HOSPITAL | Age: 69
LOS: 1 days | End: 2019-12-23
Payer: MEDICARE

## 2019-12-23 ENCOUNTER — APPOINTMENT (OUTPATIENT)
Dept: CARDIOTHORACIC SURGERY | Facility: CLINIC | Age: 69
End: 2019-12-23
Payer: MEDICARE

## 2019-12-23 ENCOUNTER — APPOINTMENT (OUTPATIENT)
Dept: ENDOCRINOLOGY | Facility: CLINIC | Age: 69
End: 2019-12-23
Payer: MEDICARE

## 2019-12-23 VITALS
BODY MASS INDEX: 30.51 KG/M2 | SYSTOLIC BLOOD PRESSURE: 90 MMHG | HEIGHT: 69 IN | WEIGHT: 206 LBS | HEART RATE: 77 BPM | DIASTOLIC BLOOD PRESSURE: 60 MMHG

## 2019-12-23 VITALS
HEART RATE: 85 BPM | OXYGEN SATURATION: 98 % | TEMPERATURE: 97.6 F | BODY MASS INDEX: 29.92 KG/M2 | DIASTOLIC BLOOD PRESSURE: 75 MMHG | SYSTOLIC BLOOD PRESSURE: 115 MMHG | RESPIRATION RATE: 18 BRPM | WEIGHT: 202 LBS | HEIGHT: 69 IN

## 2019-12-23 DIAGNOSIS — Z98.890 OTHER SPECIFIED POSTPROCEDURAL STATES: Chronic | ICD-10-CM

## 2019-12-23 DIAGNOSIS — Z95.1 PRESENCE OF AORTOCORONARY BYPASS GRAFT: Chronic | ICD-10-CM

## 2019-12-23 DIAGNOSIS — Z98.49 CATARACT EXTRACTION STATUS, UNSPECIFIED EYE: Chronic | ICD-10-CM

## 2019-12-23 DIAGNOSIS — J90 PLEURAL EFFUSION, NOT ELSEWHERE CLASSIFIED: ICD-10-CM

## 2019-12-23 PROCEDURE — 99214 OFFICE O/P EST MOD 30 MIN: CPT

## 2019-12-23 PROCEDURE — 71046 X-RAY EXAM CHEST 2 VIEWS: CPT | Mod: 26

## 2019-12-23 PROCEDURE — 71046 X-RAY EXAM CHEST 2 VIEWS: CPT

## 2019-12-23 PROCEDURE — 99024 POSTOP FOLLOW-UP VISIT: CPT

## 2019-12-23 RX ORDER — OXYCODONE AND ACETAMINOPHEN 5; 325 MG/1; MG/1
5-325 TABLET ORAL
Refills: 0 | Status: COMPLETED | COMMUNITY
End: 2019-12-23

## 2019-12-23 RX ORDER — PANTOPRAZOLE SODIUM 40 MG/1
40 TABLET, DELAYED RELEASE ORAL
Refills: 0 | Status: COMPLETED | COMMUNITY
End: 2019-12-23

## 2019-12-23 NOTE — REVIEW OF SYSTEMS
[Fatigue] : fatigue [Recent Weight Loss (___ Lbs)] : recent [unfilled] ~Ulb weight loss [Blurry Vision] : blurred vision [Dysphagia] : dysphagia [Neck Pain] : neck pain [Nocturia] : nocturia [Dry Skin] : dry skin [Visual Field Defect] : no visual field defect [Chest Pain] : no chest pain [Palpitations] : no palpitations [Shortness Of Breath] : no shortness of breath [Wheezing] : no wheezing was heard [Nausea] : no nausea [Polyuria] : no polyuria [Vomiting] : no vomiting was observed [Acanthosis] : no acanthosis  [Joint Pain] : no joint pain [Joint Stiffness] : no joint stiffness [Headache] : no headaches [Tremors] : no tremors [Depression] : no depression [Anxiety] : no anxiety [Polydipsia] : no polydipsia [Cold Intolerance] : cold tolerant [Heat Intolerance] : heat tolerant

## 2019-12-23 NOTE — PHYSICAL EXAM
[Alert] : alert [No Acute Distress] : no acute distress [Well Nourished] : well nourished [Well Developed] : well developed [Normal Hearing] : hearing was normal [Normal Sclera/Conjunctiva] : normal sclera/conjunctiva [Supple] : the neck was supple [Normal Rate and Effort] : normal respiratory rhythm and effort [No Accessory Muscle Use] : no accessory muscle use [Clear to Auscultation] : lungs were clear to auscultation bilaterally [Normal Rate] : heart rate was normal  [Normal S1, S2] : normal S1 and S2 [Regular Rhythm] : with a regular rhythm [Soft] : abdomen soft [Not Tender] : non-tender [No Edema] : there was no peripheral edema [Anterior Cervical Nodes] : anterior cervical nodes [Post Cervical Nodes] : posterior cervical nodes [Normal Gait] : normal gait [Normal] : normal and non tender [No Rash] : no rash [Oriented x3] : oriented to person, place, and time [Acanthosis Nigricans] : no acanthosis nigricans [No Tremors] : no tremors

## 2019-12-23 NOTE — HISTORY OF PRESENT ILLNESS
[FreeTextEntry1] : Hospital follow up s/p CABG x 3 at [facility]. Discharged 12/9/19. Was on all oral DM medications until hospitalization, now insulin.\par \par Quality: DM2\par Severity: uncontrolled\par Onset: routine labs\par Duration: about 15 years\par Modifying Factors: newly on insulin\par Associated Symptoms: + neuropathy\par \par SMBG\par currently checking 3x a day\par on average fasting 120-170\par on average before lunch 140-170\par on average before dinner 150-210\par \par \par Current drug regimen\par Basaglar QHS 20 units\par \par Eye exam: recently had cataract surgery- denies DR\par Foot exam: sees podiatry every 3 months- endorses neuropathy b/l , worse in the right foot \par Kidney disease: denies\par Heart disease: sees cardiology frequently now s/p CABG\par \par Weight: 10 lb weight loss since hospitalization\par Diet: eats 3 meals a day- prepared by wife/daughter\par B: egg whites, veggie omelet- once slice whole wheat bread, tea with milk\par L: whole wheat bread with crow, chicken with gravy\par D: rice, baked fish, chicken breast- uses air fryer\par S: does not snack, drinks diet ginger ale, low fat yogurt \par Exercise: walks around house- not cleared for exercise\par Smoking: denies\par \par \par HTN\par metoprolol 25 mg\par controlled by cardiology\par \par \par HLD\par simvastatin 20 mg\par controlled by cardiology

## 2019-12-23 NOTE — ASSESSMENT
[FreeTextEntry1] : T2DM\par -much improved control compared to prior to hospitalization\par -increase Basaglar to 22 units\par -insurance will only cover 3 FS a day, add FS at bedtime as dinner is biggest meal and eliminate pre lunch FS\par -continue to make diet changes, has already cut a lot of the bread and rice which is cultural for pt\par -when cleared, add 30 mins of exercise daily\par -cde apt in 2-3 weeks, bring logs\par \par hld\par continue statin as per cardiology\par \par htn\par continue beta blocker as per cardiology\par labs and rto in 6-8 weeks\par \par

## 2019-12-31 ENCOUNTER — NON-APPOINTMENT (OUTPATIENT)
Age: 69
End: 2019-12-31

## 2019-12-31 ENCOUNTER — APPOINTMENT (OUTPATIENT)
Dept: CARDIOLOGY | Facility: CLINIC | Age: 69
End: 2019-12-31
Payer: MEDICARE

## 2019-12-31 VITALS
BODY MASS INDEX: 30.21 KG/M2 | SYSTOLIC BLOOD PRESSURE: 99 MMHG | OXYGEN SATURATION: 98 % | HEART RATE: 90 BPM | DIASTOLIC BLOOD PRESSURE: 65 MMHG | WEIGHT: 204 LBS | HEIGHT: 69 IN

## 2019-12-31 DIAGNOSIS — J90 PLEURAL EFFUSION, NOT ELSEWHERE CLASSIFIED: ICD-10-CM

## 2019-12-31 DIAGNOSIS — R06.09 OTHER FORMS OF DYSPNEA: ICD-10-CM

## 2019-12-31 DIAGNOSIS — Z86.79 PERSONAL HISTORY OF OTHER DISEASES OF THE CIRCULATORY SYSTEM: ICD-10-CM

## 2019-12-31 PROCEDURE — 99214 OFFICE O/P EST MOD 30 MIN: CPT

## 2019-12-31 PROCEDURE — 93000 ELECTROCARDIOGRAM COMPLETE: CPT

## 2019-12-31 RX ORDER — CLOPIDOGREL 75 MG/1
75 TABLET, FILM COATED ORAL
Refills: 0 | Status: DISCONTINUED | COMMUNITY
End: 2019-12-31

## 2019-12-31 RX ORDER — FUROSEMIDE 40 MG/1
40 TABLET ORAL
Refills: 0 | Status: DISCONTINUED | COMMUNITY
End: 2019-12-31

## 2019-12-31 RX ORDER — POTASSIUM CHLORIDE 1500 MG/1
20 TABLET, FILM COATED, EXTENDED RELEASE ORAL
Refills: 0 | Status: DISCONTINUED | COMMUNITY
End: 2019-12-31

## 2019-12-31 NOTE — REASON FOR VISIT
[Follow-Up - Clinic] : a clinic follow-up of [Coronary Artery Disease] : coronary artery disease [Dyspnea] : dyspnea [Medication Management] : Medication management

## 2019-12-31 NOTE — PHYSICAL EXAM
[General Appearance - Well Developed] : well developed [Well Groomed] : well groomed [General Appearance - Well Nourished] : well nourished [Normal Conjunctiva] : the conjunctiva exhibited no abnormalities [General Appearance - In No Acute Distress] : no acute distress [No Oral Pallor] : no oral pallor [Normal Jugular Venous A Waves Present] : normal jugular venous A waves present [Normal Oral Mucosa] : normal oral mucosa [Normal Jugular Venous V Waves Present] : normal jugular venous V waves present [Bowel Sounds] : normal bowel sounds [Abnormal Walk] : normal gait [Abdomen Soft] : soft [Nail Clubbing] : no clubbing of the fingernails [] : no rash [Cyanosis, Localized] : no localized cyanosis [Affect] : the affect was normal [Oriented To Time, Place, And Person] : oriented to person, place, and time [No Anxiety] : not feeling anxious [5th Left ICS - MCL] : palpated at the 5th LICS in the midclavicular line [Normal] : normal [Rhythm Regular] : regular [Normal S2] : normal S2 [Normal S1] : normal S1 [I] : a grade 1 [2+] : left 2+ [No Pitting Edema] : no pitting edema present [FreeTextEntry1] : Decreased BS over left lower lung field. CTAP elsewhere.

## 2019-12-31 NOTE — CARDIOLOGY SUMMARY
[Normal] : normal [No Ischemia] : no Ischemia [LVEF ___%] : LVEF [unfilled]% [None] : normal LV function [___] : [unfilled]

## 2019-12-31 NOTE — DISCUSSION/SUMMARY
[___ Month(s)] : [unfilled] month(s) [With ___] : with [unfilled] [FreeTextEntry1] : CAD/CABG: Pleural effusion resolved and will stop Lasix at this point. Discussed starting cardiac rehab and will have stress test to enroll .\par Hyperlipidemia: Will have blood work with endo and after completed forward a coy\par Diabetes mellitus: Reports good results when checked recently. \par F/u in 2 months.

## 2019-12-31 NOTE — HISTORY OF PRESENT ILLNESS
[FreeTextEntry1] : Cough resolved with lasix and left lung sounds clear on exam today. Feeling better. Cleared to drive and start cardiac rehab.

## 2020-01-02 NOTE — PROCEDURE NOTE - NSICDXPROCEDURE_GEN_ALL_CORE_FT
----- Message from Hetal Banks MD sent at 1/2/2020  2:21 PM CST -----  CT scan looks fine, no issues seen with pancreas, no pancreatitis.    Labs show minor decrease in kidney function and very slight increase in blood counts.  Could be related to dehydration.  Drink plenty of water and we will repeat labs at visit scheduled later this month.   PROCEDURES:  Insertion of left chest tube 17-Dec-2019 16:51:51  Jayda Jimenez

## 2020-01-08 ENCOUNTER — APPOINTMENT (OUTPATIENT)
Dept: CARDIOLOGY | Facility: CLINIC | Age: 70
End: 2020-01-08
Payer: MEDICARE

## 2020-01-08 PROCEDURE — 93015 CV STRESS TEST SUPVJ I&R: CPT

## 2020-01-09 ENCOUNTER — RX RENEWAL (OUTPATIENT)
Age: 70
End: 2020-01-09

## 2020-01-10 ENCOUNTER — MEDICATION RENEWAL (OUTPATIENT)
Age: 70
End: 2020-01-10

## 2020-01-14 ENCOUNTER — APPOINTMENT (OUTPATIENT)
Dept: ENDOCRINOLOGY | Facility: CLINIC | Age: 70
End: 2020-01-14

## 2020-01-21 ENCOUNTER — APPOINTMENT (OUTPATIENT)
Dept: ENDOCRINOLOGY | Facility: CLINIC | Age: 70
End: 2020-01-21
Payer: MEDICARE

## 2020-01-21 PROCEDURE — 95249 CONT GLUC MNTR PT PROV EQP: CPT

## 2020-01-21 PROCEDURE — 97802 MEDICAL NUTRITION INDIV IN: CPT

## 2020-01-24 ENCOUNTER — OUTPATIENT (OUTPATIENT)
Dept: OUTPATIENT SERVICES | Facility: HOSPITAL | Age: 70
LOS: 1 days | End: 2020-01-24
Payer: MEDICARE

## 2020-01-24 ENCOUNTER — APPOINTMENT (OUTPATIENT)
Dept: CARDIOTHORACIC SURGERY | Facility: CLINIC | Age: 70
End: 2020-01-24
Payer: MEDICARE

## 2020-01-24 VITALS
SYSTOLIC BLOOD PRESSURE: 133 MMHG | RESPIRATION RATE: 16 BRPM | HEART RATE: 75 BPM | OXYGEN SATURATION: 98 % | DIASTOLIC BLOOD PRESSURE: 78 MMHG

## 2020-01-24 DIAGNOSIS — Z98.49 CATARACT EXTRACTION STATUS, UNSPECIFIED EYE: Chronic | ICD-10-CM

## 2020-01-24 DIAGNOSIS — Z98.890 OTHER SPECIFIED POSTPROCEDURAL STATES: Chronic | ICD-10-CM

## 2020-01-24 DIAGNOSIS — Z95.1 PRESENCE OF AORTOCORONARY BYPASS GRAFT: Chronic | ICD-10-CM

## 2020-01-24 DIAGNOSIS — Z95.1 PRESENCE OF AORTOCORONARY BYPASS GRAFT: ICD-10-CM

## 2020-01-24 PROCEDURE — 71046 X-RAY EXAM CHEST 2 VIEWS: CPT | Mod: 26

## 2020-01-24 PROCEDURE — 99024 POSTOP FOLLOW-UP VISIT: CPT

## 2020-01-24 PROCEDURE — 71046 X-RAY EXAM CHEST 2 VIEWS: CPT

## 2020-02-10 ENCOUNTER — RX RENEWAL (OUTPATIENT)
Age: 70
End: 2020-02-10

## 2020-02-10 ENCOUNTER — APPOINTMENT (OUTPATIENT)
Dept: ENDOCRINOLOGY | Facility: CLINIC | Age: 70
End: 2020-02-10
Payer: MEDICARE

## 2020-02-10 VITALS
WEIGHT: 206 LBS | DIASTOLIC BLOOD PRESSURE: 70 MMHG | BODY MASS INDEX: 30.51 KG/M2 | SYSTOLIC BLOOD PRESSURE: 110 MMHG | HEIGHT: 69 IN

## 2020-02-10 PROCEDURE — 99214 OFFICE O/P EST MOD 30 MIN: CPT

## 2020-02-10 NOTE — REVIEW OF SYSTEMS
[Constipation] : constipation [Polyuria] : polyuria [Nocturia] : nocturia [Joint Pain] : joint pain [Joint Stiffness] : joint stiffness [Fatigue] : no fatigue [Recent Weight Gain (___ Lbs)] : no recent weight gain [Recent Weight Loss (___ Lbs)] : no recent weight loss [Visual Field Defect] : no visual field defect [Dysphagia] : no dysphagia [Dysphonia] : no dysphonia [Neck Pain] : no neck pain [Chest Pain] : no chest pain [Palpitations] : no palpitations [Shortness Of Breath] : no shortness of breath [Nausea] : no nausea [Vomiting] : no vomiting was observed [Diarrhea] : no diarrhea [Acanthosis] : no acanthosis  [Dry Skin] : no dry skin [Headache] : no headaches [Tremors] : no tremors [Depression] : no depression [Anxiety] : no anxiety [Polydipsia] : no polydipsia [Cold Intolerance] : cold tolerant [Heat Intolerance] : heat tolerant [FreeTextEntry9] : left knee

## 2020-02-10 NOTE — PHYSICAL EXAM
[Alert] : alert [No Acute Distress] : no acute distress [Well Developed] : well developed [Well Nourished] : well nourished [Normal Sclera/Conjunctiva] : normal sclera/conjunctiva [Normal Hearing] : hearing was normal [Supple] : the neck was supple [Normal Rate and Effort] : normal respiratory rhythm and effort [No Accessory Muscle Use] : no accessory muscle use [Clear to Auscultation] : lungs were clear to auscultation bilaterally [Normal Rate] : heart rate was normal  [Normal S1, S2] : normal S1 and S2 [Regular Rhythm] : with a regular rhythm [No Edema] : there was no peripheral edema [Not Tender] : non-tender [Soft] : abdomen soft [Post Cervical Nodes] : posterior cervical nodes [Anterior Cervical Nodes] : anterior cervical nodes [Normal] : normal and non tender [Normal Gait] : normal gait [Acanthosis Nigricans] : no acanthosis nigricans [No Rash] : no rash [No Tremors] : no tremors [Oriented x3] : oriented to person, place, and time

## 2020-02-10 NOTE — ASSESSMENT
[FreeTextEntry1] : Pt going to [place] for 3 months. Will check on sandro view in end of March\par \par T2DM\par -continue basaglar to 25 units QHS \par -begin to introduce MFN. Goal to reach 1000 mg BID but slowly reintroduce 500 mg weekly.\par -continue to use sandro, very helpful in disease management \par -continue to make diet changes, has already cut a lot of the bread and rice which is cultural for pt\par -when cleared, add 30 mins of exercise daily, goal of 5x a week\par \par hld\par continue statin as per cardiology\par \par htn\par continue beta blocker as per cardiology\par \par have labs done ASAP before he leaves for trip\par  rto in 3 months after trip to [facility]\par

## 2020-02-10 NOTE — HISTORY OF PRESENT ILLNESS
[FreeTextEntry1] : Last seen as a hospital follow up s/p CABG x 3 at [facility]. Discharged 12/9/19. Was on all oral DM medications until hospitalization, now insulin.\par \par Quality: DM2\par Severity: uncontrolled\par Onset: routine labs\par Duration: about 15 years\par Modifying Factors: newly on insulin\par Associated Symptoms: + neuropathy\par \par SMBG\par using Lou\par On recall Fasting 150-160\par Unable to perform Lou download due to technical difficulties \par \par \par Current drug regimen\par Basaglar QHS 25 units\par \par Eye exam: recently had cataract surgery- denies DR, has not been back to optometry since \par Foot exam: sees podiatry every 3 months- endorses neuropathy b/l , worse in the right foot \par Kidney disease: denies\par Heart disease: sees cardiology frequently now s/p CABG, next apt 2/12/2020\par \par Weight: 10 lb weight loss since hospitalization, now stable \par Diet: eats 3 meals a day- prepared by wife/daughter\par B: egg whites, veggie omelet- once slice whole wheat bread, tea with milk\par L: whole wheat bread with crow, chicken with gravy\par D: rice, baked fish, chicken breast- uses air fryer\par S: does not snack, drinks diet ginger ale, low fat yogurt \par Exercise: walks around house- not cleared for exercise- doing cardiac rehab \par Smoking: denies\par \par \par HTN\par metoprolol 25 mg (12.5 mg BID) \par controlled by cardiology\par \par \par HLD\par simvastatin 20 mg\par controlled by cardiology

## 2020-02-12 ENCOUNTER — APPOINTMENT (OUTPATIENT)
Dept: CARDIOLOGY | Facility: CLINIC | Age: 70
End: 2020-02-12
Payer: MEDICARE

## 2020-02-12 VITALS
OXYGEN SATURATION: 99 % | BODY MASS INDEX: 30.51 KG/M2 | SYSTOLIC BLOOD PRESSURE: 116 MMHG | DIASTOLIC BLOOD PRESSURE: 69 MMHG | HEART RATE: 65 BPM | HEIGHT: 69 IN | WEIGHT: 206 LBS

## 2020-02-12 PROCEDURE — 99214 OFFICE O/P EST MOD 30 MIN: CPT | Mod: 25

## 2020-02-12 PROCEDURE — 93000 ELECTROCARDIOGRAM COMPLETE: CPT

## 2020-02-12 NOTE — PHYSICAL EXAM
[General Appearance - Well Developed] : well developed [Well Groomed] : well groomed [General Appearance - Well Nourished] : well nourished [General Appearance - In No Acute Distress] : no acute distress [Normal Conjunctiva] : the conjunctiva exhibited no abnormalities [Normal Oral Mucosa] : normal oral mucosa [No Oral Pallor] : no oral pallor [Normal Jugular Venous A Waves Present] : normal jugular venous A waves present [Normal Jugular Venous V Waves Present] : normal jugular venous V waves present [Bowel Sounds] : normal bowel sounds [Abdomen Soft] : soft [Abnormal Walk] : normal gait [Nail Clubbing] : no clubbing of the fingernails [Cyanosis, Localized] : no localized cyanosis [] : no rash [Oriented To Time, Place, And Person] : oriented to person, place, and time [Affect] : the affect was normal [No Anxiety] : not feeling anxious [5th Left ICS - MCL] : palpated at the 5th LICS in the midclavicular line [Normal] : normal [Rhythm Regular] : regular [Normal S1] : normal S1 [Normal S2] : normal S2 [I] : a grade 1 [2+] : left 2+ [No Pitting Edema] : no pitting edema present [FreeTextEntry1] : no LE edema

## 2020-02-12 NOTE — HISTORY OF PRESENT ILLNESS
[FreeTextEntry1] : Feels fine, He feels much better and is undergoing cardiac rehab. had recent CXR which showed no pleural effusion.  Denies chest pain, shortness of breath, dyspnea on exertion, palpitations, orthopnea, paroxysmal nocturnal dyspnea, claudication, dizziness, lightheadedness, presyncopal or syncopal symptoms. \par \par Planning to travel to [place] in a week for 3 months.

## 2020-02-12 NOTE — END OF VISIT
[FreeTextEntry3] : I reviewed the note and edited it in the key areas.  I was present for key portions of this interaction with the NP and discussed the case with them.

## 2020-02-12 NOTE — DISCUSSION/SUMMARY
[FreeTextEntry1] : CAD/CABG:Continue cardiac rehab , Recent CXR shows no pleural effusions. Follow up with Cardiothoracic. . \par Hyperlipidemia: Will have blood work next visit. \par Diabetes mellitus: Follows up with endocrinology. \par F/u once patient comes back from [place].

## 2020-02-12 NOTE — REVIEW OF SYSTEMS
[Fever] : no fever [Dyspnea on exertion] : not dyspnea during exertion [Feeling Fatigued] : not feeling fatigued [Chills] : no chills [Palpitations] : no palpitations [Chest Pain] : no chest pain [Chest  Pressure] : no chest pressure [Cough] : no cough

## 2020-02-24 ENCOUNTER — APPOINTMENT (OUTPATIENT)
Dept: ENDOCRINOLOGY | Facility: CLINIC | Age: 70
End: 2020-02-24

## 2020-08-26 ENCOUNTER — APPOINTMENT (OUTPATIENT)
Dept: CARDIOLOGY | Facility: CLINIC | Age: 70
End: 2020-08-26

## 2020-09-08 ENCOUNTER — RX RENEWAL (OUTPATIENT)
Age: 70
End: 2020-09-08

## 2020-12-09 ENCOUNTER — RX RENEWAL (OUTPATIENT)
Age: 70
End: 2020-12-09

## 2021-01-01 NOTE — DISCHARGE NOTE NURSING/CASE MANAGEMENT/SOCIAL WORK - NSDCDMETYPESERV_GEN_ALL_CORE_FT
Discharge instructions given to pt's mother. No questions, mother verbalized understanding all instructions. Mother aware to make follow up appointment as listed on AVS. Pt d/c'd off unit at this time, carried by mother, to home. Grandmother arrived to transport mother and baby home. Belongings in hand. No issues noted. ROLLING WALKER AND 3 IN 1 COMMODE

## 2021-04-29 NOTE — DISCHARGE NOTE PROVIDER - NSDCADMDATE_GEN_ALL_CORE_FT
27-Nov-2019 00:22 Ears: no ear pain and no hearing problems. Nose: no nasal congestion and no nasal drainage. Mouth/Throat: no dysphagia, no hoarseness and no throat pain. Neck: no lumps, no pain, no stiffness and no swollen glands.

## 2021-08-09 ENCOUNTER — APPOINTMENT (OUTPATIENT)
Dept: ENDOCRINOLOGY | Facility: CLINIC | Age: 71
End: 2021-08-09
Payer: MEDICARE

## 2021-08-09 VITALS — DIASTOLIC BLOOD PRESSURE: 72 MMHG | SYSTOLIC BLOOD PRESSURE: 120 MMHG

## 2021-08-09 VITALS — BODY MASS INDEX: 31.7 KG/M2 | WEIGHT: 214 LBS | OXYGEN SATURATION: 98 % | HEART RATE: 71 BPM | HEIGHT: 69 IN

## 2021-08-09 PROCEDURE — 99214 OFFICE O/P EST MOD 30 MIN: CPT

## 2021-08-09 NOTE — ASSESSMENT
[Carbohydrate Consistent Diet] : carbohydrate consistent diet [Exercise/Effect on Glucose] : exercise/effect on glucose [Self Monitoring of Blood Glucose] : self monitoring of blood glucose [FreeTextEntry1] : T2DM in patient with HTN, HLD and obesity. has not followed up with us in more than a year and he stopped insulin due to running out. \par \par DM2 : all bgs above 200-280. \par restart basaglar  to 20 units \par continue MF  1000 mg BID \par eye exam referral \par covid vaccine done \par has neuropathy : start B12 daily \par discussed diet and exercise\par Needs to try to have more protein for meals\par cont ASA daily \par continue to make diet changes, cut down rice and bread \par \par hld: check lipids continue current management. statin \par low fat/low cholesterol diet and weight loss advised\par \par htn:  bp at target on meds. Continue current management.\par I advised low fat/low cholesterol diet, low salt diet, and weight loss\par continue beta blocker as per cardiology\par  \par

## 2021-08-13 ENCOUNTER — NON-APPOINTMENT (OUTPATIENT)
Age: 71
End: 2021-08-13

## 2021-08-13 ENCOUNTER — APPOINTMENT (OUTPATIENT)
Dept: CARDIOLOGY | Facility: CLINIC | Age: 71
End: 2021-08-13
Payer: MEDICARE

## 2021-08-13 VITALS
SYSTOLIC BLOOD PRESSURE: 105 MMHG | OXYGEN SATURATION: 99 % | HEIGHT: 69 IN | BODY MASS INDEX: 31.1 KG/M2 | WEIGHT: 210 LBS | DIASTOLIC BLOOD PRESSURE: 68 MMHG | HEART RATE: 61 BPM

## 2021-08-13 VITALS
DIASTOLIC BLOOD PRESSURE: 68 MMHG | HEART RATE: 61 BPM | RESPIRATION RATE: 16 BRPM | SYSTOLIC BLOOD PRESSURE: 105 MMHG | OXYGEN SATURATION: 99 % | TEMPERATURE: 98 F

## 2021-08-13 DIAGNOSIS — E66.9 OBESITY, UNSPECIFIED: ICD-10-CM

## 2021-08-13 PROCEDURE — 99214 OFFICE O/P EST MOD 30 MIN: CPT

## 2021-08-13 PROCEDURE — 93000 ELECTROCARDIOGRAM COMPLETE: CPT

## 2021-08-13 RX ORDER — LORATADINE 10 MG
17 TABLET,DISINTEGRATING ORAL DAILY
Refills: 0 | Status: DISCONTINUED | COMMUNITY
End: 2021-08-13

## 2021-08-13 NOTE — CARDIOLOGY SUMMARY
[de-identified] : 8/13/21  sinus rhythm  [de-identified] : 8/20/19 Normal nuclear stress test  [de-identified] : 2014,2019 PAtent stents LAD ,LCX RCA, 80%D1,2019 3 V disease with normal function \par \par  [de-identified] : 2019 CABGx4 with SVG to PDA, OM1, Diag,  and LIMA to LAD

## 2021-08-13 NOTE — HISTORY OF PRESENT ILLNESS
[FreeTextEntry1] : 71 year old male with hx CAD CABG DM HTN came for follow up , was in [place] since last visit , due to covid season he could not come  , other wise he is doing well  did have covid test once he landed in [place] \par \par  Denies chest pain, shortness of breath, dyspnea on exertion, palpitations, orthopnea, paroxysmal nocturnal dyspnea, claudication, dizziness, lightheadedness, presyncopal or syncopal symptoms. \par \par Patient  blood pressure is controlled , blood sugar is controlled , as well as lipids   \par \par  did have blood work two days ago ,pending result

## 2021-08-13 NOTE — PHYSICAL EXAM
[Normal Conjunctiva] : normal conjunctiva [Normal Venous Pressure] : normal venous pressure [No Carotid Bruit] : no carotid bruit [No Gallop] : no gallop [5th Left ICS - MCL] : palpated at the 5th LICS in the midclavicular line [Normal] : normal [No Precordial Heave] : no precordial heave was noted [Normal Rate] : normal [Rhythm Regular] : regular [Normal S1] : normal S1 [Normal S2] : normal S2 [II] : a grade 2 [I] : a grade 1 [2+] : left 2+ [Clear Lung Fields] : clear lung fields [Good Air Entry] : good air entry [No Respiratory Distress] : no respiratory distress  [Soft] : abdomen soft [Non Tender] : non-tender [No Masses/organomegaly] : no masses/organomegaly [Normal Bowel Sounds] : normal bowel sounds [Normal Gait] : normal gait [No Edema] : no edema [No Cyanosis] : no cyanosis [No Clubbing] : no clubbing [No Varicosities] : no varicosities [No Rash] : no rash [No Skin Lesions] : no skin lesions [Moves all extremities] : moves all extremities [No Focal Deficits] : no focal deficits [Normal Speech] : normal speech [Alert and Oriented] : alert and oriented [Normal memory] : normal memory [Obese] : obese [Rt] : no varicose veins of the right leg [Lt] : no varicose veins of the left leg [Right Carotid Bruit] : no bruit heard over the right carotid [Left Carotid Bruit] : no bruit heard over the left carotid [Bruit] : no bruit heard

## 2021-08-13 NOTE — DISCUSSION/SUMMARY
[FreeTextEntry1] : Patient with above hx   came for follow up  without  any active symptoms \par \par CAD/CABG:  patient asymptomatic cardiac wise , with good exercise capacity , continue statin , asa plvix ,, \par \par HTN   controlled  , continue low salt diet and medication , encourage to stay hydrated \par \par Hyperlipidemia:  controlled  Will have blood work next visit. \par \par Diabetes mellitus:  controlled , will obtain his recent blood work \par \par Obesity : advised the patient to exercise , diet restriction  loose weight \par \par follow up after 4 months \par

## 2021-08-24 LAB
ALBUMIN SERPL ELPH-MCNC: 4.4 G/DL
ALP BLD-CCNC: 141 U/L
ALT SERPL-CCNC: 30 U/L
ANION GAP SERPL CALC-SCNC: 12 MMOL/L
AST SERPL-CCNC: 30 U/L
BASOPHILS # BLD AUTO: 0.05 K/UL
BASOPHILS NFR BLD AUTO: 0.6 %
BILIRUB SERPL-MCNC: 0.4 MG/DL
BUN SERPL-MCNC: 17 MG/DL
CALCIUM SERPL-MCNC: 9.3 MG/DL
CHLORIDE SERPL-SCNC: 100 MMOL/L
CHOLEST SERPL-MCNC: 193 MG/DL
CO2 SERPL-SCNC: 24 MMOL/L
CREAT SERPL-MCNC: 1.1 MG/DL
CREAT SPEC-SCNC: 181 MG/DL
EOSINOPHIL # BLD AUTO: 0.45 K/UL
EOSINOPHIL NFR BLD AUTO: 5.3 %
ESTIMATED AVERAGE GLUCOSE: 252 MG/DL
GLUCOSE SERPL-MCNC: 233 MG/DL
HBA1C MFR BLD HPLC: 10.4 %
HCT VFR BLD CALC: 41.2 %
HDLC SERPL-MCNC: 39 MG/DL
HGB BLD-MCNC: 11.9 G/DL
IMM GRANULOCYTES NFR BLD AUTO: 0.5 %
LDLC SERPL CALC-MCNC: 111 MG/DL
LDLC SERPL DIRECT ASSAY-MCNC: 130 MG/DL
LYMPHOCYTES # BLD AUTO: 2.54 K/UL
LYMPHOCYTES NFR BLD AUTO: 30 %
MAN DIFF?: NORMAL
MCHC RBC-ENTMCNC: 21.8 PG
MCHC RBC-ENTMCNC: 28.9 GM/DL
MCV RBC AUTO: 75.6 FL
MICROALBUMIN 24H UR DL<=1MG/L-MCNC: <1.2 MG/DL
MICROALBUMIN/CREAT 24H UR-RTO: NORMAL MG/G
MONOCYTES # BLD AUTO: 0.62 K/UL
MONOCYTES NFR BLD AUTO: 7.3 %
NEUTROPHILS # BLD AUTO: 4.77 K/UL
NEUTROPHILS NFR BLD AUTO: 56.3 %
NONHDLC SERPL-MCNC: 154 MG/DL
PLATELET # BLD AUTO: 224 K/UL
POTASSIUM SERPL-SCNC: 5.2 MMOL/L
PROT SERPL-MCNC: 7.4 G/DL
RBC # BLD: 5.45 M/UL
RBC # FLD: 15.9 %
SODIUM SERPL-SCNC: 136 MMOL/L
TRIGL SERPL-MCNC: 217 MG/DL
WBC # FLD AUTO: 8.47 K/UL

## 2021-08-26 ENCOUNTER — APPOINTMENT (OUTPATIENT)
Dept: CARDIOLOGY | Facility: CLINIC | Age: 71
End: 2021-08-26
Payer: MEDICARE

## 2021-08-26 PROCEDURE — 93306 TTE W/DOPPLER COMPLETE: CPT

## 2021-09-09 ENCOUNTER — APPOINTMENT (OUTPATIENT)
Dept: OTOLARYNGOLOGY | Facility: CLINIC | Age: 71
End: 2021-09-09
Payer: MEDICARE

## 2021-09-09 VITALS
DIASTOLIC BLOOD PRESSURE: 70 MMHG | HEIGHT: 69 IN | SYSTOLIC BLOOD PRESSURE: 105 MMHG | BODY MASS INDEX: 31.1 KG/M2 | HEART RATE: 75 BPM | WEIGHT: 210 LBS | TEMPERATURE: 95.5 F

## 2021-09-09 PROCEDURE — 99204 OFFICE O/P NEW MOD 45 MIN: CPT | Mod: 25

## 2021-09-09 PROCEDURE — 92557 COMPREHENSIVE HEARING TEST: CPT

## 2021-09-09 PROCEDURE — G0268 REMOVAL OF IMPACTED WAX MD: CPT

## 2021-09-09 PROCEDURE — 92567 TYMPANOMETRY: CPT

## 2021-09-09 NOTE — DATA REVIEWED
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

## 2021-09-09 NOTE — ASSESSMENT
[FreeTextEntry1] : Patient with bilat snhl with bilateral tinnitus.  Discussed tinnitus due to snhl.  Discussed HAE but patient not interested at this time .  Cerumen also removed bilaterally and patient does have sl stenotic right eac. Follow up in 6 mos and as necessary

## 2021-09-09 NOTE — PHYSICAL EXAM
[Midline] : trachea located in midline position [Normal] : no rashes [de-identified] : sl stenotic right eac; left normal - bilat xs cerumen removed  [de-identified] : after cerumen removal

## 2021-09-09 NOTE — HISTORY OF PRESENT ILLNESS
[de-identified] : c/o noise in ears.  Problem for several years.  Bilateral - varies in loudness.  Buzzing sound.   ? hearing ok.  No prior ear problems.

## 2021-09-17 RX ORDER — INSULIN LISPRO 100 [IU]/ML
100 INJECTION, SOLUTION INTRAVENOUS; SUBCUTANEOUS
Qty: 2 | Refills: 1 | Status: DISCONTINUED | COMMUNITY
Start: 2021-09-17 | End: 2021-09-17

## 2021-10-08 NOTE — DISCHARGE NOTE NURSING/CASE MANAGEMENT/SOCIAL WORK - PATIENT PORTAL LINK FT
Subjective    Patient seen and examined on rounds.  Chart reviewed.  Events overnight noted.  History reviewed briefly with patient.    CC:  Deficits in mobility, transfers, self-care status post recent bilateral total knee replacements, recent pneumonia, ventilator-dependent respiratory failure, seizure disorder, deconditioning, multiple medical problems    HPI:  Ms. Fide Amin is a 62-year-old right handed white female with chronic conditions significant for polyarticular degenerative arthritis, hypertension, hyperlipidemia, seizure disorder, anxiety, depression, asthma, restless leg syndrome had elective bilateral total knee replacements secondary to degenerative arthritis of both knees by Dr. Bennett Em at [facility] on 9/20/21. Postoperatively, on the way from the PACU to the floor or soon after arriving on the floor, the patient did sustain a seizure and she does have a history of seizure disorder but had not had one in about a decade.  She was seen by neurologist at [facility] after her seizure.  Postoperative course was significant for hypotension which was felt to be secondary to narcotics, dehydration as well as blood loss.  She had hypoxia with exertion. CT of chest on 9/23/21 revealed no CT evidence of pulmonary embolism, but scattered bilateral airspace opacities were noted and believed to be most likely representing multifocal pneumonia.  Covid 19 pneumonia was excluded. She was allowed weightbearing as tolerated on both lower extremities and on Aspirin and SCDs for DVT prophylaxis. She was apparently offered SNF placement per her records but she declined it.  She was medically cleared for discharge on Ceftin 500mg BID and Zithromax 500mg daily x 5 days and with instructions for follow-up care and discharged to home on 9/23/21 with home care.  Subsequently, she presented to the ER at [facility] on 9/24/21 with increased shortness of breath and was diagnosed with  "aspiration pneumonia.  She was intubated on 9/25/21 and eventually extubated on 10/1/21. She reports she was on oxygen in the acute care hospital and will try to wean her off oxygen as possible.  She has had multiple blisters on both knees.  On 10/5/21, hemoglobin was 8.5, WBC count 6.9, platelets were 545, BUN was 13 and creatinine was 0.6.  She has been needing assistance for mobility, transfers, self-care. She is transferred to [facility] on 10/6/21 for further rehabilitation care.     SUBJ: Feels better today. Denies increased pain. Inspected knee incisions are stable. Encourage incentive spirometry and deep breathing exercises.    ROS: Denies chest pain or shortness of breath. Other ROS negative. Past, family, social history is unchanged.    Physical Exam      Blood pressure 128/62, pulse 85, temperature 37 °C (98.6 °F), temperature source Oral, resp. rate 20, height 1.575 m (5' 2.01\"), weight 63.9 kg (140 lb 14 oz), SpO2 99 %, not currently breastfeeding.  Body mass index is 25.76 kg/m².    General Appearance: Not in acute distress  Head/Ear/Nose/Throat: Normocephalic; Atraumatic.   Eye: EOMI; PERRL.   Neck: No JVD; No Bruits.   Respiratory: Decreased breath sounds at bases.   Cardiovascular: RRR; Normal S1, S2.   Gastrointestinal: Soft; NT; +BS.   Extremities: Bilateral lower extremity edema noted.  Healing incisions noted on anterior aspect of both knees.  She has multiple large blisters present on both knees, with the ones on the right knee drying out.  Dressing is present on the blisters on left knee. She is able to do active straight leg raise more easily on the right and with some difficulty on the left.  Musculoskeletal: Functional active range of motion in both upper extremities.  Some limitation of active range of motion in both hips and knees, limited by pain.    Neurological: AAO ×3. Speech is fluent. Cranial nerve examination does not reveal any gross facial asymmetry. Strength " testing about 4+/5 strength in both upper extremities.  Bilateral hip flexion is 3/5.  Right quadriceps is 3+/5, left quadriceps is 3/5.  Bilateral ankle dorsi and plantar flexion are 4/5.  She is grossly able to localize touch and position sense in her toes.  Deep tendon reflexes are hypoactive and symmetric bilaterally.  Plantars are flexor.  Coordination is functional upper extremities.  Both knee jerks were not tested. Neurologically unchanged.  Behavior/Emotional: Appropriate; Cooperative.   Skin: No obvious rashes noted.  Bilateral knee incisions healing.  She has multiple large blisters present on both knees, with the ones on the right knee drying out.  Dressing is present on the blisters on left knee.  She has bruising noted in both lower extremities including her feet after recent bilateral knee replacements.      Current Facility-Administered Medications:   •  acetaminophen (TYLENOL) tablet 650 mg, 650 mg, oral, With meals & nightly, Fito Michael MD, 650 mg at 10/08/21 0804  •  acetaminophen (TYLENOL) tablet 650 mg, 650 mg, oral, q4h PRN, Fito Michael MD  •  acetaminophen (TYLENOL) tablet 650 mg, 650 mg, oral, q4h PRN, Fito Michael MD  •  albuterol HFA (VENTOLIN HFA) 90 mcg/actuation inhaler 2 puff, 2 puff, inhalation, q4h PRN, Fito Michael MD  •  albuterol HFA (VENTOLIN HFA) 90 mcg/actuation inhaler 2 puff, 2 puff, inhalation, QID (6a, 10a, 2p, 6p), Noé Nunez MD, 2 puff at 10/08/21 0612  •  alum-mag hydroxide-simeth (MAALOX) 200-200-20 mg/5 mL suspension 30 mL, 30 mL, oral, q4h PRN, Fito Michael MD  •  aspirin chewable tablet 81 mg, 81 mg, oral, BID, Fito Michael MD, 81 mg at 10/08/21 0805  •  atorvastatin (LIPITOR) tablet 40 mg, 40 mg, oral, Daily (6p), Noé Nunez MD  •  bisacodyL (DULCOLAX) 10 mg suppository 10 mg, 10 mg, rectal, Daily PRN, Fito Michael MD  •  busPIRone (BUSPAR) tablet 10 mg, 10 mg, oral, With meals & nightly, Fito Michael,  MD, 10 mg at 10/08/21 0804  •  enoxaparin (LOVENOX) syringe 40 mg, 40 mg, subcutaneous, Daily (6p), Noé Nunez MD, 40 mg at 10/07/21 1842  •  famotidine (PEPCID) tablet 20 mg, 20 mg, oral, Nightly, Fito Michael MD, 20 mg at 10/07/21 2111  •  fluticasone furoate-vilanteroL (BREO ELLIPTA) 200-25 mcg/dose powder for inhalation 1 puff, 1 puff, inhalation, Daily, Noé Nunez MD, 1 puff at 10/07/21 1630  •  guaiFENesin (MUCINEX) 12 hr ER tablet 600 mg, 600 mg, oral, BID, Noé Nunez MD, 600 mg at 10/08/21 0804  •  HYDROmorphone (DILAUDID) tablet 2 mg, 2 mg, oral, q4h PRN, Fito Michael MD  •  lamoTRIgine (LAMICTAL XR) extended release tablet 400 mg, 400 mg, oral, Daily, Fito Michael MD, 400 mg at 10/08/21 0808  •  lidocaine (ASPERCREME) 4 % topical patch 2 patch, 2 patch, Topical, Daily, Fito Michael MD, 2 patch at 10/08/21 0803  •  meloxicam (MOBIC) tablet 7.5 mg, 7.5 mg, oral, Daily, Fito Michael MD, 7.5 mg at 10/08/21 0804  •  montelukast (SINGULAIR) tablet 10 mg, 10 mg, oral, Nightly, Fito Michael MD, 10 mg at 10/07/21 2111  •  multivit-min-iron-folic-vit K1 (CENTRUM) chewable tablet 1 tablet, 1 tablet, oral, Daily, Fito Michael MD, 1 tablet at 10/08/21 0805  •  nortriptyline (PAMELOR) capsule 75 mg, 75 mg, oral, Nightly, Fito Michael MD, 75 mg at 10/07/21 2112  •  pantoprazole (PROTONIX) tablet,delayed release (DR/EC) 40 mg, 40 mg, oral, Daily before breakfast, Fito Michael MD, 40 mg at 10/08/21 0804  •  polyethylene glycol (MIRALAX) 17 gram packet 17 g, 17 g, oral, Daily PRN, Fito Michael MD  •  rOPINIRole (REQUIP) tablet 0.5 mg, 0.5 mg, oral, Daily with lunch, Fito Michael MD, 0.5 mg at 10/07/21 1238  •  rOPINIRole (REQUIP) tablet 1 mg, 1 mg, oral, Daily with dinner, Fito Michael MD, 1 mg at 10/07/21 1628  •  rOPINIRole (REQUIP) tablet 1 mg, 1 mg, oral, Daily, Fito Michael MD, 1 mg at 10/08/21 0804  •  rOPINIRole  (REQUIP) tablet 2 mg, 2 mg, oral, Nightly, Fito Michael MD, 2 mg at 10/07/21 2111  •  sennosides-docusate sodium (SENOKOT-S) 8.6-50 mg per tablet 2 tablet, 2 tablet, oral, BID, Noé Nunez MD  •  sertraline (ZOLOFT) tablet 300 mg, 300 mg, oral, Daily, Fito Michael MD, 300 mg at 10/08/21 0804       Labs / Radiology    Lab Results   Component Value Date    WBC 8.71 10/08/2021    HGB 7.7 (L) 10/08/2021    HCT 24.7 (L) 10/08/2021    MCV 95.7 10/08/2021     (H) 10/08/2021     Lab Results   Component Value Date    GLUCOSE 86 10/08/2021    CALCIUM 8.0 (L) 10/08/2021     10/08/2021    K 4.5 10/08/2021    CO2 27 10/08/2021     10/08/2021    BUN 7 (L) 10/08/2021    CREATININE 0.5 (L) 10/08/2021     Assessment and Plan    ASSESSMENT PLAN:  1. 62-year-old right handed white female with chronic conditions significant for polyarticular degenerative arthritis, hypertension, hyperlipidemia, seizure disorder, anxiety, depression, asthma, restless leg syndrome had elective bilateral total knee replacements secondary to degenerative arthritis of both knees by Dr. Bennett Em at [facility] on 9/20/21. Postoperatively, on the way from the PACU to the floor or soon after arriving on the floor, the patient did sustain a seizure and she does have a history of seizure disorder but had not had one in about a decade.  She was seen by neurologist at [facility] after her seizure.  Postoperative course was significant for hypotension which was felt to be secondary to narcotics, dehydration as well as blood loss.  She had hypoxia with exertion. CT of chest on 9/23/21 revealed no CT evidence of pulmonary embolism, but scattered bilateral airspace opacities were noted and believed to be most likely representing multifocal pneumonia.  Covid 19 pneumonia was excluded. She was allowed weightbearing as tolerated on both lower extremities and on Aspirin and SCDs for DVT prophylaxis. She was apparently  offered SNF placement per her records but she declined it.  She was medically cleared for discharge on Ceftin 500mg BID and Zithromax 500mg daily x 5 days and with instructions for follow-up care and discharged to home on 9/23/21 with home care.  Subsequently, she presented to the ER at [facility] on 9/24/21 with increased shortness of breath and was diagnosed with aspiration pneumonia.  She was intubated on 9/25/21 and eventually extubated on 10/1/21. She reports she was on oxygen in the acute care hospital and will try to wean her off oxygen as possible.  She has had multiple blisters on both knees.  On 10/5/21, hemoglobin was 8.5, WBC count 6.9, platelets were 545, BUN was 13 and creatinine was 0.6.  She has been needing assistance for mobility, transfers, self-care. She is transferred to [place] [facility] on 10/6/21 for further rehabilitation care.     2. DVT prophylaxis - on Aspirin.  On SCDs.  Check doppler.  Platelets 213 on 9/22/2021. Platelets 643 on 10/8/2021.     3.  Deconditioning - status post recent bilateral total knee replacements, recent pneumonia, ventilator-dependent respiratory failure, seizure disorder, deconditioning, multiple medical problems - continue PT, OT, psychology.  Follow falls precautions, cardiac precautions, monitor pulse oximetry in therapy.     4. GI - On Pepcid for GI prophylaxis.  On Protonix.  Continue Colace, Senokot.  On PRN MiraLAX.  On PRN Dulcolax suppository. On PRN Maalox.       5.  - voiding.  Denies dysuria.  Monitor post residuals.     6.  Seizure disorder -on Lamictal XR.  Follow seizure precautions.     7. Pain - on Mobic.  On Tylenol.  On PRN Dilaudid.  Ice to knees.  On topical Lidoderm patches.     8. Hematology -  Anemia - on MVI.  Hemoglobin 8.5, WBC 10.36 on 9/22/2021. Hemoglobin 7.7, WBC 8.71 on 10/8/2021.     9.  Psychiatry - history of anxiety and depression - on BuSpar.  On Zoloft.  On Pamelor.     10.  Pulmonary - H/O  asthma, recent pneumonia, ventilator-dependent respiratory failure.  On Singulair.  On Breo Ellipta.  On PRN Ventolin HFA.     11.  Restless leg syndrome - on Requip.     12. Skin - bilateral knee incisions stable.  Multiple blisters noted on both incisions.     13.  Hyperlipidemia - on Lipitor.     14. F/E/N - On MVI. On vitamin C.       15.  Rehabilitation medicine - Continue comprehensive rehabilitation care. Continue PT, OT, psychology.  We will follow falls precautions, cardiac precautions, monitor pulse oximetry in therapy and follow aspiration precautions.Slept well last night.  Tolerating therapies per endurance.  Denies increased pain.  Had a bowel movement.  Voiding well.  Inspected bilateral knee incisions which are stable.  She did not have lab work today, labs will be drawn tomorrow per nursing.   Feels better today. Denies increased pain. Inspected knee incisions are stable. Encourage incentive spirometry and deep breathing exercises.     16. Reviewed labs today.  BUN 13, creatinine 0.6 on 10/5/2021. BUN 7, creatinine 0.5 on 10/8/2021.      Fito Michael MD  10/8/2021     You can access the FollowMyHealth Patient Portal offered by [facility] by registering at the following website: [email or web address]. By joining [facility]’s FollowMyHealth portal, you will also be able to view your health information using other applications (apps) compatible with our system.

## 2021-10-12 ENCOUNTER — OUTPATIENT (OUTPATIENT)
Dept: OUTPATIENT SERVICES | Facility: HOSPITAL | Age: 71
LOS: 1 days | End: 2021-10-12
Payer: MEDICARE

## 2021-10-12 ENCOUNTER — APPOINTMENT (OUTPATIENT)
Dept: ULTRASOUND IMAGING | Facility: CLINIC | Age: 71
End: 2021-10-12
Payer: MEDICARE

## 2021-10-12 DIAGNOSIS — Z00.00 ENCOUNTER FOR GENERAL ADULT MEDICAL EXAMINATION WITHOUT ABNORMAL FINDINGS: ICD-10-CM

## 2021-10-12 DIAGNOSIS — Z95.1 PRESENCE OF AORTOCORONARY BYPASS GRAFT: Chronic | ICD-10-CM

## 2021-10-12 DIAGNOSIS — Z98.49 CATARACT EXTRACTION STATUS, UNSPECIFIED EYE: Chronic | ICD-10-CM

## 2021-10-12 DIAGNOSIS — Z98.890 OTHER SPECIFIED POSTPROCEDURAL STATES: Chronic | ICD-10-CM

## 2021-10-12 PROCEDURE — 76700 US EXAM ABDOM COMPLETE: CPT | Mod: 26

## 2021-10-12 PROCEDURE — 76982 USE 1ST TARGET LESION: CPT | Mod: 26,59

## 2021-10-12 PROCEDURE — 76700 US EXAM ABDOM COMPLETE: CPT

## 2021-10-12 PROCEDURE — 76982 USE 1ST TARGET LESION: CPT | Mod: XU

## 2021-10-14 ENCOUNTER — RX RENEWAL (OUTPATIENT)
Age: 71
End: 2021-10-14

## 2021-10-14 RX ORDER — PEN NEEDLE, DIABETIC 29 G X1/2"
31G X 5 MM NEEDLE, DISPOSABLE MISCELLANEOUS
Qty: 100 | Refills: 1 | Status: ACTIVE | COMMUNITY
Start: 2021-08-10 | End: 1900-01-01

## 2021-10-15 ENCOUNTER — RX RENEWAL (OUTPATIENT)
Age: 71
End: 2021-10-15

## 2021-10-18 ENCOUNTER — APPOINTMENT (OUTPATIENT)
Dept: ORTHOPEDIC SURGERY | Facility: CLINIC | Age: 71
End: 2021-10-18
Payer: MEDICARE

## 2021-10-18 VITALS
HEIGHT: 69 IN | BODY MASS INDEX: 31.1 KG/M2 | DIASTOLIC BLOOD PRESSURE: 67 MMHG | SYSTOLIC BLOOD PRESSURE: 117 MMHG | HEART RATE: 58 BPM | WEIGHT: 210 LBS

## 2021-10-18 DIAGNOSIS — M25.562 PAIN IN LEFT KNEE: ICD-10-CM

## 2021-10-18 DIAGNOSIS — M47.812 SPONDYLOSIS W/OUT MYELOPATHY OR RADICULOPATHY, CERVICAL REGION: ICD-10-CM

## 2021-10-18 DIAGNOSIS — M47.816 SPONDYLOSIS W/OUT MYELOPATHY OR RADICULOPATHY, LUMBAR REGION: ICD-10-CM

## 2021-10-18 DIAGNOSIS — G89.29 PAIN IN LEFT KNEE: ICD-10-CM

## 2021-10-18 PROCEDURE — 99214 OFFICE O/P EST MOD 30 MIN: CPT

## 2021-10-18 PROCEDURE — 72100 X-RAY EXAM L-S SPINE 2/3 VWS: CPT | Mod: 26

## 2021-10-18 PROCEDURE — 72040 X-RAY EXAM NECK SPINE 2-3 VW: CPT | Mod: 26

## 2021-10-18 NOTE — HISTORY OF PRESENT ILLNESS
[de-identified] : David is a 72 y/o male here for evaluation of neck and back pain.  He states that few years ago, he fell and since then his right side low back seems to be bothersome all the time.  He did physical therapy years ago and seemed to have some improvement.  Today he notes pain is in the neck and low back, occasionally pain radiates into both hands equal bilaterally, but pain is primarily in both palms. He also notes having a chronic left knee pain that radiates down lateral calf. He's on plavix and aspirin 81mgs, does not typically use any anti-inflammatory medications.  He denied loss of bowel or bladder function or new weakness.  SILVANA questionnaire negative.

## 2021-10-18 NOTE — PHYSICAL EXAM
[de-identified] : CONSTITUTIONAL: The patient is a very pleasant individual who is well-nourished and who appears stated age.\par PSYCHIATRIC: The patient is alert and oriented X 3 and in no apparent distress, and participates with orthopedic evaluation well.\par HEAD: Atraumatic and is nonsyndromic in appearance.\par EENT: No visible thyromegaly, EOMI.\par RESPIRATORY: Respiratory rate is regular, not dyspneic on examination.\par LYMPHATICS: There is no inguinal lymphadenopathy\par INTEGUMENTARY: Skin is clean, dry, and intact about the bilateral lower extremities and lumbar spine.\par VASCULAR: There is brisk capillary refill about the bilateral lower extremities.\par NEUROLOGIC: There are no pathologic reflexes. There is no decrease in sensation of the bilateral lower extremities on Wartenberg pinwheel/manual examination. Deep tendon reflexes are well maintained at 2+/4 of the bilateral lower extremities and are symmetric..\par MUSCULOSKELETAL: There is no visible muscular atrophy. Manual motor strength is well maintained in the bilateral lower extremities. Range of motion of lumbar spine is well maintained. The patient ambulates in a non-myelopathic manner. Negative tension sign and straight leg raise bilaterally. Quad extension, ankle dorsiflexion, EHL, plantar flexion, and ankle eversion are well preserved. Normal secondary orthopaedic exam of bilateral hips, greater trochanteric area, knees and ankles\par \par Manual motor strength is well maintained in the bilateral upper extremities.  Cervical range of motion is well maintained.  The patient ambulates in a non-myelopathic manner. Normal secondary orthopaedic exam of bilateral shoulders, elbows and hands.  Elbow flexion and extension, wrist extension, finger flexion and abduction are well maintained.\par \par   [de-identified] : cervical xray today 10/18/21: age appropriate cervical degenerative disc disease extends from C3-T1, with anterior osteophytes seen at C3-T1\par \par lumbar xray today 10/18/21: age appropriate lumbar degnerative disc disease, transitional S1 noted, anterior osteophyte seen at L1, L2 and L3, lordosis preserved.

## 2021-10-18 NOTE — DISCUSSION/SUMMARY
[de-identified] : David has been referred to physical therapy for lumbar and cervical stretching, strengthening exercises.  Also will provide him medrol dose pack, advise to not take with anti-inflammatory medications. Advised to use heat/ice, topical treatment such as bengay icy hot for treatment as well.  If pain does not improve in the next 4-6 weeks, advise repeat visit for assessment at which point we can consider lumbar MRI.  Also he has been referred to knee specialist for left knee pain eval.

## 2021-10-18 NOTE — REASON FOR VISIT
[Initial Visit] : an initial visit for [Back Pain] : back pain [Neck Pain] : neck pain [FreeTextEntry2] : neck pain/lower back pain

## 2021-10-27 ENCOUNTER — APPOINTMENT (OUTPATIENT)
Dept: ENDOCRINOLOGY | Facility: CLINIC | Age: 71
End: 2021-10-27

## 2021-10-28 ENCOUNTER — APPOINTMENT (OUTPATIENT)
Dept: ENDOCRINOLOGY | Facility: CLINIC | Age: 71
End: 2021-10-28
Payer: MEDICARE

## 2021-10-28 ENCOUNTER — APPOINTMENT (OUTPATIENT)
Dept: ORTHOPEDIC SURGERY | Facility: CLINIC | Age: 71
End: 2021-10-28
Payer: MEDICARE

## 2021-10-28 VITALS
BODY MASS INDEX: 31.1 KG/M2 | HEIGHT: 69 IN | DIASTOLIC BLOOD PRESSURE: 73 MMHG | WEIGHT: 210 LBS | HEART RATE: 96 BPM | SYSTOLIC BLOOD PRESSURE: 129 MMHG

## 2021-10-28 VITALS — BODY MASS INDEX: 31.55 KG/M2 | WEIGHT: 213 LBS | HEIGHT: 69 IN

## 2021-10-28 VITALS — OXYGEN SATURATION: 99 % | DIASTOLIC BLOOD PRESSURE: 64 MMHG | HEART RATE: 73 BPM | SYSTOLIC BLOOD PRESSURE: 118 MMHG

## 2021-10-28 DIAGNOSIS — M17.12 UNILATERAL PRIMARY OSTEOARTHRITIS, LEFT KNEE: ICD-10-CM

## 2021-10-28 DIAGNOSIS — S83.242A OTHER TEAR OF MEDIAL MENISCUS, CURRENT INJURY, LEFT KNEE, INITIAL ENCOUNTER: ICD-10-CM

## 2021-10-28 DIAGNOSIS — Z86.39 PERSONAL HISTORY OF OTHER ENDOCRINE, NUTRITIONAL AND METABOLIC DISEASE: ICD-10-CM

## 2021-10-28 DIAGNOSIS — Z86.79 PERSONAL HISTORY OF OTHER DISEASES OF THE CIRCULATORY SYSTEM: ICD-10-CM

## 2021-10-28 DIAGNOSIS — Z79.4 LONG TERM (CURRENT) USE OF INSULIN: ICD-10-CM

## 2021-10-28 PROCEDURE — 99214 OFFICE O/P EST MOD 30 MIN: CPT

## 2021-10-28 PROCEDURE — 99213 OFFICE O/P EST LOW 20 MIN: CPT

## 2021-10-28 PROCEDURE — 73560 X-RAY EXAM OF KNEE 1 OR 2: CPT | Mod: LT

## 2021-10-28 RX ORDER — ASPIRIN 325 MG/1
TABLET, FILM COATED ORAL
Refills: 0 | Status: DISCONTINUED | COMMUNITY
End: 2021-10-28

## 2021-10-28 RX ORDER — GLIPIZIDE 5 MG/1
5 TABLET ORAL
Qty: 90 | Refills: 0 | Status: DISCONTINUED | COMMUNITY
Start: 2021-10-14 | End: 2021-10-28

## 2021-10-28 RX ORDER — CLOPIDOGREL 75 MG/1
TABLET, FILM COATED ORAL
Refills: 0 | Status: DISCONTINUED | COMMUNITY
End: 2021-10-28

## 2021-10-28 RX ORDER — FLASH GLUCOSE SENSOR
KIT MISCELLANEOUS
Qty: 6 | Refills: 2 | Status: ACTIVE | COMMUNITY
Start: 2020-01-09 | End: 1900-01-01

## 2021-10-28 NOTE — PHYSICAL EXAM
[Alert] : alert [Well Nourished] : well nourished [No Acute Distress] : no acute distress [Well Developed] : well developed [Normal Sclera/Conjunctiva] : normal sclera/conjunctiva [EOMI] : extra ocular movement intact [No Proptosis] : no proptosis [Normal Oropharynx] : the oropharynx was normal [Thyroid Not Enlarged] : the thyroid was not enlarged [No Thyroid Nodules] : no palpable thyroid nodules [No Respiratory Distress] : no respiratory distress [No Accessory Muscle Use] : no accessory muscle use [Clear to Auscultation] : lungs were clear to auscultation bilaterally [Normal S1, S2] : normal S1 and S2 [Normal Rate] : heart rate was normal [Regular Rhythm] : with a regular rhythm [No Edema] : no peripheral edema [Pedal Pulses Normal] : the pedal pulses are present [Normal Bowel Sounds] : normal bowel sounds [Not Tender] : non-tender [Not Distended] : not distended [Soft] : abdomen soft [Normal Anterior Cervical Nodes] : no anterior cervical lymphadenopathy [Normal Strength/Tone] : muscle strength and tone were normal [Normal Reflexes] : deep tendon reflexes were 2+ and symmetric [No Tremors] : no tremors [Oriented x3] : oriented to person, place, and time [Acanthosis Nigricans] : no acanthosis nigricans

## 2021-10-28 NOTE — ASSESSMENT
[Carbohydrate Consistent Diet] : carbohydrate consistent diet [Self Monitoring of Blood Glucose] : self monitoring of blood glucose [Weight Loss] : weight loss [FreeTextEntry1] : T2DM in patient with HTN, HLD and obesity. has not followed up with us in more than a year and he stopped insulin due to running out. \par \par DM2 : cgm report revised : taregt bgs 21 %, high 55%, very hig h24% \par increase Humalog to 8 u tID  w meals.  \par increase basaglar to 24 u HS.  \par continue MF  1000 mg BID \par rtc in 4-5 weeks to download sandro \par eye exam done  \par has neuropathy : cont  B12 daily \par discussed diet and exercise\par Needs to try to have more protein for meals\par cont ASA daily \par cut down on carb rich meals. no rice, bread ,  pasta \par cont sandro \par \par hld: will retrieve labs from lab\par continue current management. statin \par low fat/low cholesterol diet and weight loss advised\par \par htn:  bp at [facility] on meds. Continue current meds \par I advised low fat/low cholesterol diet, low salt diet, and weight loss\par

## 2021-11-02 NOTE — HISTORY OF PRESENT ILLNESS
[de-identified] : The patient comes in today with complaints to the left knee.  It has been going on for the last several months, getting a little worse recently.  The patient describes the pain as throbbing.  The patient states walking and bending makes the symptoms worse.  [de-identified] : Aspirin

## 2021-11-02 NOTE — CONSULT LETTER
[Dear  ___] : Dear  [unfilled], [Consult Letter:] : I had the pleasure of evaluating your patient, [unfilled]. [Please see my note below.] : Please see my note below. [Consult Closing:] : Thank you very much for allowing me to participate in the care of this patient.  If you have any questions, please do not hesitate to contact me. [Sincerely,] : Sincerely, [FreeTextEntry3] : Francis Wheatley III, MD \par GERMÁN/arjun

## 2021-11-02 NOTE — DISCUSSION/SUMMARY
[de-identified] : The patient presents with osteoarthritis and possible meniscal tear, left knee.  At this time I recommend rest, ice, anti-inflammatory and physical therapy.  He will be reassessed in three or four weeks.

## 2021-11-02 NOTE — ADDENDUM
[FreeTextEntry1] : This note was written by Yesenia Lassiter on 11/02/2021 acting as scribe for Francis Wheatley III, MD

## 2021-11-02 NOTE — PHYSICAL EXAM
[de-identified] : RIght knee:\par Knee: Range of Motion in Degrees	\par 	                  Claimant:	Normal:	\par Flexion Active	  135 	                135-degrees	\par Flexion Passive	  135	                135-degrees	\par Extension Active	  0-5	                0-5-degrees	\par Extension Passive	  0-5	                0-5-degrees	\par \par No weakness to flexion/extension.  No evidence of instability in the AP plane or varus or valgus stress.  Negative  Lachman.  Negative pivot shift.  Negative anterior drawer test.  Negative posterior drawer test.  Negative Lisa.  Negative Apley grind.  No medial or lateral joint line tenderness.  No tenderness over the medial and lateral facet of the patella.  No patellofemoral crepitations.  No lateral tilting patella.  No patellar apprehension.  No crepitation in the medial and lateral femoral condyle.  No proximal or distal swelling, edema or tenderness.  No gross motor or sensory deficits.  No intra-articular swelling.  2+ DP and PT pulses. No varus or valgus malalignment.  Skin is intact.  No rashes, scars or lesions.  \par  \par Left knee:\par Knee: Range of Motion in Degrees	\par 	                  Claimant:	Normal:	\par Flexion Active	  135 	               135-degrees	\par Flexion Passive	  135	               135-degrees	\par Extension Active	  0-5	               0-5-degrees	\par Extension Passive     0-5	               0-5-degrees	\par \par No weakness to flexion/extension.  No evidence of instability in the AP plane or varus or valgus stress.  Negative  Lachman.  Negative pivot shift.  Negative anterior drawer test.  Negative posterior drawer test.  Positive Lisa.  Positive Apley grind.  Positive medial joint line tenderness.  Negative lateral joint line tenderness.  Positive tenderness over the medial and lateral facet of the patella.  Positive patellofemoral crepitations.  No lateral tilting patella.  No patellar apprehension.  Positive crepitation in the medial and lateral femoral condyle.  No proximal or distal swelling, edema or tenderness.  No gross motor or sensory deficits.  Mild intra-articular swelling.  2+ DP and PT pulses.  No varus or valgus malalignment.  Skin is intact.  No rashes, scars or lesions.  [de-identified] : Gait: Slightly antalgic to antalgic gait.  Station: Normal  [de-identified] : Appearance: Well-developed, well-nourished male  in no acute distress.  [de-identified] : Radiographs, one to two views of the left knee, show moderate degenerative change.

## 2021-11-04 ENCOUNTER — APPOINTMENT (OUTPATIENT)
Dept: ORTHOPEDIC SURGERY | Facility: CLINIC | Age: 71
End: 2021-11-04

## 2021-11-04 RX ORDER — BLOOD SUGAR DIAGNOSTIC
STRIP MISCELLANEOUS
Qty: 3 | Refills: 1 | Status: ACTIVE | COMMUNITY
Start: 2021-11-04 | End: 1900-01-01

## 2021-11-09 RX ORDER — LANCETS 28 GAUGE
EACH MISCELLANEOUS
Qty: 300 | Refills: 1 | Status: ACTIVE | COMMUNITY
Start: 2021-11-09 | End: 1900-01-01

## 2021-11-12 ENCOUNTER — NON-APPOINTMENT (OUTPATIENT)
Age: 71
End: 2021-11-12

## 2021-11-12 ENCOUNTER — APPOINTMENT (OUTPATIENT)
Dept: CARDIOLOGY | Facility: CLINIC | Age: 71
End: 2021-11-12
Payer: MEDICARE

## 2021-11-12 VITALS
DIASTOLIC BLOOD PRESSURE: 72 MMHG | WEIGHT: 210 LBS | HEIGHT: 69 IN | OXYGEN SATURATION: 98 % | BODY MASS INDEX: 31.1 KG/M2 | SYSTOLIC BLOOD PRESSURE: 162 MMHG | HEART RATE: 84 BPM

## 2021-11-12 VITALS — DIASTOLIC BLOOD PRESSURE: 60 MMHG | SYSTOLIC BLOOD PRESSURE: 126 MMHG

## 2021-11-12 PROCEDURE — 99214 OFFICE O/P EST MOD 30 MIN: CPT

## 2021-11-12 PROCEDURE — 93000 ELECTROCARDIOGRAM COMPLETE: CPT

## 2021-11-12 RX ORDER — METHYLPREDNISOLONE 4 MG/1
4 TABLET ORAL
Qty: 1 | Refills: 0 | Status: DISCONTINUED | COMMUNITY
Start: 2021-10-18 | End: 2021-11-12

## 2021-11-12 NOTE — CARDIOLOGY SUMMARY
[de-identified] : 11/12/21 sinus rhythm  non specific T changes  [de-identified] : 8/20/19 Normal nuclear stress test  [de-identified] : 8/26/21  normal EF mild DD trace AI  [de-identified] : 2014,2019 PAtent stents LAD ,LCX RCA, 80%D1,2019 3 V disease with normal function \par \par  [de-identified] : 2019 CABGx4 with SVG to PDA, OM1, Diag,  and LIMA to LAD

## 2021-11-12 NOTE — DISCUSSION/SUMMARY
[FreeTextEntry1] : Patient with above hx   came for follow up  without  any active symptoms \par \par CAD/CABG:  patient asymptomatic cardiac wise , with good exercise capacity , continue statin , asa plvix ,, \par HTN   controlled  , continue low salt diet and medication , encourage to stay hydrated \par \par Hyperlipidemia:  uncontrolled due to uncontrolled DM \par \par Diabetes mellitus:   uncontrolled ,   [place] , repeat blood work \par \par Obesity : advised the patient to exercise , diet restriction  loose weight \par \par follow up after 4 months \par

## 2021-11-12 NOTE — PHYSICAL EXAM
[Obese] : obese [Normal Conjunctiva] : normal conjunctiva [Normal Venous Pressure] : normal venous pressure [No Carotid Bruit] : no carotid bruit [No Gallop] : no gallop [5th Left ICS - MCL] : palpated at the 5th LICS in the midclavicular line [Normal] : normal [No Precordial Heave] : no precordial heave was noted [Normal Rate] : normal [Rhythm Regular] : regular [Normal S1] : normal S1 [Normal S2] : normal S2 [II] : a grade 2 [I] : a grade 1 [2+] : left 2+ [Clear Lung Fields] : clear lung fields [Good Air Entry] : good air entry [No Respiratory Distress] : no respiratory distress  [Soft] : abdomen soft [Non Tender] : non-tender [No Masses/organomegaly] : no masses/organomegaly [Normal Bowel Sounds] : normal bowel sounds [Normal Gait] : normal gait [No Edema] : no edema [No Cyanosis] : no cyanosis [No Clubbing] : no clubbing [No Varicosities] : no varicosities [No Rash] : no rash [No Skin Lesions] : no skin lesions [Moves all extremities] : moves all extremities [No Focal Deficits] : no focal deficits [Normal Speech] : normal speech [Alert and Oriented] : alert and oriented [Normal memory] : normal memory [Rt] : no varicose veins of the right leg [Lt] : no varicose veins of the left leg [Right Carotid Bruit] : no bruit heard over the right carotid [Left Carotid Bruit] : no bruit heard over the left carotid [Bruit] : no bruit heard

## 2021-11-12 NOTE — HISTORY OF PRESENT ILLNESS
[FreeTextEntry1] : 71 year old male with hx CAD CABG DM HTN came for follow up ,says he is doing ok \par  Denies chest pain, shortness of breath, dyspnea on exertion, palpitations, orthopnea, paroxysmal nocturnal dyspnea, claudication, dizziness, lightheadedness, presyncopal or syncopal symptoms. \par \par Patient  blood pressure is controlled , blood sugar is controlled , recent blood work showed uncontrolled sugar ,  HB A1c   elevated Cholesterol \par \par  did have blood work two days ago ,pending result

## 2021-11-16 LAB
ALBUMIN SERPL ELPH-MCNC: 4.3 G/DL
ALP BLD-CCNC: 123 U/L
ALT SERPL-CCNC: 23 U/L
ANION GAP SERPL CALC-SCNC: 15 MMOL/L
AST SERPL-CCNC: 18 U/L
BASOPHILS # BLD AUTO: 0.03 K/UL
BASOPHILS NFR BLD AUTO: 0.4 %
BILIRUB SERPL-MCNC: 0.6 MG/DL
BUN SERPL-MCNC: 15 MG/DL
CALCIUM SERPL-MCNC: 9.8 MG/DL
CHLORIDE SERPL-SCNC: 101 MMOL/L
CHOLEST SERPL-MCNC: 159 MG/DL
CO2 SERPL-SCNC: 24 MMOL/L
CREAT SERPL-MCNC: 1.05 MG/DL
EOSINOPHIL # BLD AUTO: 0.43 K/UL
EOSINOPHIL NFR BLD AUTO: 5.8 %
ESTIMATED AVERAGE GLUCOSE: 197 MG/DL
GLUCOSE SERPL-MCNC: 143 MG/DL
HBA1C MFR BLD HPLC: 8.5 %
HCT VFR BLD CALC: 49.4 %
HDLC SERPL-MCNC: 34 MG/DL
HGB BLD-MCNC: 15.4 G/DL
IMM GRANULOCYTES NFR BLD AUTO: 0.3 %
LDLC SERPL CALC-MCNC: 91 MG/DL
LDLC SERPL DIRECT ASSAY-MCNC: 101 MG/DL
LYMPHOCYTES # BLD AUTO: 2.55 K/UL
LYMPHOCYTES NFR BLD AUTO: 34.6 %
MAN DIFF?: NORMAL
MCHC RBC-ENTMCNC: 26.2 PG
MCHC RBC-ENTMCNC: 31.2 GM/DL
MCV RBC AUTO: 84.2 FL
MONOCYTES # BLD AUTO: 0.53 K/UL
MONOCYTES NFR BLD AUTO: 7.2 %
NEUTROPHILS # BLD AUTO: 3.8 K/UL
NEUTROPHILS NFR BLD AUTO: 51.7 %
NONHDLC SERPL-MCNC: 125 MG/DL
PLATELET # BLD AUTO: 146 K/UL
POTASSIUM SERPL-SCNC: 4.6 MMOL/L
PROT SERPL-MCNC: 7 G/DL
RBC # BLD: 5.87 M/UL
RBC # FLD: 19.8 %
SODIUM SERPL-SCNC: 139 MMOL/L
TRIGL SERPL-MCNC: 170 MG/DL
WBC # FLD AUTO: 7.36 K/UL

## 2021-11-17 LAB
CREAT SPEC-SCNC: 90 MG/DL
MICROALBUMIN 24H UR DL<=1MG/L-MCNC: <1.2 MG/DL
MICROALBUMIN/CREAT 24H UR-RTO: NORMAL MG/G

## 2021-11-22 ENCOUNTER — APPOINTMENT (OUTPATIENT)
Dept: ENDOCRINOLOGY | Facility: CLINIC | Age: 71
End: 2021-11-22
Payer: MEDICARE

## 2021-11-22 PROCEDURE — G0108 DIAB MANAGE TRN  PER INDIV: CPT

## 2021-11-22 RX ORDER — BLOOD SUGAR DIAGNOSTIC
STRIP MISCELLANEOUS
Qty: 300 | Refills: 1 | Status: ACTIVE | COMMUNITY
Start: 2021-11-22 | End: 1900-01-01

## 2021-11-22 RX ORDER — PEN NEEDLE, DIABETIC 29 G X1/2"
31G X 5 MM NEEDLE, DISPOSABLE MISCELLANEOUS
Qty: 400 | Refills: 1 | Status: ACTIVE | COMMUNITY
Start: 2021-11-22 | End: 1900-01-01

## 2021-11-29 LAB
25(OH)D3 SERPL-MCNC: 24.6 NG/ML
ALBUMIN SERPL ELPH-MCNC: 4.3 G/DL
ALP BLD-CCNC: 126 U/L
ALT SERPL-CCNC: 23 U/L
ANION GAP SERPL CALC-SCNC: 15 MMOL/L
AST SERPL-CCNC: 18 U/L
BASOPHILS # BLD AUTO: 0.04 K/UL
BASOPHILS NFR BLD AUTO: 0.5 %
BILIRUB SERPL-MCNC: 0.6 MG/DL
BUN SERPL-MCNC: 15 MG/DL
CALCIUM SERPL-MCNC: 9.8 MG/DL
CHLORIDE SERPL-SCNC: 101 MMOL/L
CHOLEST SERPL-MCNC: 164 MG/DL
CO2 SERPL-SCNC: 24 MMOL/L
CREAT SERPL-MCNC: 1.03 MG/DL
EOSINOPHIL # BLD AUTO: 0.37 K/UL
EOSINOPHIL NFR BLD AUTO: 5 %
ESTIMATED AVERAGE GLUCOSE: 194 MG/DL
FERRITIN SERPL-MCNC: 57 NG/ML
FOLATE SERPL-MCNC: 13.5 NG/ML
GLUCOSE SERPL-MCNC: 148 MG/DL
HBA1C MFR BLD HPLC: 8.4 %
HCT VFR BLD CALC: 49.1 %
HDLC SERPL-MCNC: 34 MG/DL
HGB BLD-MCNC: 15.2 G/DL
IMM GRANULOCYTES NFR BLD AUTO: 0.1 %
IRON SERPL-MCNC: 92 UG/DL
LDLC SERPL CALC-MCNC: 96 MG/DL
LYMPHOCYTES # BLD AUTO: 2.52 K/UL
LYMPHOCYTES NFR BLD AUTO: 33.7 %
MAN DIFF?: NORMAL
MCHC RBC-ENTMCNC: 25.8 PG
MCHC RBC-ENTMCNC: 31 GM/DL
MCV RBC AUTO: 83.4 FL
MONOCYTES # BLD AUTO: 0.52 K/UL
MONOCYTES NFR BLD AUTO: 7 %
NEUTROPHILS # BLD AUTO: 4.01 K/UL
NEUTROPHILS NFR BLD AUTO: 53.7 %
NONHDLC SERPL-MCNC: 130 MG/DL
PLATELET # BLD AUTO: 148 K/UL
POTASSIUM SERPL-SCNC: 4.8 MMOL/L
PROT SERPL-MCNC: 7.1 G/DL
RBC # BLD: 5.89 M/UL
RBC # FLD: 19.3 %
SODIUM SERPL-SCNC: 140 MMOL/L
TRIGL SERPL-MCNC: 171 MG/DL
VIT B12 SERPL-MCNC: 296 PG/ML
WBC # FLD AUTO: 7.47 K/UL

## 2021-12-28 ENCOUNTER — RX RENEWAL (OUTPATIENT)
Age: 71
End: 2021-12-28

## 2022-03-07 ENCOUNTER — APPOINTMENT (OUTPATIENT)
Dept: OTOLARYNGOLOGY | Facility: CLINIC | Age: 72
End: 2022-03-07

## 2022-04-05 NOTE — ASU PREOP CHECKLIST - VIA
04/05/22 Cardiac Monitor was placed on patient.  Patient tolerated well and verbalized understanding of instructions..  Monitor Type: 14 day [employer]  Monitor serial number: [id number]  Marge Vera CNA         
stretcher

## 2022-04-07 ENCOUNTER — APPOINTMENT (OUTPATIENT)
Dept: CARDIOLOGY | Facility: CLINIC | Age: 72
End: 2022-04-07

## 2022-04-08 ENCOUNTER — APPOINTMENT (OUTPATIENT)
Dept: ENDOCRINOLOGY | Facility: CLINIC | Age: 72
End: 2022-04-08

## 2022-06-07 ENCOUNTER — NON-APPOINTMENT (OUTPATIENT)
Age: 72
End: 2022-06-07

## 2022-06-07 ENCOUNTER — APPOINTMENT (OUTPATIENT)
Dept: CARDIOLOGY | Facility: CLINIC | Age: 72
End: 2022-06-07
Payer: MEDICARE

## 2022-06-07 VITALS
DIASTOLIC BLOOD PRESSURE: 50 MMHG | BODY MASS INDEX: 31.4 KG/M2 | OXYGEN SATURATION: 99 % | HEIGHT: 69 IN | WEIGHT: 212 LBS | HEART RATE: 63 BPM | SYSTOLIC BLOOD PRESSURE: 122 MMHG | RESPIRATION RATE: 16 BRPM | TEMPERATURE: 98.4 F

## 2022-06-07 DIAGNOSIS — E78.5 HYPERLIPIDEMIA, UNSPECIFIED: ICD-10-CM

## 2022-06-07 DIAGNOSIS — K21.9 GASTRO-ESOPHAGEAL REFLUX DISEASE W/OUT ESOPHAGITIS: ICD-10-CM

## 2022-06-07 PROCEDURE — 93000 ELECTROCARDIOGRAM COMPLETE: CPT

## 2022-06-07 PROCEDURE — 99214 OFFICE O/P EST MOD 30 MIN: CPT

## 2022-06-07 RX ORDER — ASPIRIN 81 MG
81 TABLET, DELAYED RELEASE (ENTERIC COATED) ORAL DAILY
Qty: 1 | Refills: 0 | Status: ACTIVE | COMMUNITY

## 2022-06-07 RX ORDER — METOPROLOL TARTRATE 25 MG/1
25 TABLET, FILM COATED ORAL
Qty: 90 | Refills: 3 | Status: ACTIVE | COMMUNITY
Start: 2020-09-08 | End: 1900-01-01

## 2022-06-07 NOTE — HISTORY OF PRESENT ILLNESS
[FreeTextEntry1] : 72 year old male with hx CAD CABG DM HTN came for follow up ,says he is doing ok \par  Denies chest pain, shortness of breath, dyspnea on exertion, palpitations, orthopnea, paroxysmal nocturnal dyspnea, claudication, dizziness, lightheadedness, presyncopal or syncopal symptoms. \par \par \par \par Patient  blood pressure is controlled , recent blood work showed uncontrolled sugar ,  HB A1c   elevated  TG \par  blood work   lipids HDL 34    LDL 96   Hb a1c 8.5 %

## 2022-06-07 NOTE — CARDIOLOGY SUMMARY
[de-identified] : 6/7/22  sinus rhythm  non specific T changes  [de-identified] : 8/20/19 Normal nuclear stress test  [de-identified] : 8/26/21  normal EF mild DD trace AI  [de-identified] : 2014,2019 PAtent stents LAD ,LCX RCA, 80%D1,2019 3 V disease with normal function \par \par  [de-identified] : 2019 CABGx4 with SVG to PDA, OM1, Diag,  and LIMA to LAD

## 2022-06-07 NOTE — PHYSICAL EXAM
[Rt] : no varicose veins of the right leg [Lt] : no varicose veins of the left leg [Right Carotid Bruit] : no bruit heard over the right carotid [Left Carotid Bruit] : no bruit heard over the left carotid [Bruit] : no bruit heard

## 2022-06-07 NOTE — REVIEW OF SYSTEMS
[Abdominal Pain] : no abdominal pain [Change in Appetite] : no change in appetite [Constipation] : no constipation [Urinary Frequency] : no change in urinary frequency [Erectile Dysfunction] : no erectile dysfunction

## 2022-06-09 LAB
25(OH)D3 SERPL-MCNC: 18.8 NG/ML
ALBUMIN SERPL ELPH-MCNC: 4.3 G/DL
ALP BLD-CCNC: 130 U/L
ALT SERPL-CCNC: 30 U/L
ANION GAP SERPL CALC-SCNC: 15 MMOL/L
APPEARANCE: CLEAR
AST SERPL-CCNC: 24 U/L
BASOPHILS # BLD AUTO: 0.03 K/UL
BASOPHILS NFR BLD AUTO: 0.4 %
BILIRUB SERPL-MCNC: 0.5 MG/DL
BILIRUBIN URINE: NEGATIVE
BLOOD URINE: NEGATIVE
BUN SERPL-MCNC: 17 MG/DL
CALCIUM SERPL-MCNC: 9.3 MG/DL
CHLORIDE SERPL-SCNC: 101 MMOL/L
CHOLEST SERPL-MCNC: 184 MG/DL
CO2 SERPL-SCNC: 22 MMOL/L
COLOR: YELLOW
CREAT SERPL-MCNC: 1.02 MG/DL
EGFR: 78 ML/MIN/1.73M2
EOSINOPHIL # BLD AUTO: 0.32 K/UL
EOSINOPHIL NFR BLD AUTO: 4.4 %
ESTIMATED AVERAGE GLUCOSE: 223 MG/DL
GLUCOSE QUALITATIVE U: ABNORMAL
GLUCOSE SERPL-MCNC: 191 MG/DL
HBA1C MFR BLD HPLC: 9.4 %
HCT VFR BLD CALC: 46.4 %
HDLC SERPL-MCNC: 41 MG/DL
HGB BLD-MCNC: 14.9 G/DL
IMM GRANULOCYTES NFR BLD AUTO: 0.5 %
KETONES URINE: NEGATIVE
LDLC SERPL CALC-MCNC: 109 MG/DL
LEUKOCYTE ESTERASE URINE: NEGATIVE
LYMPHOCYTES # BLD AUTO: 2.54 K/UL
LYMPHOCYTES NFR BLD AUTO: 34.6 %
MAN DIFF?: NORMAL
MCHC RBC-ENTMCNC: 28.3 PG
MCHC RBC-ENTMCNC: 32.1 GM/DL
MCV RBC AUTO: 88.2 FL
MONOCYTES # BLD AUTO: 0.49 K/UL
MONOCYTES NFR BLD AUTO: 6.7 %
NEUTROPHILS # BLD AUTO: 3.93 K/UL
NEUTROPHILS NFR BLD AUTO: 53.4 %
NITRITE URINE: NEGATIVE
NONHDLC SERPL-MCNC: 143 MG/DL
PH URINE: 6
PLATELET # BLD AUTO: 155 K/UL
POTASSIUM SERPL-SCNC: 4.6 MMOL/L
PROT SERPL-MCNC: 7.2 G/DL
PROTEIN URINE: NORMAL
PSA FREE FLD-MCNC: 42 %
PSA FREE SERPL-MCNC: 0.19 NG/ML
PSA SERPL-MCNC: 0.46 NG/ML
RBC # BLD: 5.26 M/UL
RBC # FLD: 14.6 %
SODIUM SERPL-SCNC: 137 MMOL/L
SPECIFIC GRAVITY URINE: 1.02
TRIGL SERPL-MCNC: 173 MG/DL
TSH SERPL-ACNC: 3.24 UIU/ML
UROBILINOGEN URINE: NORMAL
WBC # FLD AUTO: 7.35 K/UL

## 2022-06-15 ENCOUNTER — APPOINTMENT (OUTPATIENT)
Dept: OTOLARYNGOLOGY | Facility: CLINIC | Age: 72
End: 2022-06-15
Payer: MEDICARE

## 2022-06-15 VITALS — WEIGHT: 210 LBS | BODY MASS INDEX: 31.83 KG/M2 | HEIGHT: 68 IN | TEMPERATURE: 96.4 F

## 2022-06-15 DIAGNOSIS — H61.301 ACQUIRED STENOSIS OF RIGHT EXTERNAL EAR CANAL, UNSPECIFIED: ICD-10-CM

## 2022-06-15 PROCEDURE — 99213 OFFICE O/P EST LOW 20 MIN: CPT | Mod: 25

## 2022-06-15 PROCEDURE — 69210 REMOVE IMPACTED EAR WAX UNI: CPT

## 2022-06-15 NOTE — REASON FOR VISIT
[Subsequent Evaluation] : a subsequent evaluation for [Hearing Loss] : hearing loss [Tinnitus] : tinnitus [FreeTextEntry2] : ears

## 2022-06-15 NOTE — PROCEDURE
[Cerumen Impaction] : Cerumen Impaction [FreeTextEntry6] : Large amount cerumen cleared left and right ear instrumentation with curettes, forceps and suction.\par Ear canals and tympanic membranes  unremarkable.\par au canal moderate stenosis

## 2022-06-15 NOTE — ASSESSMENT
[FreeTextEntry1] : canal stenosis\par cerumen cleared au\par tinnitus and sn loss stable\par reviewed option of aids for loss and help w tinnitus\par pt to consider\par fu 6 mo for cleaning and fu audio

## 2022-07-07 NOTE — CONSULT NOTE ADULT - PROVIDER SPECIALTY LIST ADULT
Pt has history of CHF, paged MAU Salmeron Rife to confirm pt's IV fluid order. Per verbal order follow current fluid order due to pt's MAXIMILIANO and to reevaluate in the am. Will continue to monitor pt.    CT Surgery

## 2022-07-27 NOTE — PROGRESS NOTE ADULT - PROBLEM SELECTOR PROBLEM 3
From: Mary Amaro  To: Dr. Fairchild Offer  Sent: 7/25/2022 7:48 AM EDT  Subject: Labs    Hi there, just letting you know I had labs done this morning at 7. Other hyperlipidemia

## 2022-07-31 NOTE — PATIENT PROFILE ADULT - FALL HARM RISK
"     Source of History:  pt    Chief complaint:  Cyst (Pt advised on Wednesday 07/27/2022 he noticed a cyst on his right elbow - pt states he has a chronic problem with cysts )      HPI:  Felice Humphries is a 55 y.o. male presenting with redness of posterior right elbow.  Concerned because he has had recurrent staph infections before.  He does not recall any trauma to the elbow.  No pain with range of motion.  No fevers or systemic symptoms.  No other rash or lesions.  Reports he has been clean from illegal substances for 7-8 months.    This is the extent to the patients complaints today here in the emergency department.    ROS: As per HPI and below:  General: No fever.  No chills.  Eyes: No visual changes.   ENT: No sore throat. No ear pain.  Urinary: No abnormal urination.  MSK: No back pain. No joint pain.   Integument:  As per HPI      Review of patient's allergies indicates:   Allergen Reactions    Bactrim [sulfamethoxazole-trimethoprim] Other (See Comments)     Flushed skin, MSK pain       PMH:  As per HPI and below:  Past Medical History:   Diagnosis Date    Alcohol abuse     Anxiety     Chronic back pain     Drug use     High cholesterol     Hypertension      Past Surgical History:   Procedure Laterality Date    EXTERNAL EAR SURGERY      INCISION AND DRAINAGE OF KNEE Right 11/23/2020    Procedure: INCISION AND DRAINAGE, KNEE;  Surgeon: Ha Guerra MD;  Location: [facility];  Service: Orthopedics;  Laterality: Right;       Social History     Tobacco Use    Smoking status: Never Smoker    Smokeless tobacco: Never Used   Substance Use Topics    Alcohol use: Yes     Alcohol/week: 8.0 standard drinks     Types: 8 Shots of liquor per week     Comment: Reports drinking excessively 3 times per week on average.    Drug use: Yes     Types: Codeine       Physical Exam:    /75 (BP Location: Left arm, Patient Position: Sitting)   Pulse 87   Temp 98.2 °F (36.8 °C) (Oral)   Resp 16   Ht 6' 2" (1.88 m)  "  Wt 95.3 kg (210 lb)   SpO2 98%   BMI 26.96 kg/m²   Nursing note and vital signs reviewed.  Appearance: No acute distress.  Eyes: No conjunctival injection.  ENT: Normal phonation.  Musculoskeletal: Good range of motion all joints.  No deformities.  7 x 4 cm area posterior elbow, which has both an erythematous appearance and violaceous appearance.  Minimal warmth.  Two small abrasions but no abscess or fluctuance.  No effusion.  Unclear if this discoloration represents subacute ecchymosis or developing cellulitis.  Neck supple.  No meningismus. Neurovascularly intact.  Skin: No rashes seen.  Good turgor.  No abrasions.  No ecchymoses.  Mental Status:  Alert and oriented x 3.  Appropriate, conversant.    Labs that have been ordered have been independently reviewed and interpreted by myself.        MDM/ Differential Dx:    55 y.o. male with history of MRSA in the past, now with erythema over the olecranon.  Does not appear to be olecranon bursitis.  Clinically no evidence of septic joint.  Will start course of antibiotics with doxycycline as he is allergic to sulfa.  Discussed return precautions.                 Diagnostic Impression:    1. Cellulitis of right elbow    2. History of MRSA infection         ED Disposition Condition    Discharge Stable          ED Prescriptions     Medication Sig Dispense Start Date End Date Auth. Provider    doxycycline (VIBRAMYCIN) 100 MG Cap Take 1 capsule (100 mg total) by mouth 2 (two) times daily. for 10 days 20 capsule 7/30/2022 8/9/2022 Ulises Mckinnon II, MD        Follow-up Information     Follow up With Specialties Details Why Contact Info    Sydnie Leija MD Family Medicine In 3 days For wound re-check [id number] [facility] [id number] [phone]             Ulises Mckinnon II, MD  07/31/22 6422     pt is independent

## 2022-08-09 ENCOUNTER — OUTPATIENT (OUTPATIENT)
Dept: OUTPATIENT SERVICES | Facility: HOSPITAL | Age: 72
LOS: 1 days | End: 2022-08-09
Payer: MEDICARE

## 2022-08-09 ENCOUNTER — APPOINTMENT (OUTPATIENT)
Dept: ULTRASOUND IMAGING | Facility: CLINIC | Age: 72
End: 2022-08-09

## 2022-08-09 DIAGNOSIS — Z95.1 PRESENCE OF AORTOCORONARY BYPASS GRAFT: Chronic | ICD-10-CM

## 2022-08-09 DIAGNOSIS — Z00.8 ENCOUNTER FOR OTHER GENERAL EXAMINATION: ICD-10-CM

## 2022-08-09 DIAGNOSIS — Z98.890 OTHER SPECIFIED POSTPROCEDURAL STATES: Chronic | ICD-10-CM

## 2022-08-09 DIAGNOSIS — Z98.49 CATARACT EXTRACTION STATUS, UNSPECIFIED EYE: Chronic | ICD-10-CM

## 2022-08-09 PROCEDURE — 76700 US EXAM ABDOM COMPLETE: CPT | Mod: 26

## 2022-08-09 PROCEDURE — 76700 US EXAM ABDOM COMPLETE: CPT

## 2022-09-01 ENCOUNTER — APPOINTMENT (OUTPATIENT)
Dept: ENDOCRINOLOGY | Facility: CLINIC | Age: 72
End: 2022-09-01

## 2022-09-17 NOTE — HISTORY OF PRESENT ILLNESS
[FreeTextEntry1] : 72 year old male with hx CAD CABG DM HTN came for follow up ,says he is doing ok \par  Denies chest pain, shortness of breath, dyspnea on exertion, palpitations, orthopnea, paroxysmal nocturnal dyspnea, claudication, dizziness, lightheadedness, presyncopal or syncopal symptoms. \par \par \par \par Patient  blood pressure is controlled , recent blood work showed uncontrolled sugar ,  HB A1c  9.4    lipids HDL 41     , low Vitamin D  done in june 2022

## 2022-09-17 NOTE — DISCUSSION/SUMMARY
[FreeTextEntry1] : Patient with above hx   came for follow up  without  any active symptoms \par \par CAD/CABG:  patient asymptomatic cardiac wise , with good exercise capacity , continue statin , asa plvix \par  \par HTN   controlled  , continue low salt diet and medication , encourage to stay hydrated \par \par Hyperlipidemia:    ,uncontrolled due to uncontrolled DM \par \par Diabetes mellitus:   uncontrolled ,   [place] , repeat blood work \par \par Obesity : advised the patient to exercise , diet restriction  loose weight \par \par follow up after 4 months \par

## 2022-09-17 NOTE — CARDIOLOGY SUMMARY
[de-identified] : 6/7/22  sinus rhythm  non specific T changes  [de-identified] : 8/20/19 Normal nuclear stress test  [de-identified] : 8/26/21  normal EF mild DD trace AI  [de-identified] : 2014,2019 PAtent stents LAD ,LCX RCA, 80%D1,2019 3 V disease with normal function \par \par  [de-identified] : 2019 CABGx4 with SVG to PDA, OM1, Diag,  and LIMA to LAD

## 2022-09-17 NOTE — REVIEW OF SYSTEMS
[Negative] : Heme/Lymph [Abdominal Pain] : no abdominal pain [Change in Appetite] : no change in appetite [Constipation] : no constipation [Urinary Frequency] : no change in urinary frequency [Erectile Dysfunction] : no erectile dysfunction

## 2022-09-19 ENCOUNTER — APPOINTMENT (OUTPATIENT)
Dept: CARDIOLOGY | Facility: CLINIC | Age: 72
End: 2022-09-19

## 2022-10-18 ENCOUNTER — RX RENEWAL (OUTPATIENT)
Age: 72
End: 2022-10-18

## 2023-01-10 ENCOUNTER — RX RENEWAL (OUTPATIENT)
Age: 73
End: 2023-01-10

## 2023-01-10 RX ORDER — CLOPIDOGREL BISULFATE 75 MG/1
75 TABLET, FILM COATED ORAL DAILY
Qty: 90 | Refills: 1 | Status: ACTIVE | COMMUNITY
Start: 2020-12-09 | End: 1900-01-01

## 2023-04-05 ENCOUNTER — APPOINTMENT (OUTPATIENT)
Dept: ULTRASOUND IMAGING | Facility: CLINIC | Age: 73
End: 2023-04-05

## 2023-04-11 ENCOUNTER — APPOINTMENT (OUTPATIENT)
Dept: OTOLARYNGOLOGY | Facility: CLINIC | Age: 73
End: 2023-04-11
Payer: MEDICARE

## 2023-04-11 VITALS — WEIGHT: 212 LBS | BODY MASS INDEX: 32.13 KG/M2 | TEMPERATURE: 97.2 F | HEIGHT: 68 IN

## 2023-04-11 DIAGNOSIS — H90.3 SENSORINEURAL HEARING LOSS, BILATERAL: ICD-10-CM

## 2023-04-11 DIAGNOSIS — R49.0 DYSPHONIA: ICD-10-CM

## 2023-04-11 DIAGNOSIS — H61.23 IMPACTED CERUMEN, BILATERAL: ICD-10-CM

## 2023-04-11 DIAGNOSIS — H69.83 OTHER SPECIFIED DISORDERS OF EUSTACHIAN TUBE, BILATERAL: ICD-10-CM

## 2023-04-11 DIAGNOSIS — J31.2 CHRONIC PHARYNGITIS: ICD-10-CM

## 2023-04-11 DIAGNOSIS — H93.13 TINNITUS, BILATERAL: ICD-10-CM

## 2023-04-11 PROCEDURE — 92567 TYMPANOMETRY: CPT

## 2023-04-11 PROCEDURE — 92557 COMPREHENSIVE HEARING TEST: CPT

## 2023-04-11 PROCEDURE — 31575 DIAGNOSTIC LARYNGOSCOPY: CPT

## 2023-04-11 PROCEDURE — G0268 REMOVAL OF IMPACTED WAX MD: CPT

## 2023-04-11 PROCEDURE — 99214 OFFICE O/P EST MOD 30 MIN: CPT | Mod: 25

## 2023-04-11 NOTE — PHYSICAL EXAM
[Midline] : trachea located in midline position [Laryngoscopy Performed] : laryngoscopy was performed, see procedure section for findings [Normal] : no rashes [de-identified] : taj [de-identified] : 8 mm left tonsil fossa retension cyst

## 2023-04-11 NOTE — HISTORY OF PRESENT ILLNESS
[de-identified] : co noise in ears\par hx hearing loss co decrease hearing now\par recent throat ie redness throat\par no reflux, co lower voice\par neg tobac

## 2023-04-11 NOTE — PROCEDURE
[Cerumen Impaction] : Cerumen Impaction [de-identified] : Indication for procedure:Unable to examine laryngeal structures with mirror exam.  Difficulty w persistent throat discomfort and excessive\par throat clearing\par The patient has reduced voice volume\par \par Scope # \par Topical anesthesia with viscous xylocaine 2% is applied to the anterior nares.\par A flexible fiberoptic laryngoscope is than introduced through the nares.\par The nasopharynx is clear without mass or inflammation.\par The posterior pharyngeal wall is unremarkable.\par The tongue base and vallecula are unremarkable.  The hypopharynx is unremarkable and unobstructed.\par The supraglottic larynx is within normal limits.\par Both vocal cords are fully mobile with no nodule, polyp or other lesion present.\par There is no edema or erythema overlying the arytenoid cartilages or the inter-arytenoid space.\par The subglottic space is clear.\par The voice has a normal quality.\par  [FreeTextEntry6] : Indication: ear plugging and discomfort\par Large amount cerumen cleared left and right ear instrumentation with curettes, forceps and suction.\par Ear canals and tympanic membranes  unremarkable.\par

## 2023-04-11 NOTE — ASSESSMENT
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

## 2023-04-11 NOTE — REVIEW OF SYSTEMS
[Hearing Loss] : hearing loss [Ear Noises] : ear noises [Nasal Congestion] : nasal congestion [Sinus Pain] : sinus pain [Sinus Pressure] : sinus pressure [Sense Of Smell Problem] : sense of smell problem [Discolored Nasal Discharge] : discolored nasal discharge [Negative] : Heme/Lymph [Patient Intake Form Reviewed] : Patient intake form was reviewed

## 2023-04-19 ENCOUNTER — OUTPATIENT (OUTPATIENT)
Dept: OUTPATIENT SERVICES | Facility: HOSPITAL | Age: 73
LOS: 1 days | End: 2023-04-19
Payer: MEDICARE

## 2023-04-19 ENCOUNTER — APPOINTMENT (OUTPATIENT)
Dept: MRI IMAGING | Facility: CLINIC | Age: 73
End: 2023-04-19
Payer: MEDICARE

## 2023-04-19 DIAGNOSIS — Z95.1 PRESENCE OF AORTOCORONARY BYPASS GRAFT: Chronic | ICD-10-CM

## 2023-04-19 DIAGNOSIS — Z98.49 CATARACT EXTRACTION STATUS, UNSPECIFIED EYE: Chronic | ICD-10-CM

## 2023-04-19 DIAGNOSIS — Z00.8 ENCOUNTER FOR OTHER GENERAL EXAMINATION: ICD-10-CM

## 2023-04-19 DIAGNOSIS — Z98.890 OTHER SPECIFIED POSTPROCEDURAL STATES: Chronic | ICD-10-CM

## 2023-04-19 PROCEDURE — 74183 MRI ABD W/O CNTR FLWD CNTR: CPT | Mod: MH

## 2023-04-19 PROCEDURE — A9585: CPT

## 2023-04-19 PROCEDURE — 74183 MRI ABD W/O CNTR FLWD CNTR: CPT | Mod: 26,MH

## 2023-04-24 ENCOUNTER — APPOINTMENT (OUTPATIENT)
Dept: HEPATOLOGY | Facility: CLINIC | Age: 73
End: 2023-04-24
Payer: MEDICARE

## 2023-04-24 VITALS
HEART RATE: 62 BPM | HEIGHT: 68 IN | TEMPERATURE: 97.2 F | BODY MASS INDEX: 32.13 KG/M2 | OXYGEN SATURATION: 97 % | SYSTOLIC BLOOD PRESSURE: 107 MMHG | RESPIRATION RATE: 16 BRPM | DIASTOLIC BLOOD PRESSURE: 66 MMHG | WEIGHT: 212 LBS

## 2023-04-24 PROCEDURE — 99205 OFFICE O/P NEW HI 60 MIN: CPT

## 2023-04-25 ENCOUNTER — NON-APPOINTMENT (OUTPATIENT)
Age: 73
End: 2023-04-25

## 2023-04-25 LAB
AFP-TM SERPL-MCNC: 13 NG/ML
ALBUMIN SERPL ELPH-MCNC: 4.5 G/DL
ALP BLD-CCNC: 147 U/L
ALT SERPL-CCNC: 19 U/L
ANION GAP SERPL CALC-SCNC: 16 MMOL/L
AST SERPL-CCNC: 20 U/L
BILIRUB SERPL-MCNC: 0.4 MG/DL
BUN SERPL-MCNC: 23 MG/DL
CALCIUM SERPL-MCNC: 10.4 MG/DL
CANCER AG19-9 SERPL-ACNC: <2 U/ML
CHLORIDE SERPL-SCNC: 101 MMOL/L
CO2 SERPL-SCNC: 24 MMOL/L
CREAT SERPL-MCNC: 1 MG/DL
EGFR: 79 ML/MIN/1.73M2
ESTIMATED AVERAGE GLUCOSE: 171 MG/DL
GLUCOSE SERPL-MCNC: 239 MG/DL
HBA1C MFR BLD HPLC: 7.6 %
HBV DNA # SERPL NAA+PROBE: <10 IU/ML
HBV SURFACE AG SER QL: REACTIVE
HCT VFR BLD CALC: 51 %
HCV RNA SERPL NAA+PROBE-LOG IU: NOT DETECTED LOGIU/ML
HEPB DNA PCR INT: DETECTED
HEPB DNA PCR LOG: <1 LOGIU/ML
HEPC RNA INTERP: NOT DETECTED
HGB BLD-MCNC: 16 G/DL
INR PPP: 0.95 RATIO
MCHC RBC-ENTMCNC: 28.3 PG
MCHC RBC-ENTMCNC: 31.4 GM/DL
MCV RBC AUTO: 90.3 FL
PLATELET # BLD AUTO: 170 K/UL
POTASSIUM SERPL-SCNC: 5.1 MMOL/L
PROT SERPL-MCNC: 7.5 G/DL
PT BLD: 11 SEC
RBC # BLD: 5.65 M/UL
RBC # FLD: 14 %
SODIUM SERPL-SCNC: 142 MMOL/L
WBC # FLD AUTO: 13.58 K/UL

## 2023-04-26 ENCOUNTER — APPOINTMENT (OUTPATIENT)
Dept: OTOLARYNGOLOGY | Facility: CLINIC | Age: 73
End: 2023-04-26
Payer: MEDICARE

## 2023-04-26 VITALS — HEIGHT: 68 IN | BODY MASS INDEX: 32.13 KG/M2 | WEIGHT: 212 LBS | TEMPERATURE: 97.2 F

## 2023-04-26 DIAGNOSIS — H90.0 CONDUCTIVE HEARING LOSS, BILATERAL: ICD-10-CM

## 2023-04-26 DIAGNOSIS — H65.23 CHRONIC SEROUS OTITIS MEDIA, BILATERAL: ICD-10-CM

## 2023-04-26 PROCEDURE — 99213 OFFICE O/P EST LOW 20 MIN: CPT | Mod: 25

## 2023-04-26 PROCEDURE — 69433 CREATE EARDRUM OPENING: CPT | Mod: LT

## 2023-04-26 NOTE — PROCEDURE
[FreeTextEntry2] : rt amparo [FreeTextEntry3] : With the patient in an upright position the operating microscope is utilized to visualize the right tympanic membrane which is dull in appearance w extensive  tympanosclerosis.\par \par Topical phenyl is applied to the posterior/inferior quadrant.\par \par A small myringotomy incision is placed and a  larger amount of  serous fluid is drained.\par \par A standard vent tube is then placed without difficulty.\par \par The procedure was well tolerated.\par

## 2023-04-26 NOTE — PHYSICAL EXAM
[Midline] : trachea located in midline position [Normal] : no rashes [de-identified] : au tm dull as mid tm retraction pocket

## 2023-04-26 NOTE — ASSESSMENT
[FreeTextEntry1] : amparo  au mixed cond loss\par no improvement w med rx\par rt myr and vent tube placed\par extensive tympanosclerosis\par fu 2 wk fo as m and t

## 2023-04-27 ENCOUNTER — NON-APPOINTMENT (OUTPATIENT)
Age: 73
End: 2023-04-27

## 2023-04-27 ENCOUNTER — APPOINTMENT (OUTPATIENT)
Dept: OTOLARYNGOLOGY | Facility: CLINIC | Age: 73
End: 2023-04-27
Payer: MEDICARE

## 2023-04-27 VITALS — TEMPERATURE: 96.7 F | HEIGHT: 68 IN | BODY MASS INDEX: 32.13 KG/M2 | WEIGHT: 212 LBS

## 2023-04-27 DIAGNOSIS — S00.411S ABRASION OF RIGHT EAR, SEQUELA: ICD-10-CM

## 2023-04-27 PROCEDURE — 99024 POSTOP FOLLOW-UP VISIT: CPT

## 2023-04-27 NOTE — ASSESSMENT
[FreeTextEntry1] : abrasio ad canal\par tube goodposition\par coagulation issue\par able to hold palvix for 3 d\par fu 1 week

## 2023-04-27 NOTE — PHYSICAL EXAM
[de-identified] : clot ad canal [Midline] : trachea located in midline position [Normal] : no rashes

## 2023-04-27 NOTE — REVIEW OF SYSTEMS
[Negative] : Heme/Lymph [Patient Intake Form Reviewed] : Patient intake form was reviewed [de-identified] : bleeding right ear

## 2023-04-27 NOTE — PROCEDURE
[FreeTextEntry6] : Indication: Cannot adequately examine ear canal/tympanic membrane with otoscope.\par Findings\par ad clot cleared eac vent tube good postion\par no active bleeding

## 2023-04-27 NOTE — HISTORY OF PRESENT ILLNESS
[de-identified] : co bleeding rt ear since vent tube placement yesterday\par plavix and asa\par dc plavixlastpm

## 2023-05-04 ENCOUNTER — APPOINTMENT (OUTPATIENT)
Dept: CT IMAGING | Facility: CLINIC | Age: 73
End: 2023-05-04
Payer: MEDICARE

## 2023-05-04 ENCOUNTER — APPOINTMENT (OUTPATIENT)
Dept: ULTRASOUND IMAGING | Facility: CLINIC | Age: 73
End: 2023-05-04
Payer: MEDICARE

## 2023-05-04 ENCOUNTER — OUTPATIENT (OUTPATIENT)
Dept: OUTPATIENT SERVICES | Facility: HOSPITAL | Age: 73
LOS: 1 days | End: 2023-05-04
Payer: MEDICARE

## 2023-05-04 DIAGNOSIS — Z95.1 PRESENCE OF AORTOCORONARY BYPASS GRAFT: Chronic | ICD-10-CM

## 2023-05-04 DIAGNOSIS — C22.9 MALIGNANT NEOPLASM OF LIVER, NOT SPECIFIED AS PRIMARY OR SECONDARY: ICD-10-CM

## 2023-05-04 DIAGNOSIS — Z98.890 OTHER SPECIFIED POSTPROCEDURAL STATES: Chronic | ICD-10-CM

## 2023-05-04 DIAGNOSIS — Z98.49 CATARACT EXTRACTION STATUS, UNSPECIFIED EYE: Chronic | ICD-10-CM

## 2023-05-04 PROCEDURE — 76705 ECHO EXAM OF ABDOMEN: CPT | Mod: 26

## 2023-05-04 PROCEDURE — 71250 CT THORAX DX C-: CPT | Mod: 26,MH

## 2023-05-04 PROCEDURE — 71250 CT THORAX DX C-: CPT

## 2023-05-04 PROCEDURE — 76705 ECHO EXAM OF ABDOMEN: CPT

## 2023-05-10 ENCOUNTER — APPOINTMENT (OUTPATIENT)
Dept: HEPATOLOGY | Facility: CLINIC | Age: 73
End: 2023-05-10
Payer: MEDICARE

## 2023-05-10 ENCOUNTER — APPOINTMENT (OUTPATIENT)
Dept: OTOLARYNGOLOGY | Facility: CLINIC | Age: 73
End: 2023-05-10

## 2023-05-10 VITALS
TEMPERATURE: 98.1 F | SYSTOLIC BLOOD PRESSURE: 133 MMHG | DIASTOLIC BLOOD PRESSURE: 79 MMHG | HEIGHT: 68 IN | RESPIRATION RATE: 16 BRPM | WEIGHT: 215 LBS | HEART RATE: 65 BPM | BODY MASS INDEX: 32.58 KG/M2 | OXYGEN SATURATION: 98 %

## 2023-05-10 PROCEDURE — 99214 OFFICE O/P EST MOD 30 MIN: CPT

## 2023-05-10 RX ORDER — METFORMIN ER 500 MG 500 MG/1
500 TABLET ORAL
Qty: 180 | Refills: 1 | Status: DISCONTINUED | COMMUNITY
Start: 2020-02-10 | End: 2023-05-10

## 2023-05-10 RX ORDER — PANTOPRAZOLE 40 MG/1
40 TABLET, DELAYED RELEASE ORAL DAILY
Qty: 90 | Refills: 0 | Status: DISCONTINUED | COMMUNITY
Start: 2020-12-09 | End: 2023-05-10

## 2023-05-10 RX ORDER — METFORMIN ER 500 MG 500 MG/1
500 TABLET ORAL
Qty: 360 | Refills: 1 | Status: DISCONTINUED | COMMUNITY
Start: 2021-11-22 | End: 2023-05-10

## 2023-05-10 RX ORDER — SIMVASTATIN 40 MG/1
40 TABLET, FILM COATED ORAL
Qty: 90 | Refills: 1 | Status: DISCONTINUED | COMMUNITY
Start: 2019-12-09 | End: 2023-05-10

## 2023-05-10 RX ORDER — PREDNISONE 10 MG/1
10 TABLET ORAL
Qty: 20 | Refills: 2 | Status: DISCONTINUED | COMMUNITY
Start: 2023-04-11 | End: 2023-05-10

## 2023-05-10 RX ORDER — ERGOCALCIFEROL 1.25 MG/1
1.25 MG CAPSULE ORAL
Qty: 7 | Refills: 0 | Status: DISCONTINUED | COMMUNITY
Start: 2022-06-09 | End: 2023-05-10

## 2023-05-11 NOTE — ASSESSMENT
[FreeTextEntry1] : 74 y/o m ref by Dr. Sj Squires for liver lesion concerning for HCC> liver disease secondary to HBV and fatty liver and an underlying  to HCC, DM.  Labs and imaging reviewed. Following issues were d/w pt in detail\par pt is non cirrhotic by imaging\par \par Patient educated on the diagnosis and natural history of HBV, risk factors and role of monitoring labs,  transmission, the fact that the patient's parents, siblings, sexual partner and children (as appropriate) need  to be screened for the virus.  The overall fibrogenic and oncogenic potential of HBV discussed.  Starting and endpoint of treatment criteria discussed.  The importance of vaccination in family members who don't have HBV and have never been exposed discussed. \par continue HBV med\par \par Patient educated on the risk factors, diagnosis, natural history and treatment of Liver cancer (HCC).  We will review patient's case in tumor board and then will determine the best treatment option (loco-regional therapies +/-medical therapy; liver resection or OLT is considered in some patients). Patient will be seeing an Oncologist and Liver surgeon if necessary.\par \par update labs\par discuss in TB on 4/27/23\par f/u in 2 weeks\par f/u with Dr Squires\par \par \par \par

## 2023-05-11 NOTE — PHYSICAL EXAM
[Alert] : alert [Clear to Auscultation] : lungs were clear to auscultation bilaterally [Breathing Comfortably on room air] : breathing comfortably on room air [Normal Rate] : normal rate [Regular Rhythm] : regular rhythm [Soft] : soft [Normal Bowel Sounds] : normal bowel sounds [No Edema] : no edema [Scleral Icterus] : no scleral icterus [Spider Angioma] : no spider angioma [Jaundice] : no jaundice [Asterixis] : no asterixis [Hepatic Encephalopathy] : no hepatic encephalopathy

## 2023-05-11 NOTE — PHYSICAL EXAM
[General Appearance - Alert] : alert [] : the neck was supple [Apical Impulse] : the apical impulse was normal [Heart Rate And Rhythm] : heart rate was normal and rhythm regular [Heart Sounds] : normal S1 and S2 [Bowel Sounds] : normal bowel sounds [Abdomen Soft] : soft [Abdomen Tenderness] : non-tender [Sclera] : the sclera and conjunctiva were normal [Oriented To Time, Place, And Person] : oriented to person, place, and time [FreeTextEntry1] : obese

## 2023-05-11 NOTE — ASSESSMENT
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

## 2023-05-11 NOTE — HISTORY OF PRESENT ILLNESS
[de-identified] : 72 y/o m ref by Dr. Sj Squires for liver lesion concerning for HCC. HBV dx 20 + years, and fatty liver;  was given 'antiviral' for many years and then 2 years ago was told by another MD to stop meds; recently was seen by Dr. Squires and was told to start entecavir. \par Feels well overall. \par no sx of liver decompensation\par \par Recent MRI: \par FINDINGS:\par LOWER CHEST: Median sternotomy.\par \par LIVER: Normal morphology. No significant steatosis.\par \par Lesion #: 1\par Location: Segment 7\par Size: 2.2 x 1.6 cm (6:34).\par AP Hyperenhancement: YES\par PV/Equilibrium Washout: YES\par Capsule Appearance: YES\par Threshold Growth (>50% size increase in <= 6 months): N/A.\par Ancillary features: Restricted diffusion, mild T2 signal hyperintensity.\par LI-RADS v 2018 Category: LR-5 Definitely HCC.\par OPTN Category (if LR-5): 5B\par \par PMH- Cad, DM x 20, high chol\par PSH - CABG x 4\par MEDS - asa, clopidogrel, metoprolol, rosuvastatin, oxybutynin, metformin, entecavir, folic acid, novalog, gasaglar, \par NkDA\par FH - no liver dz\par SH- born in [place], 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

## 2023-05-11 NOTE — PLAN
[FreeTextEntry1] : \par #Advanced Liver Fibrosis 2/2 fatty liver + Chronic HBV\par - 4/18/23 MRI review noting early signs of cirrhosis\par - Patient is aware that they have several components of the metabolic syndrome including weight gain during adulthood. The benefit of a low glycemic diet and exercise were discussed. The patient needs improved treatment of diabetes, HTN, and HLD as appropriate. Long term sequelae of fatty liver disease including progression to decompensated cirrhosis explained to patient. The utility of nutrition consult explained. The role of liver biopsy also discussed. \par - We had a length discussion about Hepatitis B: ways of transmission, how to prevent it, natural history, potential complications including hepatocellular carcinoma and liver cirrhosis, available treatments, importance of medication compliance, goals of treatment, and importance of biannual HCC screening. We discussed the need to use barrier protection until sexual partner completes HBV immunization and verifies presence of immunity.\par - C/w Entecavir 0.5 mg/day \par - 4/24/23 HBV PCR Detected but not quantifiable & HBsAg+; trend HBV PCR\par - Check HDV on next set of labs\par - Advised to avoid nephrotoxic agents\par - Reviewed s/s of liver decompensation and advised to report immediately \par \par #Bilobar HCC\par - Lesion #1: Segment 7, 2.2 cm, LR 5 + Lesion #2: Segment 2, 2 cm, LR 5\par - [facility] Tumor Board Consensus recommendation to IR for LDT (Segment 7 lesion ablation or Y90 & Segment 2 lesion Y90) > IR Referral for consult with Dr. Larson sent\par - 5/4/23 CT Chest done and report pending \par - Order NM Bone scan for metastatic w/u\par \par RTC after IR treatments. \par Patient seen and plan discussed with Dr. Vieyra. \par \par Annie Barros, MSN, AGACNP-BC\par Transplant Hepatology Nurse Practitioner\par [facility]\par [address] [place] [id number]\par T: [phone] | F: [phone]

## 2023-05-11 NOTE — HISTORY OF PRESENT ILLNESS
[TextBox_42] : Transplant Hepatologist: Alda Vieyra DO\[facility] Transplant Hepatology NP: Annie Barros, [facility]-BC\[facility] \[facility] Natty Chaudhari is a 74 y/o male with a PMH of CAD, T2DM, HLD, Obesity, NAFLD, Chronic HBV and HCC, here for follow-up. \[facility] Patient established care with us after referral from Dr. Squires for evaluation of liver lesion c/f HCC. Patient with long standing hx of HBV and fatty liver (~ 20 yrs) -- HBV tx with 'antiviral' (cannot recall which) for many years and then about 2 yrs ago was advised to d/c by MD. Recently seen by Dr. Squires and started on Entecavir 0.5 mg/day. No episodes of liver decompensation. No prior liver bx. No known family hx of liver disease. Born in [place] and [place] many years. Previously worked in construction and now retired. Lives at home with family. No current or hx of ETOH use. No IVDU. No blood transfusions. No OTC med/supps/herbals. \[facility] 4/19/23 MRI Abd reviewed on 4/27/23 Hepatobiliary Tumor Board: liver with borderline morphology, no classic posterior notching or widened fissures however nodularity on posterior right lobe c/w early signs of cirrhosis. Lesion #1: Segment 7, 2.2 cm, LR 5 + Lesion #2: Segment 2, 2 cm, LR 5. Consensus recommendation to IR for LDT (Segment 7 lesion ablation/Y90 & Segment 2 lesion Y90). 5/4/23 CT Chest done and report pending. IR Referral for consult with Dr. Neo summers. [facility] In the interim since last visit, there have been no interim illnesses or hospitalizations. No episodes of liver decompensation. Has been tolerating Entecavir and denies s/e -- reports compliance and no missed doses. Patient's allergies, medications, past medical, surgical, family, and social histories were reviewed and updated as appropriate. Seen in clinic today, reports that he feels well and is w/o complaints. Has been working on improving diet and increasing physical activity, however, weight relatively unchanged since last visit (current weight 215 lbs). Denies any recent fevers, chills, cough, lightheadedness, AMS, abdominal pain, n/v, diarrhea, hematochezia, hematemesis, and melena. Denies alcohol, tobacco, or recreational drug use.

## 2023-05-11 NOTE — REVIEW OF SYSTEMS
[Fever] : no fever [Chills] : no chills [Fatigue] : no fatigue [Recent Weight Gain (___ Lbs)] : no recent weight gain [Recent Weight Loss (___ Lbs)] : no recent weight loss [Chest Pain] : no chest pain [Palpitations] : no palpitations [SOB] : no shortness of breath [Cough] : no cough [Abdominal Pain] : no abdominal pain [Nausea] : no nausea [Constipation] : no constipation [Diarrhea] : diarrhea [Vomiting] : no vomiting [Dysuria] : no dysuria [Hematuria] : no hematuria [Itching] : no itching [Headache] : no headache [Dizziness] : no dizziness

## 2023-05-12 ENCOUNTER — APPOINTMENT (OUTPATIENT)
Dept: NUCLEAR MEDICINE | Facility: IMAGING CENTER | Age: 73
End: 2023-05-12
Payer: MEDICARE

## 2023-05-12 ENCOUNTER — NON-APPOINTMENT (OUTPATIENT)
Age: 73
End: 2023-05-12

## 2023-05-12 ENCOUNTER — OUTPATIENT (OUTPATIENT)
Dept: OUTPATIENT SERVICES | Facility: HOSPITAL | Age: 73
LOS: 1 days | End: 2023-05-12
Payer: MEDICARE

## 2023-05-12 DIAGNOSIS — C22.9 MALIGNANT NEOPLASM OF LIVER, NOT SPECIFIED AS PRIMARY OR SECONDARY: ICD-10-CM

## 2023-05-12 DIAGNOSIS — Z98.890 OTHER SPECIFIED POSTPROCEDURAL STATES: Chronic | ICD-10-CM

## 2023-05-12 DIAGNOSIS — Z98.49 CATARACT EXTRACTION STATUS, UNSPECIFIED EYE: Chronic | ICD-10-CM

## 2023-05-12 DIAGNOSIS — Z95.1 PRESENCE OF AORTOCORONARY BYPASS GRAFT: Chronic | ICD-10-CM

## 2023-05-12 PROCEDURE — 78306 BONE IMAGING WHOLE BODY: CPT | Mod: 26,MH

## 2023-05-12 PROCEDURE — A9561: CPT

## 2023-05-12 PROCEDURE — 78306 BONE IMAGING WHOLE BODY: CPT

## 2023-05-15 ENCOUNTER — RX RENEWAL (OUTPATIENT)
Age: 73
End: 2023-05-15

## 2023-06-09 ENCOUNTER — APPOINTMENT (OUTPATIENT)
Dept: INTERVENTIONAL RADIOLOGY/VASCULAR | Facility: CLINIC | Age: 73
End: 2023-06-09
Payer: MEDICARE

## 2023-06-09 VITALS
SYSTOLIC BLOOD PRESSURE: 139 MMHG | HEART RATE: 77 BPM | DIASTOLIC BLOOD PRESSURE: 76 MMHG | TEMPERATURE: 98 F | OXYGEN SATURATION: 98 % | RESPIRATION RATE: 16 BRPM

## 2023-06-09 PROCEDURE — 99203 OFFICE O/P NEW LOW 30 MIN: CPT

## 2023-06-09 RX ORDER — OXYBUTYNIN CHLORIDE 2.5 MG/1
TABLET ORAL DAILY
Refills: 0 | Status: ACTIVE | COMMUNITY

## 2023-06-09 RX ORDER — ROSUVASTATIN CALCIUM 20 MG/1
20 TABLET, FILM COATED ORAL DAILY
Refills: 0 | Status: ACTIVE | COMMUNITY

## 2023-06-09 RX ORDER — INSULIN ASPART 100 [IU]/ML
100 INJECTION, SOLUTION INTRAVENOUS; SUBCUTANEOUS
Refills: 0 | Status: ACTIVE | COMMUNITY
Start: 2021-09-17

## 2023-06-09 RX ORDER — PANTOPRAZOLE SODIUM 40 MG/1
40 TABLET, DELAYED RELEASE ORAL DAILY
Refills: 0 | Status: ACTIVE | COMMUNITY
Start: 2023-06-09

## 2023-06-09 NOTE — ASSESSMENT
[SIRT Procedure & Planning] : SIRT procedure and planning discussed at length [FreeTextEntry1] :  This is a 72 y/o male with pmhx of CAD s/p 4v CABG, T2DM, HTN, HBV, and new diagnosis of HCC who presents today for consultation for liver directed therapy.  \par \par 1.  HCC\par - hx of HBV, currently on Entacavir\par - 4/19/23 MRI segment 7 with a 2.2 cm LR-5 lesion and there is a 2 cm LR-5 lesion in segment 2\par - His case was discuseed at 4/27/23 [facility], consensus was to proceed with laparoscopic ablation vs segmental Y90\par - Only the segment 7 lesion was visualized on u/s\par - Recommending treatment with radioembolization to segment 2 and segment 7\par - I reviewed the procedure, including the mapping angiogram, risks (infection, bleeding, elevated LFTs, ERICK), benefits, alternatives, and expected post procedure course.\par - I reviewed post procedure radiation safety precautions\par - chest CT 5/4/23\par - NM bone scan 5/12/23\par - currently on PPI\par \par 2.  Elevated AFP\par - recent level 13\par - will repeat on dos and continue to trend\par \par 3.  CAD\par - s/p 4 stents in 2003 and 4v CABG in 12/2019\par - currently on Plavix and aspirin\par - would like to hold Plavix x 5 days, may continue aspirin\par - will reach out to cardiology to discuss\par \par 4.  T2DM\par - hold meds per PST\par - fs on arrival\par - recent HgA1c 7.6\par \par I have provided the patient the opportunity to ask questions and have answered them to their satisfaction.  They are encouraged to contact our office with any further questions, concerns, or issues.\par \par

## 2023-06-09 NOTE — HISTORY OF PRESENT ILLNESS
[FreeTextEntry1] :  This is a 74 y/o male with pmhx of CAD s/p 4v CABG, T2DM, HTN, HBV, and new diagnosis of HCC who presents today for consultation for liver directed therapy.  \par \par Pt had been following with Dr. Sj Squires for liver lesion concerning for HCC. HBV dx 20 + years, and fatty liver; was given 'antiviral' for many years and then 2 years ago was told by another MD to stop meds; recently was seen by Dr. Squires and was told to start entecavir. He was evaluated by Dr. Vieyra recently and his case was discussed at 4/27/23 [facility].  The consensus was to proceed with laparoscopic ablation vs segmental Y90.  He presents today to discuss liver directed therapy. 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 [place]\par Endo: unsure name at time of consult\par Cards: Sharif Abreu\par Hep: Jarrell\par

## 2023-06-09 NOTE — DATA REVIEWED
[FreeTextEntry1] : \par \par \par EXAM: [id number] - MR ABDOMEN WAW IC - ORDERED BY: CHI CROCKER\par \par \par *** ADDENDUM # 1 ***\par \par Lesion #: 2\par Location: Segment 2\par Size: 2.2 cm. (13:28)\par AP Hyperenhancement: YES\par PV/Equilibrium Washout: YES\par Capsule Appearance: YES\par Threshold Growth (>50% size increase in <= 6 months): N/A.\par Ancillary features: Mosaic pattern of enhancement. Restricted diffusion.\par LI-RADS v 2018 Category: LR-5 Definitely HCC.\par OPTN Category (if LR-5): 5B\par \par --- End of Report ---\par \par *** END OF ADDENDUM # 1 ***\par \par PROCEDURE DATE: 04/19/2023\par \par \par \par INTERPRETATION: CLINICAL INFORMATION: Hepatitis B. Elevated alpha-fetoprotein. Request to evaluate for hepatocellular carcinoma.\par \par COMPARISON: Abdominal ultrasound 8/9/2022.\par \par CONTRAST/COMPLICATIONS:\par IV Contrast: Gadavist 9.5 cc administered 0.5 cc discarded\par Oral Contrast: NONE\par Complications: None reported at time of study completion\par \par PROCEDURE:\par MRI of the abdomen was performed.\par MRCP was performed.\par \par FINDINGS:\par LOWER CHEST: Median sternotomy.\par \par LIVER: Normal morphology. No significant steatosis.\par \par Lesion #: 1\par Location: Segment 7\par Size: 2.2 x 1.6 cm (6:34).\par AP Hyperenhancement: YES\par PV/Equilibrium Washout: YES\par Capsule Appearance: YES\par Threshold Growth (>50% size increase in <= 6 months): N/A.\par Ancillary features: Restricted diffusion, mild T2 signal hyperintensity.\par LI-RADS v 2018 Category: LR-5 Definitely HCC.\par OPTN Category (if LR-5): 5B\par \par BILE DUCTS: Normal caliber.\par GALLBLADDER: A 0.4 cm gallbladder polyp seen at ultrasound 8/9/2022 is not well visualized on this exam.\par SPLEEN: Within normal limits.\par PANCREAS: Within normal limits.\par ADRENALS: Within normal limits.\par KIDNEYS/URETERS: Within normal limits.\par \par VISUALIZED PORTIONS:\par BOWEL: Within normal limits.\par PERITONEUM: No ascites.\par VESSELS: Hepatic, portal veins and SMV are patent. Atheromatous changes in the aorta. Accessory right hepatic artery arising from the SMA. Accessory left hepatic artery arising from the left gastric.\par RETROPERITONEUM/LYMPH NODES: No lymphadenopathy.\par ABDOMINAL WALL: Within normal limits.\par BONES: Degenerative changes.\par \par IMPRESSION:\par \par A 2.2 cm LIRADS- 5 observation in hepatic segment 7 ( LIRADS-5, definitely HCC).\par \par \par \par --- End of Report ---\par \par \par ***Please see the addendum at the top of this report. It may contain additional important information or changes.****\par \par \par  GEORGE MORAN MD; Resident Radiologist\par This document has been electronically signed.\par ARNOLD GONSALEZ MD; Attending Radiologist\par This document has been electronically signed. Apr 21 2023 8:23AM\par 1st Addendum: ARNOLD GONSALEZ MD; Attending Radiologist\par The first addendum was electronically signed on: Apr 27 2023 8:46PM.\par \par \par \par \par \par EXAM: [id number] - NM BONE IMG WHOLE BODY - ORDERED BY: EMERSON LU\par \par \par PROCEDURE DATE: 05/12/2023\par \par \par \par INTERPRETATION: CLINICAL INFORMATION: 73 year-old male with hepatocellular carcinoma, referred for staging,\par \par RADIOPHARMACEUTICAL: 20.8 mCi Tc-99m HDP, I.V.\par \par TECHNIQUE: Whole-body planar images were obtained in the anterior and posterior projections approximately 2-3 hours after tracer injection. Additional static views of the chest in the anterior, posterior and lateral projections were also obtained.\par \par COMPARISON: No previous bone scan for comparison. CT of the chest 5/4/2023, MRI of the abdomen 4/19/2023\par \par FINDINGS: There is mildly increased uptake in the sternum likely post sternotomy changes. Mildly heterogeneous, increased tracer activity in the thoracic spine notably in the lower thoracic spine at T11-T12 vertebrae which may correspond to degenerative changes/compression deformity on CT. There are degenerative changes in the major joints. There is physiologic tracer distribution in the remainder of the visualized skeleton.\par \par Both kidneys are visualized.\par \par IMPRESSION: Bone scan demonstrates:\par \par No definite scan evidence of osseous metastasis.\par \par Degenerative disease in the spine and major joints.\par \par --- End of Report ---\par \par \par \par EXAM: [id number] - CT CHEST - ORDERED BY: EMERSON LU\par \par \par PROCEDURE DATE: 05/04/2023\par \par \par \par INTERPRETATION: CLINICAL INFORMATION: History of HCC. Evaluate for lung metastases.\par \par COMPARISON: Chest x-ray 1/24/2020\par \par CONTRAST/COMPLICATIONS:\par IV Contrast: None\par Oral Contrast: None\par Complications: None\par \par PROCEDURE:\par CT of the Chest was performed.\par Sagittal and coronal reformats were performed.\par \par FINDINGS:\par \par LUNGS, AIRWAYS, AND PLEURA: Patent central airways. No pleural effusions. Left apical punctate calcified granuloma. Pulmonary nodules measuring up to 5 mm, including left lower lobe series 2, image 74, and subpleural left upper lobe images 30 and 38. Small cluster of nodules in the right lower lobe image 1 1.\par \par MEDIASTINUM AND JELENA: No lymphadenopathy. Calcified mediastinal and hilar nodes, likely sequela of old granulomatous disease.\par \par HEART/VESSELS: Status post CABG. Heart size is normal. No pericardial effusion. Retained epicardial leads. Native coronary artery stenting. The great vessels are normal in size.\par \par CHEST WALL AND LOWER NECK: Subcentimeter right thyroid nodule.\par \par VISUALIZED UPPER ABDOMEN: Hypodense hepatic lesion better characterized on recent MR abdomen.\par \par BONES: No aggressive osseous lesion. Unchanged T12 compression deformity since lateral chest radiograph 1/24/2020. Sternotomy wires are midline and intact.\par \par IMPRESSION:\par Few pulmonary nodules measuring up to 5 mm, amenable to follow-up chest CT in 3 months or as per clinical protocol.\par \par --- End of Report ---\par \par \par EXAM: [id number] - US LIVER - ORDERED BY: MITCH HOU\par \par \par PROCEDURE DATE: 05/04/2023\par \par \par \par INTERPRETATION: CLINICAL INFORMATION: Patient with 2 live RADS 5 lesions identified on recent MRI. Identify on ultrasound.\par \par COMPARISON: MRI 4/19/2023.\par \par TECHNIQUE: Sonography of the right upper quadrant.\par \par FINDINGS:\par \par Liver: Coarsened echotexture. A hypoechoic lesion in right hepatic lobe measuring 2.2 cm.\par Bile ducts: Normal caliber. Common bile duct measures 3 mm.\par Gallbladder: Within normal limits.\par Pancreas: Not well visualized.\par Right kidney: 11.0 cm. No hydronephrosis.\par Ascites: None.\par IVC: Visualized portions are within normal limits.\par \par IMPRESSION:\par \par Findings are suggestive of underlying chronic liver disease. Only the segment 7 lesion is visualized on this exam.\par \par --- End of Report ---

## 2023-06-09 NOTE — ADDENDUM
[FreeTextEntry1] : I, Dr. Larson, personally performed the evaluation and management (E/M) services for this established patient who presents today with (a) new problem(s)/exacerbation of (an) existing condition(s).  That E/M includes conducting the examination, assessing all new/exacerbated conditions, and establishing a new plan of care.  Today, my ACP, [Vickie Perez NP], was here to observe my evaluation and management services for this new problem/exacerbated condition to be followed going forward.\par \par

## 2023-06-09 NOTE — PHYSICAL EXAM
[Alert] : alert [No Acute Distress] : no acute distress [Well Nourished] : well nourished [Well Developed] : well developed [Healthy Appearance] : healthy appearance [Normal Voice/Communication] : normal voice communication [Normal Sclera/Conjunctiva] : normal sclera/conjunctiva [No Respiratory Distress] : no respiratory distress [Normal Rate and Effort] : normal respiratory rhythm and effort [No Accessory Muscle Use] : no accessory muscle use [Normal Gait] : normal gait [Oriented x3] : oriented to person, place, and time [Normal Insight/Judgement] : insight and judgment were intact [Normal Affect] : the affect was normal [Normal Mood] : the mood was normal [Recent Memory Normal] : recent memory was not impaired [Remote Memory Normal] : remote memory was not impaired [Restricted in physically strenuous activity but ambulatory and able to carry out work of a light or sedentary nature] : Restricted in physically strenuous activity but ambulatory and able to carry out work of a light or sedentary nature, e.g., light house work, office work

## 2023-06-27 ENCOUNTER — OUTPATIENT (OUTPATIENT)
Dept: OUTPATIENT SERVICES | Facility: HOSPITAL | Age: 73
LOS: 1 days | Discharge: HOME | End: 2023-06-27
Payer: MEDICARE

## 2023-06-27 VITALS
TEMPERATURE: 97 F | WEIGHT: 214.95 LBS | DIASTOLIC BLOOD PRESSURE: 76 MMHG | HEIGHT: 68 IN | HEART RATE: 88 BPM | OXYGEN SATURATION: 96 % | SYSTOLIC BLOOD PRESSURE: 124 MMHG | RESPIRATION RATE: 26 BRPM

## 2023-06-27 DIAGNOSIS — Z98.890 OTHER SPECIFIED POSTPROCEDURAL STATES: Chronic | ICD-10-CM

## 2023-06-27 DIAGNOSIS — Z86.010 PERSONAL HISTORY OF COLONIC POLYPS: ICD-10-CM

## 2023-06-27 DIAGNOSIS — K74.69 OTHER CIRRHOSIS OF LIVER: ICD-10-CM

## 2023-06-27 DIAGNOSIS — Z98.49 CATARACT EXTRACTION STATUS, UNSPECIFIED EYE: Chronic | ICD-10-CM

## 2023-06-27 DIAGNOSIS — Z95.1 PRESENCE OF AORTOCORONARY BYPASS GRAFT: Chronic | ICD-10-CM

## 2023-06-27 LAB
GLUCOSE BLDC GLUCOMTR-MCNC: 155 MG/DL — HIGH (ref 70–99)
GLUCOSE BLDC GLUCOMTR-MCNC: 163 MG/DL — HIGH (ref 70–99)

## 2023-06-27 PROCEDURE — 82962 GLUCOSE BLOOD TEST: CPT

## 2023-06-27 PROCEDURE — 93005 ELECTROCARDIOGRAM TRACING: CPT | Mod: XU

## 2023-06-27 PROCEDURE — 88305 TISSUE EXAM BY PATHOLOGIST: CPT

## 2023-06-27 PROCEDURE — 88305 TISSUE EXAM BY PATHOLOGIST: CPT | Mod: 26

## 2023-06-27 PROCEDURE — 88312 SPECIAL STAINS GROUP 1: CPT | Mod: 26

## 2023-06-27 PROCEDURE — 88312 SPECIAL STAINS GROUP 1: CPT

## 2023-06-27 PROCEDURE — 93010 ELECTROCARDIOGRAM REPORT: CPT

## 2023-06-27 NOTE — ASU PATIENT PROFILE, ADULT - FALL HARM RISK - UNIVERSAL INTERVENTIONS
Bed in lowest position, wheels locked, appropriate side rails in place/Call bell, personal items and telephone in reach/Instruct patient to call for assistance before getting out of bed or chair/Non-slip footwear when patient is out of bed/[place] to call system/Physically safe environment - no spills, clutter or unnecessary equipment/Purposeful Proactive Rounding/Room/bathroom lighting operational, light cord in reach

## 2023-06-27 NOTE — ASU PATIENT PROFILE, ADULT - NSICDXPASTMEDICALHX_GEN_ALL_CORE_FT
PAST MEDICAL HISTORY:  Arteriosclerosis     CAD (coronary artery disease)     Diabetes 1.5, managed as type 2     DJD (degenerative joint disease)     GERD (gastroesophageal reflux disease)     Heartburn     Hepatitis B     HTN (hypertension)     Hyperlipidemia     Liver cancer     Osteoporosis

## 2023-06-27 NOTE — ASU PREOP CHECKLIST - AS TEMP SITE
Pt tolerating mtx well. On 7.5 mg will check labs march 26th.   Discussed case with dr cool and will check IgE level.   Trying to get dupixent approved.    temporal

## 2023-06-28 PROBLEM — C22.9 MALIGNANT NEOPLASM OF LIVER, NOT SPECIFIED AS PRIMARY OR SECONDARY: Chronic | Status: ACTIVE | Noted: 2023-06-27

## 2023-06-28 LAB — SURGICAL PATHOLOGY STUDY: SIGNIFICANT CHANGE UP

## 2023-06-30 DIAGNOSIS — K21.9 GASTRO-ESOPHAGEAL REFLUX DISEASE WITHOUT ESOPHAGITIS: ICD-10-CM

## 2023-06-30 DIAGNOSIS — K74.60 UNSPECIFIED CIRRHOSIS OF LIVER: ICD-10-CM

## 2023-06-30 DIAGNOSIS — E78.5 HYPERLIPIDEMIA, UNSPECIFIED: ICD-10-CM

## 2023-06-30 DIAGNOSIS — I10 ESSENTIAL (PRIMARY) HYPERTENSION: ICD-10-CM

## 2023-06-30 DIAGNOSIS — E11.9 TYPE 2 DIABETES MELLITUS WITHOUT COMPLICATIONS: ICD-10-CM

## 2023-06-30 DIAGNOSIS — D12.3 BENIGN NEOPLASM OF TRANSVERSE COLON: ICD-10-CM

## 2023-06-30 DIAGNOSIS — Z12.11 ENCOUNTER FOR SCREENING FOR MALIGNANT NEOPLASM OF COLON: ICD-10-CM

## 2023-06-30 DIAGNOSIS — Z95.1 PRESENCE OF AORTOCORONARY BYPASS GRAFT: ICD-10-CM

## 2023-06-30 DIAGNOSIS — Z79.82 LONG TERM (CURRENT) USE OF ASPIRIN: ICD-10-CM

## 2023-06-30 DIAGNOSIS — I85.10 SECONDARY ESOPHAGEAL VARICES WITHOUT BLEEDING: ICD-10-CM

## 2023-06-30 DIAGNOSIS — K29.50 UNSPECIFIED CHRONIC GASTRITIS WITHOUT BLEEDING: ICD-10-CM

## 2023-06-30 DIAGNOSIS — I25.10 ATHEROSCLEROTIC HEART DISEASE OF NATIVE CORONARY ARTERY WITHOUT ANGINA PECTORIS: ICD-10-CM

## 2023-07-06 ENCOUNTER — OUTPATIENT (OUTPATIENT)
Dept: OUTPATIENT SERVICES | Facility: HOSPITAL | Age: 73
LOS: 1 days | End: 2023-07-06
Payer: MEDICARE

## 2023-07-06 VITALS
DIASTOLIC BLOOD PRESSURE: 70 MMHG | OXYGEN SATURATION: 99 % | WEIGHT: 216.05 LBS | HEIGHT: 69 IN | HEART RATE: 62 BPM | RESPIRATION RATE: 16 BRPM | SYSTOLIC BLOOD PRESSURE: 110 MMHG | TEMPERATURE: 98 F

## 2023-07-06 DIAGNOSIS — C22.0 LIVER CELL CARCINOMA: ICD-10-CM

## 2023-07-06 DIAGNOSIS — I25.10 ATHEROSCLEROTIC HEART DISEASE OF NATIVE CORONARY ARTERY WITHOUT ANGINA PECTORIS: ICD-10-CM

## 2023-07-06 DIAGNOSIS — Z98.49 CATARACT EXTRACTION STATUS, UNSPECIFIED EYE: Chronic | ICD-10-CM

## 2023-07-06 DIAGNOSIS — Z95.1 PRESENCE OF AORTOCORONARY BYPASS GRAFT: Chronic | ICD-10-CM

## 2023-07-06 DIAGNOSIS — E11.9 TYPE 2 DIABETES MELLITUS WITHOUT COMPLICATIONS: ICD-10-CM

## 2023-07-06 DIAGNOSIS — Z01.818 ENCOUNTER FOR OTHER PREPROCEDURAL EXAMINATION: ICD-10-CM

## 2023-07-06 DIAGNOSIS — Z98.890 OTHER SPECIFIED POSTPROCEDURAL STATES: Chronic | ICD-10-CM

## 2023-07-06 LAB
A1C WITH ESTIMATED AVERAGE GLUCOSE RESULT: 7.2 % — HIGH (ref 4–5.6)
ALBUMIN SERPL ELPH-MCNC: 4.2 G/DL — SIGNIFICANT CHANGE UP (ref 3.3–5)
ALP SERPL-CCNC: 99 U/L — SIGNIFICANT CHANGE UP (ref 40–120)
ALT FLD-CCNC: 20 U/L — SIGNIFICANT CHANGE UP (ref 10–45)
ANION GAP SERPL CALC-SCNC: 12 MMOL/L — SIGNIFICANT CHANGE UP (ref 5–17)
APTT BLD: 29.1 SEC — SIGNIFICANT CHANGE UP (ref 27.5–35.5)
AST SERPL-CCNC: 19 U/L — SIGNIFICANT CHANGE UP (ref 10–40)
BILIRUB SERPL-MCNC: 0.5 MG/DL — SIGNIFICANT CHANGE UP (ref 0.2–1.2)
BLD GP AB SCN SERPL QL: NEGATIVE — SIGNIFICANT CHANGE UP
BUN SERPL-MCNC: 17 MG/DL — SIGNIFICANT CHANGE UP (ref 7–23)
CALCIUM SERPL-MCNC: 9.8 MG/DL — SIGNIFICANT CHANGE UP (ref 8.4–10.5)
CHLORIDE SERPL-SCNC: 102 MMOL/L — SIGNIFICANT CHANGE UP (ref 96–108)
CO2 SERPL-SCNC: 24 MMOL/L — SIGNIFICANT CHANGE UP (ref 22–31)
CREAT SERPL-MCNC: 0.93 MG/DL — SIGNIFICANT CHANGE UP (ref 0.5–1.3)
EGFR: 87 ML/MIN/1.73M2 — SIGNIFICANT CHANGE UP
ESTIMATED AVERAGE GLUCOSE: 160 MG/DL — HIGH (ref 68–114)
GLUCOSE SERPL-MCNC: 132 MG/DL — HIGH (ref 70–99)
HCT VFR BLD CALC: 42.7 % — SIGNIFICANT CHANGE UP (ref 39–50)
HGB BLD-MCNC: 14 G/DL — SIGNIFICANT CHANGE UP (ref 13–17)
INR BLD: 1.04 RATIO — SIGNIFICANT CHANGE UP (ref 0.88–1.16)
MCHC RBC-ENTMCNC: 27.8 PG — SIGNIFICANT CHANGE UP (ref 27–34)
MCHC RBC-ENTMCNC: 32.8 GM/DL — SIGNIFICANT CHANGE UP (ref 32–36)
MCV RBC AUTO: 84.7 FL — SIGNIFICANT CHANGE UP (ref 80–100)
NRBC # BLD: 0 /100 WBCS — SIGNIFICANT CHANGE UP (ref 0–0)
PLATELET # BLD AUTO: 148 K/UL — LOW (ref 150–400)
POTASSIUM SERPL-MCNC: 4.3 MMOL/L — SIGNIFICANT CHANGE UP (ref 3.5–5.3)
POTASSIUM SERPL-SCNC: 4.3 MMOL/L — SIGNIFICANT CHANGE UP (ref 3.5–5.3)
PROT SERPL-MCNC: 7.2 G/DL — SIGNIFICANT CHANGE UP (ref 6–8.3)
PROTHROM AB SERPL-ACNC: 12 SEC — SIGNIFICANT CHANGE UP (ref 10.5–13.4)
RBC # BLD: 5.04 M/UL — SIGNIFICANT CHANGE UP (ref 4.2–5.8)
RBC # FLD: 13.6 % — SIGNIFICANT CHANGE UP (ref 10.3–14.5)
RH IG SCN BLD-IMP: POSITIVE — SIGNIFICANT CHANGE UP
SODIUM SERPL-SCNC: 138 MMOL/L — SIGNIFICANT CHANGE UP (ref 135–145)
WBC # BLD: 8.98 K/UL — SIGNIFICANT CHANGE UP (ref 3.8–10.5)
WBC # FLD AUTO: 8.98 K/UL — SIGNIFICANT CHANGE UP (ref 3.8–10.5)

## 2023-07-06 PROCEDURE — 83036 HEMOGLOBIN GLYCOSYLATED A1C: CPT

## 2023-07-06 PROCEDURE — 86850 RBC ANTIBODY SCREEN: CPT

## 2023-07-06 PROCEDURE — 86901 BLOOD TYPING SEROLOGIC RH(D): CPT

## 2023-07-06 PROCEDURE — 85027 COMPLETE CBC AUTOMATED: CPT

## 2023-07-06 PROCEDURE — 85730 THROMBOPLASTIN TIME PARTIAL: CPT

## 2023-07-06 PROCEDURE — G0463: CPT

## 2023-07-06 PROCEDURE — 86900 BLOOD TYPING SEROLOGIC ABO: CPT

## 2023-07-06 PROCEDURE — 85610 PROTHROMBIN TIME: CPT

## 2023-07-06 PROCEDURE — 80053 COMPREHEN METABOLIC PANEL: CPT

## 2023-07-06 RX ORDER — METFORMIN HYDROCHLORIDE 850 MG/1
1 TABLET ORAL
Refills: 0 | DISCHARGE

## 2023-07-06 NOTE — H&P PST ADULT - PROBLEM SELECTOR PLAN 2
continue ASA as per IR note  Hold Plavix x 5 days  will obtain recent EKG, cardiac note   metoprolol am of sx

## 2023-07-06 NOTE — H&P PST ADULT - NSICDXPASTMEDICALHX_GEN_ALL_CORE_FT
PAST MEDICAL HISTORY:  2019 novel coronavirus disease (COVID-19)     Arteriosclerosis     CAD (coronary artery disease)     DJD (degenerative joint disease)     DM2 (diabetes mellitus, type 2)     GERD (gastroesophageal reflux disease)     Hepatitis B     HTN (hypertension)     Hyperlipidemia     Liver cancer     Osteoporosis

## 2023-07-06 NOTE — H&P PST ADULT - ASSESSMENT
DASI score:  DASIactivity: can walk withotu cp/SOB  loose teeth or dentures: denies   airway mp2   DASI score: 5.7 on DASI score   DASIactivity: can walk 1-2 blocks without cp/SOB  loose teeth or dentures: denies   airway mp2

## 2023-07-06 NOTE — H&P PST ADULT - PROBLEM SELECTOR PLAN 3
A1c send  Hold Novolog / metformin am of sx  decrease Basaglar to 20 units night before the procedure

## 2023-07-06 NOTE — H&P PST ADULT - NSWEIGHTCALCTOOLDRUG_GEN_A_CORE
IR Drain Discharge Instructions:  -Drain to gravity. Do not flush.   -Empty drain daily and as needed, record daily outputs.  -Drain dressing change daily with gentle cleansing around drain insertion site.  -Do not submerge your drain site in any water, cover site to keep dry when showering.  -Call IR ([phone]) with fever greater than 100.4° F, increasing pain at drain site, or with any concern with drain function.  -Secure drain with all transfers and position changes as to not dislodge the drain.    IR Contact Phone Numbers:  [phone] M-F 8:00-4:30 p.m. (excluding holidays)  [phone] Saturdays and Sundays  [phone] ask for the on-call IR Physician to be paged if your phone call is not returned within 24 hours at the previous two numbers listed       used

## 2023-07-06 NOTE — H&P PST ADULT - PROBLEM SELECTOR PLAN 1
planned for Mapping angiogram and lung scan on 7/12/2023 and SIRT on 7/25/2023     PST labs send  preprocedure surgical scrub instructions discussed

## 2023-07-06 NOTE — H&P PST ADULT - HISTORY OF PRESENT ILLNESS
72 y/o male with pmhx of CAD s/p 4v CABG, T2DM, HTN, HBVx 20 years/ fatty liver , HCC planned for Mapping angiogram and lung scan on 7/12/2023   Pt had been following with Dr. Sj Squires for liver lesion concerning for HCC. HBV dx 20 + years, and fatty liver; was given 'antiviral' for many years and then 2 years ago was told by another MD to stop meds; recently was seen by Dr. Squires and was told to start entecavir. He was evaluated by Dr. Vieyra recently and his case was discussed at 4/27/23 [facility]. The consensus was to proceed with laparoscopic ablation vs segmental Y90. He presents today to discuss liver directed therapy.   PMH- Cad, DM x 20, high chol  PSH - CABG x 4 12/2019, 4 stents 2003, bilat cataracts   72 y/o male with PMH of CAD ( stents) s/p 4v CABG 2019, T2DM on Insulin, HTN, HBV x 20 years/ fatty liver, with HCC planned for Mapping angiogram and lung scan on 7/12/2023 and S[facility] on 7/25/2023       ***SIRT 7/25/2023 no 5000#

## 2023-07-06 NOTE — H&P PST ADULT - NSANTHOSAYNRD_GEN_A_CORE
No. URBANO screening performed.  STOP BANG Legend: 0-2 = LOW Risk; 3-4 = INTERMEDIATE Risk; 5-8 = HIGH Risk

## 2023-07-12 ENCOUNTER — APPOINTMENT (OUTPATIENT)
Dept: NUCLEAR MEDICINE | Facility: HOSPITAL | Age: 73
End: 2023-07-12

## 2023-07-12 ENCOUNTER — OUTPATIENT (OUTPATIENT)
Dept: OUTPATIENT SERVICES | Facility: HOSPITAL | Age: 73
LOS: 1 days | End: 2023-07-12
Payer: MEDICARE

## 2023-07-12 ENCOUNTER — TRANSCRIPTION ENCOUNTER (OUTPATIENT)
Age: 73
End: 2023-07-12

## 2023-07-12 ENCOUNTER — RESULT REVIEW (OUTPATIENT)
Age: 73
End: 2023-07-12

## 2023-07-12 VITALS
SYSTOLIC BLOOD PRESSURE: 112 MMHG | DIASTOLIC BLOOD PRESSURE: 59 MMHG | RESPIRATION RATE: 18 BRPM | HEART RATE: 66 BPM | TEMPERATURE: 98 F | OXYGEN SATURATION: 97 % | WEIGHT: 218.04 LBS | HEIGHT: 68 IN

## 2023-07-12 VITALS
RESPIRATION RATE: 18 BRPM | DIASTOLIC BLOOD PRESSURE: 59 MMHG | WEIGHT: 218.04 LBS | SYSTOLIC BLOOD PRESSURE: 112 MMHG | HEART RATE: 66 BPM | OXYGEN SATURATION: 97 % | TEMPERATURE: 98 F | HEIGHT: 68 IN

## 2023-07-12 DIAGNOSIS — Z98.890 OTHER SPECIFIED POSTPROCEDURAL STATES: Chronic | ICD-10-CM

## 2023-07-12 DIAGNOSIS — Z98.49 CATARACT EXTRACTION STATUS, UNSPECIFIED EYE: Chronic | ICD-10-CM

## 2023-07-12 DIAGNOSIS — Z01.818 ENCOUNTER FOR OTHER PREPROCEDURAL EXAMINATION: ICD-10-CM

## 2023-07-12 DIAGNOSIS — Z95.1 PRESENCE OF AORTOCORONARY BYPASS GRAFT: Chronic | ICD-10-CM

## 2023-07-12 DIAGNOSIS — C22.0 LIVER CELL CARCINOMA: ICD-10-CM

## 2023-07-12 LAB — GLUCOSE BLDC GLUCOMTR-MCNC: 133 MG/DL — HIGH (ref 70–99)

## 2023-07-12 PROCEDURE — G1004: CPT

## 2023-07-12 PROCEDURE — 78830 RP LOCLZJ TUM SPECT W/CT 1: CPT | Mod: MG

## 2023-07-12 PROCEDURE — A9540: CPT

## 2023-07-12 PROCEDURE — 75726 ARTERY X-RAYS ABDOMEN: CPT | Mod: 26

## 2023-07-12 PROCEDURE — 36245 INS CATH ABD/L-EXT ART 1ST: CPT | Mod: XS

## 2023-07-12 PROCEDURE — 77290 THER RAD SIMULAJ FIELD CPLX: CPT

## 2023-07-12 PROCEDURE — 36248 INS CATH ABD/L-EXT ART ADDL: CPT | Mod: 59

## 2023-07-12 PROCEDURE — 37243 VASC EMBOLIZE/OCCLUDE ORGAN: CPT

## 2023-07-12 PROCEDURE — 36245 INS CATH ABD/L-EXT ART 1ST: CPT | Mod: 59,RT

## 2023-07-12 PROCEDURE — 75726 ARTERY X-RAYS ABDOMEN: CPT

## 2023-07-12 PROCEDURE — 82962 GLUCOSE BLOOD TEST: CPT

## 2023-07-12 PROCEDURE — 36248 INS CATH ABD/L-EXT ART ADDL: CPT | Mod: 59,LT

## 2023-07-12 PROCEDURE — 79445 NUCLEAR RX INTRA-ARTERIAL: CPT

## 2023-07-12 PROCEDURE — C1887: CPT

## 2023-07-12 PROCEDURE — C1760: CPT

## 2023-07-12 PROCEDURE — C1894: CPT

## 2023-07-12 PROCEDURE — 77290 THER RAD SIMULAJ FIELD CPLX: CPT | Mod: 26

## 2023-07-12 PROCEDURE — C1769: CPT

## 2023-07-12 PROCEDURE — 75774 ARTERY X-RAY EACH VESSEL: CPT | Mod: 26

## 2023-07-12 PROCEDURE — 36247 INS CATH ABD/L-EXT ART 3RD: CPT | Mod: RT

## 2023-07-12 PROCEDURE — 75774 ARTERY X-RAY EACH VESSEL: CPT

## 2023-07-12 PROCEDURE — 78830 RP LOCLZJ TUM SPECT W/CT 1: CPT | Mod: 26,MG

## 2023-07-12 PROCEDURE — 36247 INS CATH ABD/L-EXT ART 3RD: CPT

## 2023-07-12 RX ORDER — ONDANSETRON 8 MG/1
4 TABLET, FILM COATED ORAL ONCE
Refills: 0 | Status: DISCONTINUED | OUTPATIENT
Start: 2023-07-12 | End: 2023-07-26

## 2023-07-12 RX ORDER — ACETAMINOPHEN 500 MG
1000 TABLET ORAL ONCE
Refills: 0 | Status: DISCONTINUED | OUTPATIENT
Start: 2023-07-12 | End: 2023-07-26

## 2023-07-12 RX ORDER — HYDROMORPHONE HYDROCHLORIDE 2 MG/ML
0.25 INJECTION INTRAMUSCULAR; INTRAVENOUS; SUBCUTANEOUS
Refills: 0 | Status: DISCONTINUED | OUTPATIENT
Start: 2023-07-12 | End: 2023-07-12

## 2023-07-12 RX ORDER — HYDROMORPHONE HYDROCHLORIDE 2 MG/ML
0.5 INJECTION INTRAMUSCULAR; INTRAVENOUS; SUBCUTANEOUS
Refills: 0 | Status: DISCONTINUED | OUTPATIENT
Start: 2023-07-12 | End: 2023-07-12

## 2023-07-12 NOTE — ASU DISCHARGE PLAN (ADULT/PEDIATRIC) - ASU DC SPECIAL INSTRUCTIONSFT
Hepatic angiogram/mapping discharge instructions:    - There may be a small area of bruising around the groin site.                   - You may resume your normal diet.  - You may resume your normal medications as instructed.  - Do not drive, engage in heavy lifting or strenuous activity, or drink any alcoholic beverages for the next 24 hours.     If you have a problem that you believe requires IMMEDIATE attention, please go to your NEAREST Emergency Room.    Notify your primary physician and/or Interventional Radiology IMMEDIATELY if you experience any of the following       - Fever of 101F or 38C       - Chills or Rigors/ Shakes       - Worsening Pain       - Blood soaked bandages or worsening bleeding       - Lightheadedness and/or dizziness upon standing       - Chest Pain/ Tightness       - Shortness of Breath       - Difficulty walking    Follow Up Instructions:   - Please call the IR Office at [phone] with any questions about your upcoming radioembolization (Y90) treatment.

## 2023-07-12 NOTE — ASU PATIENT PROFILE, ADULT - BLOOD AVOIDANCE/RESTRICTIONS, PROFILE
[facility]    OCCUPATIONAL THERAPY EVALUATION  PLAN OF TREATMENT FOR OUTPATIENT REHABILITATION  (COMPLETE FOR INITIAL CLAIMS ONLY)  Patient's Last Name, First Name, M.I.  YOB: 2018  Ambrosio Dennison                        Provider s Name: [facility] Medical Record No.  [id number]     Onset Date: 11/14/2022    Start of Care Date: 11/14/22   Type:     ___PT  _X_OT   ___SLP    Medical Diagnosis:  Sensory processing difficulty, attention and concentration deficit, behavior concern   Occupational Therapy Diagnosis:  Emotional regulation deficits, sensory processing deficits, fine motor delays    Visits from SOC: 1      _________________________________________________________________________________  Plan of Treatment/Functional Goals:  Planned Therapy Interventions:    Therapeutic Activities , Self-Care/ADL, Neuromuscular Re-education, Sensory Integration       Goals  Goal Identifier: STG 1  Goal Description: Matt will appropriately use 2 new calming strategies that he can use when feeling frustrated, overwhelmed, or 'stressed' throughout his treatment session, 50% of trials as measure of improved emotional regulation.  Target Date: 02/11/23    Goal Identifier: STG 2  Goal Description: Matt will demonstrate improved self modulation and focus by completing a 4-step age appropriate motor activity without error or being distracted by other peers or items in gym, 50% of trials as measure of improved sensory processing skills.  Target Date: 02/11/23    Goal Identifier: STG 3  Goal Description: Matt will transition to/from the therapy session without becoming upset and with minmal (1-2) redirection 75% of the times as measured by clinical notes and parent report.  Target Date: 02/11/23    Goal Identifier: STG 4  Goal Description: Matt will use a 3-point grasp on a  writing utensil to draw a Jena with the start and stop points within 1-inch of each other 50% of opportunities in therapy, as measure of improved fine motor skills.  Target Date: 02/11/23                    Therapy Frequency: 2x/month  Predicted Duration of Therapy Intervention: 6 months    Reyna Jc OT         I CERTIFY THE NEED FOR THESE SERVICES FURNISHED UNDER        THIS PLAN OF TREATMENT AND WHILE UNDER MY CARE     (Physician co-signature of this document indicates review and certification of the therapy plan).                Certification Period:  11/14/2022  to 2/11/2023             Referring Physician:  Shanel Blanca DO    Initial Assessment        See Epic Evaluation Start of Care Date: 11/14/22                                                          none

## 2023-07-12 NOTE — PRE PROCEDURE NOTE - PRE PROCEDURE EVALUATION
------------------------------------------------------------  Interventional Radiology Pre-Procedure Note  ------------------------------------------------------------    Indication: 73y Male with pmh of Hep B found to have HCC in segment 7 and segment 2 presents for mapping angiogram and administration of MAA.     Past Medical History:  Arteriosclerosis    Diabetes 1.5, managed as type 2    Heartburn    Hepatitis B    Hyperlipidemia    GERD (gastroesophageal reflux disease)    HTN (hypertension)    CAD (coronary artery disease)    DJD (degenerative joint disease)    Osteoporosis    Liver cancer    2019 novel coronavirus disease (COVID-19)    DM2 (diabetes mellitus, type 2)        Allergies: No Known Allergies  No Pork (Unknown)  Beef (Other (Mod to Severe))  Chicken (Other (Mod to Severe))      Medications:        Vital Signs:   T(F): 98.4 (09:39), Max: 98.4 (09:39)  HR: 66 (09:39)  BP: 112/59 (09:39)  RR: 18 (09:39)  SpO2: 97% (09:39)    Labs:           14.0  8.98)-----(148     (07-06-23 @ 13:25)         42.7     138 | 102 | 17  --------------------< 132     (07-06-23 @ 13:25)  4.3 | 24 | 0.93       PT: 12.0 07-06-23 @ 13:25  aPTT: 29.1 07-06-23 @ 13:25   INR: 1.04 07-06-23 @ 13:25    Imaging: Most recent MRI Abdomen is reviewed.     Consent: Risks/benefits/alternatives were explained to patient and informed written consent was obtained.     Procedure Plan: Plan for mapping angiogram and administration of MAA today.       ------------------------------------------------------------  Interventional Radiology Pre-Procedure Note  ------------------------------------------------------------    Indication: 73y Male with pmh of Hep B found to have HCC in segment 7 and segment 2 presents for mapping angiogram and administration of MAA.     Past Medical History:  Arteriosclerosis    Diabetes 1.5, managed as type 2    Heartburn    Hepatitis B    Hyperlipidemia    GERD (gastroesophageal reflux disease)    HTN (hypertension)    CAD (coronary artery disease)    DJD (degenerative joint disease)    Osteoporosis    Liver cancer    2019 novel coronavirus disease (COVID-19)    DM2 (diabetes mellitus, type 2)        Allergies: No Known Allergies  No Pork (Unknown)  Beef (Other (Mod to Severe))  Chicken (Other (Mod to Severe))    Vital Signs:   T(F): 98.4 (09:39), Max: 98.4 (09:39)  HR: 66 (09:39)  BP: 112/59 (09:39)  RR: 18 (09:39)  SpO2: 97% (09:39)    Labs:           14.0  8.98)-----(148     (07-06-23 @ 13:25)         42.7     138 | 102 | 17  --------------------< 132     (07-06-23 @ 13:25)  4.3 | 24 | 0.93       PT: 12.0 07-06-23 @ 13:25  aPTT: 29.1 07-06-23 @ 13:25   INR: 1.04 07-06-23 @ 13:25    Imaging: Most recent MRI Abdomen is reviewed.     Consent: Risks/benefits/alternatives were explained to patient and family and informed written consent was obtained.     Plavix held 5 days per cardiologist.    Procedure Plan: Plan for mapping angiogram and administration of MAA today.

## 2023-07-12 NOTE — PROCEDURE NOTE - PROCEDURE FINDINGS AND DETAILS
Right common femoral artery puncture.   Status post mapping angiogram with demonstration of segment 7 and possible segment 2 lesions. MAA was administered in the right hepatic artery.   Puncture was closed using the Celt closure device and manual pressure, then covered with dry sterile dressing.

## 2023-07-12 NOTE — PROCEDURE NOTE - PLAN
Keep right lower extremity extended.   Plan for imaging to be done in the nuclear medicine department after procedure.

## 2023-07-12 NOTE — PRE-ANESTHESIA EVALUATION ADULT - NSRADCARDRESULTSFT_GEN_ALL_CORE
TTE: 2018: Mild MR, Mild AI, LVH, normal LV function, LVEF 60%  Stress Test: 2019: no ischemia, normal perfusion  Cardiac Cath: , -patent stents LAD, LCx and RCA, 80% D1, 2019: 3 vessel CAD with normal LV function  CAB, CABG x 4 with SVG to PDA, OM1, Diag and LIMA to LAD

## 2023-07-12 NOTE — ASU DISCHARGE PLAN (ADULT/PEDIATRIC) - NS MD DC FALL RISK RISK
For information on Fall & Injury Prevention, visit: [email or web address] OR  [email or web address] OR  https://www.cdc.gov/steadi/patient.html

## 2023-07-12 NOTE — PRE-ANESTHESIA EVALUATION ADULT - NSANTHPMHFT_GEN_ALL_CORE
PCI with stents: 2003, 2006,   CAB    CKD PCI with stents: , ,   CAB    CKD  GERD: well controlled  DM2 FSG this AM was 130    Plavix last taken 5 days prior.

## 2023-07-19 PROBLEM — U07.1 COVID-19: Chronic | Status: ACTIVE | Noted: 2023-07-06

## 2023-07-19 PROBLEM — E11.9 TYPE 2 DIABETES MELLITUS WITHOUT COMPLICATIONS: Chronic | Status: ACTIVE | Noted: 2023-07-06

## 2023-07-20 DIAGNOSIS — C22.0 LIVER CELL CARCINOMA: ICD-10-CM

## 2023-07-25 ENCOUNTER — RESULT REVIEW (OUTPATIENT)
Age: 73
End: 2023-07-25

## 2023-07-25 ENCOUNTER — TRANSCRIPTION ENCOUNTER (OUTPATIENT)
Age: 73
End: 2023-07-25

## 2023-07-25 ENCOUNTER — OUTPATIENT (OUTPATIENT)
Dept: OUTPATIENT SERVICES | Facility: HOSPITAL | Age: 73
LOS: 1 days | End: 2023-07-25
Payer: MEDICARE

## 2023-07-25 VITALS
OXYGEN SATURATION: 100 % | SYSTOLIC BLOOD PRESSURE: 182 MMHG | WEIGHT: 218.04 LBS | HEART RATE: 78 BPM | DIASTOLIC BLOOD PRESSURE: 97 MMHG | HEIGHT: 68 IN | TEMPERATURE: 98 F | RESPIRATION RATE: 18 BRPM

## 2023-07-25 VITALS
DIASTOLIC BLOOD PRESSURE: 88 MMHG | HEART RATE: 70 BPM | RESPIRATION RATE: 18 BRPM | SYSTOLIC BLOOD PRESSURE: 162 MMHG | OXYGEN SATURATION: 98 %

## 2023-07-25 DIAGNOSIS — K74.60 UNSPECIFIED CIRRHOSIS OF LIVER: ICD-10-CM

## 2023-07-25 DIAGNOSIS — Z95.1 PRESENCE OF AORTOCORONARY BYPASS GRAFT: Chronic | ICD-10-CM

## 2023-07-25 DIAGNOSIS — C22.0 LIVER CELL CARCINOMA: ICD-10-CM

## 2023-07-25 DIAGNOSIS — B19.10 UNSPECIFIED VIRAL HEPATITIS B WITHOUT HEPATIC COMA: ICD-10-CM

## 2023-07-25 DIAGNOSIS — Z98.49 CATARACT EXTRACTION STATUS, UNSPECIFIED EYE: Chronic | ICD-10-CM

## 2023-07-25 DIAGNOSIS — C22.9 MALIGNANT NEOPLASM OF LIVER, NOT SPECIFIED AS PRIMARY OR SECONDARY: ICD-10-CM

## 2023-07-25 DIAGNOSIS — Z98.890 OTHER SPECIFIED POSTPROCEDURAL STATES: Chronic | ICD-10-CM

## 2023-07-25 LAB
AFP-TM SERPL-MCNC: 26.9 NG/ML — HIGH
ALBUMIN SERPL ELPH-MCNC: 4.2 G/DL — SIGNIFICANT CHANGE UP (ref 3.3–5)
ALP SERPL-CCNC: 101 U/L — SIGNIFICANT CHANGE UP (ref 40–120)
ALT FLD-CCNC: 21 U/L — SIGNIFICANT CHANGE UP (ref 10–45)
ANION GAP SERPL CALC-SCNC: 12 MMOL/L — SIGNIFICANT CHANGE UP (ref 5–17)
APTT BLD: 28.9 SEC — SIGNIFICANT CHANGE UP (ref 27.5–35.5)
AST SERPL-CCNC: 23 U/L — SIGNIFICANT CHANGE UP (ref 10–40)
BASOPHILS # BLD AUTO: 0.04 K/UL — SIGNIFICANT CHANGE UP (ref 0–0.2)
BASOPHILS NFR BLD AUTO: 0.6 % — SIGNIFICANT CHANGE UP (ref 0–2)
BILIRUB SERPL-MCNC: 0.6 MG/DL — SIGNIFICANT CHANGE UP (ref 0.2–1.2)
BUN SERPL-MCNC: 16 MG/DL — SIGNIFICANT CHANGE UP (ref 7–23)
CALCIUM SERPL-MCNC: 9.7 MG/DL — SIGNIFICANT CHANGE UP (ref 8.4–10.5)
CHLORIDE SERPL-SCNC: 101 MMOL/L — SIGNIFICANT CHANGE UP (ref 96–108)
CO2 SERPL-SCNC: 25 MMOL/L — SIGNIFICANT CHANGE UP (ref 22–31)
CREAT SERPL-MCNC: 1.08 MG/DL — SIGNIFICANT CHANGE UP (ref 0.5–1.3)
EGFR: 72 ML/MIN/1.73M2 — SIGNIFICANT CHANGE UP
EOSINOPHIL # BLD AUTO: 0.21 K/UL — SIGNIFICANT CHANGE UP (ref 0–0.5)
EOSINOPHIL NFR BLD AUTO: 3.2 % — SIGNIFICANT CHANGE UP (ref 0–6)
GLUCOSE SERPL-MCNC: 142 MG/DL — HIGH (ref 70–99)
HCT VFR BLD CALC: 46.2 % — SIGNIFICANT CHANGE UP (ref 39–50)
HGB BLD-MCNC: 14.8 G/DL — SIGNIFICANT CHANGE UP (ref 13–17)
IMM GRANULOCYTES NFR BLD AUTO: 0.5 % — SIGNIFICANT CHANGE UP (ref 0–0.9)
INR BLD: 1.02 RATIO — SIGNIFICANT CHANGE UP (ref 0.88–1.16)
LYMPHOCYTES # BLD AUTO: 1.4 K/UL — SIGNIFICANT CHANGE UP (ref 1–3.3)
LYMPHOCYTES # BLD AUTO: 21.6 % — SIGNIFICANT CHANGE UP (ref 13–44)
MCHC RBC-ENTMCNC: 27.9 PG — SIGNIFICANT CHANGE UP (ref 27–34)
MCHC RBC-ENTMCNC: 32 GM/DL — SIGNIFICANT CHANGE UP (ref 32–36)
MCV RBC AUTO: 87.2 FL — SIGNIFICANT CHANGE UP (ref 80–100)
MONOCYTES # BLD AUTO: 0.72 K/UL — SIGNIFICANT CHANGE UP (ref 0–0.9)
MONOCYTES NFR BLD AUTO: 11.1 % — SIGNIFICANT CHANGE UP (ref 2–14)
NEUTROPHILS # BLD AUTO: 4.09 K/UL — SIGNIFICANT CHANGE UP (ref 1.8–7.4)
NEUTROPHILS NFR BLD AUTO: 63 % — SIGNIFICANT CHANGE UP (ref 43–77)
NRBC # BLD: 0 /100 WBCS — SIGNIFICANT CHANGE UP (ref 0–0)
PLATELET # BLD AUTO: 142 K/UL — LOW (ref 150–400)
POTASSIUM SERPL-MCNC: 5 MMOL/L — SIGNIFICANT CHANGE UP (ref 3.5–5.3)
POTASSIUM SERPL-SCNC: 5 MMOL/L — SIGNIFICANT CHANGE UP (ref 3.5–5.3)
PROT SERPL-MCNC: 7.6 G/DL — SIGNIFICANT CHANGE UP (ref 6–8.3)
PROTHROM AB SERPL-ACNC: 11.8 SEC — SIGNIFICANT CHANGE UP (ref 10.5–13.4)
RBC # BLD: 5.3 M/UL — SIGNIFICANT CHANGE UP (ref 4.2–5.8)
RBC # FLD: 13.6 % — SIGNIFICANT CHANGE UP (ref 10.3–14.5)
SODIUM SERPL-SCNC: 138 MMOL/L — SIGNIFICANT CHANGE UP (ref 135–145)
WBC # BLD: 6.49 K/UL — SIGNIFICANT CHANGE UP (ref 3.8–10.5)
WBC # FLD AUTO: 6.49 K/UL — SIGNIFICANT CHANGE UP (ref 3.8–10.5)

## 2023-07-25 PROCEDURE — 36247 INS CATH ABD/L-EXT ART 3RD: CPT

## 2023-07-25 PROCEDURE — 37243 VASC EMBOLIZE/OCCLUDE ORGAN: CPT

## 2023-07-25 PROCEDURE — 76380 CAT SCAN FOLLOW-UP STUDY: CPT | Mod: 26,XU,MH

## 2023-07-25 PROCEDURE — 85610 PROTHROMBIN TIME: CPT

## 2023-07-25 PROCEDURE — 82105 ALPHA-FETOPROTEIN SERUM: CPT

## 2023-07-25 PROCEDURE — 85730 THROMBOPLASTIN TIME PARTIAL: CPT

## 2023-07-25 PROCEDURE — 36247 INS CATH ABD/L-EXT ART 3RD: CPT | Mod: RT

## 2023-07-25 PROCEDURE — C1769: CPT

## 2023-07-25 PROCEDURE — C1887: CPT

## 2023-07-25 PROCEDURE — 85025 COMPLETE CBC W/AUTO DIFF WBC: CPT

## 2023-07-25 PROCEDURE — 76380 CAT SCAN FOLLOW-UP STUDY: CPT | Mod: XU

## 2023-07-25 PROCEDURE — C1760: CPT

## 2023-07-25 PROCEDURE — C1894: CPT

## 2023-07-25 PROCEDURE — 80053 COMPREHEN METABOLIC PANEL: CPT

## 2023-07-25 RX ORDER — SODIUM CHLORIDE 9 MG/ML
1000 INJECTION INTRAMUSCULAR; INTRAVENOUS; SUBCUTANEOUS
Refills: 0 | Status: DISCONTINUED | OUTPATIENT
Start: 2023-07-25 | End: 2023-08-08

## 2023-07-25 RX ORDER — ONDANSETRON 8 MG/1
4 TABLET, FILM COATED ORAL ONCE
Refills: 0 | Status: DISCONTINUED | OUTPATIENT
Start: 2023-07-25 | End: 2023-08-08

## 2023-07-25 NOTE — PRE PROCEDURE NOTE - PRE PROCEDURE EVALUATION
Vascular & Interventional Radiology Pre-Procedure Note    Procedure Name: hepatic arteriography, radioembolization of hepatic tumor    HPI: 73y Male with hep B and HCC s/p mapping angiography now presents for radioembolization.     Allergies: No Known Allergies      Medications:   Home Medications:  Aspir 81 oral delayed release tablet: 1 orally once a day (at bedtime) (25 Jul 2023 07:52)  Basaglar KwikPen 100 units/mL subcutaneous solution: 26 unit(s) subcutaneous once a day (at bedtime) (25 Jul 2023 07:52)  entecavir 0.5 mg oral tablet: 1 orally once a day (25 Jul 2023 07:52)  folic acid 1 mg oral tablet: 1 orally once a day (25 Jul 2023 07:52)  metFORMIN 1000 mg oral tablet: 1 orally 2 times a day (25 Jul 2023 07:52)  metoprolol tartrate 25 mg oral tablet: 0.5 tab(s) orally 2 times a day (25 Jul 2023 07:52)  NovoLOG 100 units/mL subcutaneous solution: 10 unit(s) subcutaneous 3 times a day (with meals) (25 Jul 2023 07:52)  oxyBUTYnin 10 mg/24 hr oral tablet, extended release: 1 orally once a day (at bedtime) (25 Jul 2023 07:52)  Protonix 20 mg oral delayed release tablet: 1 orally once a day (at bedtime) (25 Jul 2023 07:52)  rosuvastatin 20 mg oral tablet: 1 tab(s) orally once a day (at bedtime) (25 Jul 2023 07:52)        Data:  Vital Signs Last 24 Hrs  T(C): 36.8 (25 Jul 2023 07:48), Max: 36.8 (25 Jul 2023 07:48)  T(F): 98.3 (25 Jul 2023 07:48), Max: 98.3 (25 Jul 2023 07:48)  HR: 78 (25 Jul 2023 07:48) (78 - 78)  BP: 182/97 (25 Jul 2023 07:48) (182/97 - 182/97)  BP(mean): --  RR: 18 (25 Jul 2023 07:48) (18 - 18)  SpO2: 100% (25 Jul 2023 07:48) (100% - 100%)        LABS:                        14.8   6.49  )-----------( 142      ( 25 Jul 2023 08:12 )             46.2     07-25    138  |  101  |  16  ----------------------------<  142<H>  5.0   |  25  |  1.08    Ca    9.7      25 Jul 2023 08:12    TPro  7.6  /  Alb  4.2  /  TBili  0.6  /  DBili  x   /  AST  23  /  ALT  21  /  AlkPhos  101  07-25    PT/INR - ( 25 Jul 2023 08:12 )   PT: 11.8 sec;   INR: 1.02 ratio         PTT - ( 25 Jul 2023 08:12 )  PTT:28.9 sec    Plan:   -73y Male presents for hepatic arteriography, radioembolization of hepatic tumor  -Risks/Benefits/alternatives explained with the patient and witnessed informed consent obtained.

## 2023-07-25 NOTE — ASU DISCHARGE PLAN (ADULT/PEDIATRIC) - ASU DC SPECIAL INSTRUCTIONSFT
Post Y90 Selective Internal Radiation Therapy Instructions    Initial Discharge Instructions  - You underwent a Y90 radioembolization to treat your liver tumor.  - You may have mild abdominal pain, nausea, loss of appetite, fatigue, and/or low grade fever.  These are normal after your procedure and they should resolve within 3-5 days.  Please call Interventional Radiology if you have a fever > 101.0 F.  If you have persistent nausea, contact Interventional Radiology and we can prescribe anti-nausea medication.  - Monitor right groin site for symptoms of bleeding, hardness underneath the incision site, bruising, numbness, intense pain, or inability to move.  If you have any of these symptoms, contact Interventional Radiology and seek immediate medical attention  - Please report chills, temperature > 101.0F, persistent nausea or vomiting, severe abdominal or leg pain, confusion, yellowing of the skin, or abdominal swelling.  - Please refer to the "Radiation Safety Instructions" that were provided.  Please adhere to them for the 72 hours after your procedure.   - You may shower in 24 hours. You may resume normal activity in 24 hours.  - Do not perform any heavy lifting or put tension on the area for the next 48 hours.  - You may resume your normal diet.  - You may resume your normal medications however you should wait 24 hours before restarting aspirin, plavix, or blood thinners.  - It is normal to experience some pain over the site for the next few days. You may take apply ice to the area (20 minutes on, 20 minutes off) and take Motrin for that pain. Do not take more frequently than every 6 hours.  - You were given conscious sedation which may make you drowsy, therefore you need someone to stay with you until the morning following the procedure.  - Do not drive, engage in heavy lifting or strenuous activity, or drink any alcoholic beverages for the next 24 hours.     Notify your primary physician and/or Interventional Radiology IMMEDIATELY if you experience any of the following       - Fever of 101.0F       - Chills or Rigors/ Shakes       - Swelling, Hardness, Redness, or Bleeding at the Groin Site       - Worsening Abdominal or Leg Pain       - Worsening Abdominal Swelling       - Skin Yellowing       - Lightheadedness and/or Dizziness Upon Standing       - Chest Pain/ Tightness       - Shortness of Breath       - Difficulty Walking    If you have a problem that you believe requires IMMEDIATE attention, please go to your NEAREST Emergency Room. If you believe your problem can safely wait until you speak to a physician, please call Interventional Radiology for any concerns.    During Normal Weekday Business Hours- You can contact the Interventional Radiology department during normal business hours via telephone, [phone].  During Evenings and Weekends- If you need to contact Interventional Radiology during off hours, do so by calling the hospital and requesting to be connected to the Interventional Radiologist on call.

## 2023-07-25 NOTE — PROCEDURE NOTE - PROCEDURE FINDINGS AND DETAILS
technically successful US and fluoroscopic guided hepatic arteriography with radioembolization of segment 7. R groin hemostasis achieved with Celt device. [facility]

## 2023-07-25 NOTE — ASU DISCHARGE PLAN (ADULT/PEDIATRIC) - CALL YOUR DOCTOR IF YOU HAVE ANY OF THE FOLLOWING:
Pain not relieved by Medications/Fever greater than (need to indicate Fahrenheit or Celsius)
Tracy Odonnell

## 2023-07-25 NOTE — PRE PROCEDURE NOTE - GENERAL PROCEDURE NAME
Detail Level: Generalized Quality 130: Documentation Of Current Medications In The Medical Record: Current Medications Documented hepatic arteriography, radioembolization of hepatic tumor

## 2023-07-25 NOTE — ASU DISCHARGE PLAN (ADULT/PEDIATRIC) - NURSING INSTRUCTIONS
Please feel free to contact us at [phone] if any problems arise. After 6PM, Monday through Friday, on weekends and on holidays, please call [phone] and ask for the radiology resident on call to be paged.

## 2023-07-25 NOTE — ASU DISCHARGE PLAN (ADULT/PEDIATRIC) - NS MD DC FALL RISK RISK
For information on Fall & Injury Prevention, visit: [email or web address] OR  [email or web address] OR  https://www.cdc.gov/steadi/patient.html

## 2023-07-25 NOTE — ASU PATIENT PROFILE, ADULT - PATIENT'S HEIGHT AND WEIGHT RECORDED IN THE VITAL SIGNS FLOWSHEET
[address] [facility]        NAME: Cecilia Crow is a 1 y o  male  : 2016    MRN: [id number]  DATE: October 3, 2019  TIME: 6:16 PM    Assessment and Plan   Right non-suppurative otitis media [H65 91]  1  Right non-suppurative otitis media  amoxicillin (AMOXIL) 400 MG/5ML suspension         Patient Instructions     -start antibiotics as directed   -drink plenty of fluids  -impacted cerumen removed by irrigation   Follow up with PCP in 3-5 days  Proceed to  ER if symptoms worsen  Chief Complaint     Chief Complaint   Patient presents with   Ranny Octave     started today in right ear         History of Present Illness       Patient presents with his mother for evaluation of right ear pain  He started complaining of right ear pain today  He had a history of ear infections when he was younger and had tubes placed  He has not have any fevers, chills, nausea, vomiting, diarrhea, cough  Review of Systems   Review of Systems   Constitutional: Negative  HENT: Positive for ear pain  Negative for ear discharge and sore throat  Eyes: Negative  Respiratory: Negative  Cardiovascular: Negative  Gastrointestinal: Negative  Musculoskeletal: Negative  Skin: Negative  Neurological: Negative            Current Medications       Current Outpatient Medications:     amoxicillin (AMOXIL) 400 MG/5ML suspension, Take 9 7 mL (776 mg total) by mouth 2 (two) times a day for 10 days, Disp: 194 mL, Rfl: 0    EPINEPHrine (EPIPEN JR) 0 15 mg/0 3 mL SOAJ, Inject 0 15 mg as directed once as needed, Disp: , Rfl:     Current Allergies     Allergies as of 10/03/2019    (No Known Allergies)            The following portions of the patient's history were reviewed and updated as appropriate: allergies, current medications, past family history, past medical history, past social history, past surgical history and problem list      Past Medical History:   Diagnosis Date    Allergic angioedema     last assessed yes 12/4/17    Constipation     Dermoid cyst of scalp     last assessed 7/20/16    Ear infection     Obstruction of left lacrimal duct in infant     last assessed 5/6/1    Positional plagiocephaly     last assessed 7/20/16       Past Surgical History:   Procedure Laterality Date    CIRCUMCISION      MYRINGOTOMY W/ TUBES         Family History   Problem Relation Age of Onset    Anemia Mother         Copied from mother's history at birth   Rashida Credit Hypertension Mother         Copied from mother's history at birth   Rashida [employer] Mental illness Mother         Copied from mother's history at birth   Rashida [employer] Anxiety disorder Mother     Bipolar disorder Mother     Depression Mother     Endometriosis Mother     No Known Problems Father     Hypertension Maternal Grandfather     Hemophilia Other          Medications have been verified  Objective   Pulse 115   Temp 98 1 °F (36 7 °C) (Temporal)   Resp 22   Ht 3' 3" (0 991 m)   Wt 17 2 kg (38 lb)   SpO2 98%   BMI 17 57 kg/m²        Physical Exam     Physical Exam   Constitutional: He appears well-developed and well-nourished  He is active  No distress  HENT:   Left Ear: Tympanic membrane normal    Mouth/Throat: Mucous membranes are moist  No tonsillar exudate  Pharynx is normal    Right TM - erythematous, bulging, dull  Left TM- normal, impacted cerumen in ear canal was removed, tube appears to be out of TM and in ear canal    Eyes: EOM are normal    Cardiovascular: Normal rate and regular rhythm  Pulmonary/Chest: Effort normal and breath sounds normal    Neurological: He is alert  Skin: Skin is warm and dry  He is not diaphoretic  Nursing note and vitals reviewed    Ear cerumen removal  Date/Time: 10/3/2019 6:15 PM  Performed by: Katherene Duverney, PA-C  Authorized by: Katherene Duverney, PA-C     Consent:     Consent obtained:  Verbal    Consent given by:  Patient and parent  Universal protocol:     Patient identity confirmed:  Verbally with patient  Procedure details: Location:  L ear    Procedure type: irrigation with insturmentation      Insturmentation: loop      Approach:  External  Post-procedure details:     Complication:  None    Hearing quality:  Normal    Patient tolerance of procedure: Tolerated well, no immediate complications  Comments:      Cerumen was removed  Tube remained in ear canal   It should fall out on its own  Follow up with pediatrician,

## 2023-07-26 ENCOUNTER — APPOINTMENT (OUTPATIENT)
Age: 73
End: 2023-07-26

## 2023-07-31 ENCOUNTER — APPOINTMENT (OUTPATIENT)
Dept: HEPATOLOGY | Facility: CLINIC | Age: 73
End: 2023-07-31
Payer: MEDICARE

## 2023-07-31 PROCEDURE — 99215 OFFICE O/P EST HI 40 MIN: CPT

## 2023-07-31 NOTE — PHYSICAL EXAM
[General Appearance - Alert] : alert [General Appearance - In No Acute Distress] : in no acute distress [Sclera] : the sclera and conjunctiva were normal [Neck Appearance] : the appearance of the neck was normal [] : no respiratory distress [Respiration, Rhythm And Depth] : normal respiratory rhythm and effort [Auscultation Breath Sounds / Voice Sounds] : lungs were clear to auscultation bilaterally [Heart Rate And Rhythm] : heart rate was normal and rhythm regular [Heart Sounds] : normal S1 and S2 [Bowel Sounds] : normal bowel sounds [Abdomen Soft] : soft [Abdomen Tenderness] : non-tender [Oriented To Time, Place, And Person] : oriented to person, place, and time [Scleral Icterus] : No Scleral Icterus

## 2023-07-31 NOTE — ASSESSMENT
[FreeTextEntry1] : 74 y/o with HBV and HCC s/p Y-90 of segment 7 lesion. Imaging and labs reviewed. Following issues were d/w pt in detail.  s/p Y-90 with subjective fevers - improving will do labs today; abd exam is benign ? transient bacteremia vs abscess formation; advised him to check temp when he feels feverish and report; will decide on cross sectional imaging based on labs; will also consider abx f/u with Dr Larson's team for repeat imaging and consideration of segment 2 lesion f/u in 2 months

## 2023-07-31 NOTE — HISTORY OF PRESENT ILLNESS
[de-identified] : 72 y/o male with a PMH of CAD, T2DM, HLD, Obesity, NAFLD, Chronic HBV and HCC, here for follow-up. He had Y-90 of segment 7 lesion done 7/25; states he had fever and chills the first night and since then has been having subjective fevers, w/o taking temps, no associated GI or ID sx. Overall states its getting better. Accompanied by son does not have f/u visit with IR team    Patient established care with us after referral from Dr. Squires for evaluation of liver lesion c/f HCC. Patient with long standing hx of HBV and fatty liver (~ 20 yrs) -- HBV tx with 'antiviral' (cannot recall which) for many years and then about 2 yrs ago was advised to d/c by MD. Recently seen by Dr. Squires and started on Entecavir 0.5 mg/day. No episodes of liver decompensation. No prior liver bx. No known family hx of liver disease. Born in [place] and in [place] many years. Previously worked in construction and now retired. Lives at home with family. No current or hx of ETOH use. No IVDU. No blood transfusions. No OTC med/supps/herbals.    4/19/23 MRI Abd reviewed on 4/27/23 Hepatobiliary Tumor Board: liver with borderline morphology, no classic posterior notching or widened fissures however nodularity on posterior right lobe c/w early signs of cirrhosis. Lesion #1: Segment 7, 2.2 cm, LR 5 + Lesion #2: Segment 2, 2 cm, LR 5. Consensus recommendation to IR for LDT (Segment 7 lesion ablation/Y90 & Segment 2 lesion Y90). 5/4/23 CT Chest done and report pending. IR Referral for consult with Dr. Neo summers.

## 2023-08-02 ENCOUNTER — NON-APPOINTMENT (OUTPATIENT)
Age: 73
End: 2023-08-02

## 2023-08-02 LAB
AFP-TM SERPL-MCNC: 14.9 NG/ML
ALBUMIN SERPL ELPH-MCNC: 4.3 G/DL
ALP BLD-CCNC: 106 U/L
ALT SERPL-CCNC: 136 U/L
ANION GAP SERPL CALC-SCNC: 14 MMOL/L
AST SERPL-CCNC: 127 U/L
BILIRUB SERPL-MCNC: 0.8 MG/DL
BUN SERPL-MCNC: 14 MG/DL
CALCIUM SERPL-MCNC: 9.8 MG/DL
CHLORIDE SERPL-SCNC: 101 MMOL/L
CO2 SERPL-SCNC: 25 MMOL/L
CREAT SERPL-MCNC: 1.08 MG/DL
EGFR: 72 ML/MIN/1.73M2
GLUCOSE SERPL-MCNC: 140 MG/DL
HCT VFR BLD CALC: 47.7 %
HGB BLD-MCNC: 15.1 G/DL
INR PPP: 0.98 RATIO
MCHC RBC-ENTMCNC: 28.4 PG
MCHC RBC-ENTMCNC: 31.7 GM/DL
MCV RBC AUTO: 89.7 FL
PLATELET # BLD AUTO: 93 K/UL
POTASSIUM SERPL-SCNC: 4.7 MMOL/L
PROT SERPL-MCNC: 7.7 G/DL
PT BLD: 11.1 SEC
RBC # BLD: 5.32 M/UL
RBC # FLD: 14.1 %
SODIUM SERPL-SCNC: 140 MMOL/L
WBC # FLD AUTO: 7.02 K/UL

## 2023-08-08 LAB
ALBUMIN SERPL ELPH-MCNC: 4 G/DL
ALP BLD-CCNC: 123 U/L
ALT SERPL-CCNC: 34 U/L
ANION GAP SERPL CALC-SCNC: 13 MMOL/L
AST SERPL-CCNC: 29 U/L
BILIRUB SERPL-MCNC: 0.6 MG/DL
BUN SERPL-MCNC: 14 MG/DL
CALCIUM SERPL-MCNC: 9.4 MG/DL
CHLORIDE SERPL-SCNC: 104 MMOL/L
CO2 SERPL-SCNC: 23 MMOL/L
CREAT SERPL-MCNC: 0.95 MG/DL
EGFR: 85 ML/MIN/1.73M2
GLUCOSE SERPL-MCNC: 154 MG/DL
POTASSIUM SERPL-SCNC: 4.4 MMOL/L
PROT SERPL-MCNC: 7 G/DL
SODIUM SERPL-SCNC: 140 MMOL/L

## 2023-08-18 ENCOUNTER — NON-APPOINTMENT (OUTPATIENT)
Age: 73
End: 2023-08-18

## 2023-08-25 ENCOUNTER — APPOINTMENT (OUTPATIENT)
Dept: INTERVENTIONAL RADIOLOGY/VASCULAR | Facility: CLINIC | Age: 73
End: 2023-08-25

## 2023-08-25 PROCEDURE — XXXXX: CPT | Mod: 1L

## 2023-08-25 NOTE — PHYSICAL EXAM
[Alert] : alert [No Acute Distress] : no acute distress [Well Nourished] : well nourished [Well Developed] : well developed [Healthy Appearance] : healthy appearance [Normal Voice/Communication] : normal voice communication [Normal Sclera/Conjunctiva] : normal sclera/conjunctiva [No Respiratory Distress] : no respiratory distress [Normal Rate and Effort] : normal respiratory rhythm and effort [No Accessory Muscle Use] : no accessory muscle use [Oriented x3] : oriented to person, place, and time [Normal Insight/Judgement] : insight and judgment were intact [Normal Affect] : the affect was normal [Normal Mood] : the mood was normal [Recent Memory Normal] : recent memory was not impaired [Remote Memory Normal] : remote memory was not impaired [Restricted in physically strenuous activity but ambulatory and able to carry out work of a light or sedentary nature] : Restricted in physically strenuous activity but ambulatory and able to carry out work of a light or sedentary nature, e.g., light house work, office work

## 2023-08-25 NOTE — ASSESSMENT
[FreeTextEntry1] :  This is a 72 y/o male with pmhx of CAD s/p 4v CABG, T2DM, HTN, HBV, and new diagnosis of HCC who presents today for follow up for liver directed therapy.    1.  HCC - hx of HBV, currently on Entacavir - 4/19/23 MRI segment 7 with a 2.2 cm LR-5 lesion and there is a 2 cm LR-5 lesion in segment 2 - His case was discuseed at 4/27/23 [facility], consensus was to proceed with laparoscopic ablation vs segmental Y90 -  s/p Y90 to segment 7 on 7/25/23, needs Y90 tx for segment 2 lesion - I reviewed the procedure, including the mapping angiogram, risks (infection, bleeding, elevated LFTs, ERICK), benefits, alternatives, and expected post procedure course. - I reviewed post procedure radiation safety precautions - chest CT 5/4/23 - NM bone scan 5/12/23 - currently on PPI - last MRI was in May, needs repeat, scheduled for 8/29 - f/u after MRI to finalize treatment plans  2.  Elevated AFP - preprocedure level was 13 - most recent was 14.9 on 7/31 - will repeat on dos and continue to trend  3.  CAD - s/p 4 stents in 2003 and 4v CABG in 12/2019 - currently on Plavix and aspirin - previously reached out to cardiology, Dr Abreu, the pt may hold plavix 5 days pre op but is to remain on asa   4.  T2DM - hold meds per PST - fs on arrival - recent HgA1c 7.6  I have provided the patient the opportunity to ask questions and have answered them to their satisfaction.  They are encouraged to contact our office with any further questions, concerns, or issues.   [SIRT Procedure & Planning] : SIRT procedure and planning discussed at length

## 2023-08-25 NOTE — HISTORY OF PRESENT ILLNESS
[FreeTextEntry1] : This is a 72 y/o male with pmhx of CAD s/p 4v CABG, T2DM, HTN, HBV, and new diagnosis of HCC who presents today for follow up for liver directed therapy.    Pt had been following with Dr. Sj Squires for liver lesion concerning for HCC. HBV dx 20 + years, and fatty liver; was given 'antiviral' for many years and then 2 years ago was told by another MD to stop meds; recently was seen by Dr. Squires and was told to start entecavir. He was evaluated by Dr. Vieyra recently and his case was discussed at 4/27/23 [facility].  The consensus was to proceed with laparoscopic ablation vs segmental Y90.  He had initial evaluation in 6/2023 and underwent Y90 to segment 7 on 7/25/23.  He returns today for follow up.    Post procedure, he denies complaints, reports to be feeling well.    PMH- Cad, DM x 20, high chol PSH - CABG x 4 12/2019, 4 stents 2003, bilat cataracts FH - no liver dz SH- born in [place], [place] many years, retired construction; lives with family (wife, 4 children), no tob, no etoh, no drugs, no tatoo, no blood transfusion; no herbals   PMD: Francis Ly [facility]: unsure name at time of consult Cards: Sharif Abreu Hep: Jarrell

## 2023-08-29 ENCOUNTER — APPOINTMENT (OUTPATIENT)
Dept: MRI IMAGING | Facility: CLINIC | Age: 73
End: 2023-08-29
Payer: MEDICARE

## 2023-08-29 PROCEDURE — 74183 MRI ABD W/O CNTR FLWD CNTR: CPT

## 2023-08-29 PROCEDURE — A9585: CPT

## 2023-09-05 PROBLEM — I10 HTN (HYPERTENSION): Status: ACTIVE | Noted: 2019-12-23

## 2023-09-05 PROBLEM — Z95.1 S/P CORONARY ARTERY BYPASS GRAFT X 4: Status: ACTIVE | Noted: 2019-12-17

## 2023-09-08 ENCOUNTER — APPOINTMENT (OUTPATIENT)
Dept: INTERVENTIONAL RADIOLOGY/VASCULAR | Facility: CLINIC | Age: 73
End: 2023-09-08

## 2023-09-08 DIAGNOSIS — Z95.1 PRESENCE OF AORTOCORONARY BYPASS GRAFT: ICD-10-CM

## 2023-09-08 DIAGNOSIS — E11.9 TYPE 2 DIABETES MELLITUS W/OUT COMPLICATIONS: ICD-10-CM

## 2023-09-08 DIAGNOSIS — I10 ESSENTIAL (PRIMARY) HYPERTENSION: ICD-10-CM

## 2023-09-08 DIAGNOSIS — I25.10 ATHEROSCLEROTIC HEART DISEASE OF NATIVE CORONARY ARTERY W/OUT ANGINA PECTORIS: ICD-10-CM

## 2023-09-08 PROCEDURE — XXXXX: CPT | Mod: 1L

## 2023-09-08 RX ORDER — METFORMIN HYDROCHLORIDE 1000 MG/1
1000 TABLET, COATED ORAL
Refills: 0 | Status: ACTIVE | COMMUNITY
Start: 2023-09-08

## 2023-09-08 RX ORDER — DULAGLUTIDE 3 MG/.5ML
3 INJECTION, SOLUTION SUBCUTANEOUS WEEKLY
Refills: 0 | Status: ACTIVE | COMMUNITY
Start: 2023-09-08

## 2023-09-08 RX ORDER — INSULIN GLARGINE 100 [IU]/ML
100 INJECTION, SOLUTION SUBCUTANEOUS DAILY
Refills: 1 | Status: ACTIVE | COMMUNITY

## 2023-09-08 NOTE — ASSESSMENT
[SIRT Procedure & Planning] : SIRT procedure and planning discussed at length [FreeTextEntry1] :  This is a 74 y/o male with pmhx of CAD s/p 4v CABG, T2DM, HTN, HBV, and new diagnosis of HCC who presents today for follow up for liver directed therapy.    1.  HCC - hx of HBV, currently on Entacavir -  s/p Y90 to segment 7 on 7/25/23 - 8/29/23 MRI demonstrates post treatment changes in segment 7 lesion without evidence of viable tumor (LR-TR nonviable). The previously referenced segment 2 lesion poorly visualized on the current study secondary to artifact in this region.  - imaging reviewed and d/w patient and son.  While lesion was poorly visualized on current study, it was visualized on previous imaging and during the mapping, so he needs Y90 tx for segment 2 lesion - I reviewed the procedure, including the mapping angiogram, risks (infection, bleeding, elevated LFTs, ERICK), benefits, alternatives, and expected post procedure course. - I reviewed post procedure radiation safety precautions - chest CT 5/4/23 - NM bone scan 5/12/23 - currently on PPI - continue to follow with Dr. Vieyra, has f/u 9/26  2.  Elevated AFP - preprocedure level was 13 - most recent was 14.9 on 7/31 - will repeat on dos and continue to trend  3.  CAD - s/p 4 stents in 2003 and 4v CABG in 12/2019 - currently on Plavix and aspirin - previously reached out to cardiology, Dr Abreu, the pt may hold plavix 5 days pre op but is to remain on asa   4.  T2DM - Currently on Basaglar, Novolog, Trulcity, and Metformin - reports having issues with mild hypogylcemia recently.  States morning glucose is <100, usually ~85 - pt to take 80% Basaglar dose the evening before procedure=18 units - Hold Trulicity 1 week before procedure - hold Novolog and Metformin on dos - pt is unsure where he placed his previous PST instructions, requesting I email him above instructions.  - fs on arrival - recent HgA1c 7.6  I have provided the patient the opportunity to ask questions and have answered them to their satisfaction.  They are encouraged to contact our office with any further questions, concerns, or issues.

## 2023-09-08 NOTE — HISTORY OF PRESENT ILLNESS
[FreeTextEntry1] : This visit was conducted by 2 way audio visual technology.   This is a 74 y/o male with pmhx of CAD s/p 4v CABG, T2DM, HTN, HBV, and new diagnosis of HCC who presents today for follow up for liver directed therapy.    Pt had been following with Dr. Sj Squires for liver lesion concerning for HCC. HBV dx 20 + years, and fatty liver; was given 'antiviral' for many years and then 2 years ago was told by another MD to stop meds; recently was seen by Dr. Squires and was told to start entecavir. He was evaluated by Dr. Vieyra recently and his case was discussed at 4/27/23 [facility].  The consensus was to proceed with laparoscopic ablation vs segmental Y90.  He had initial evaluation in 6/2023 and underwent Y90 to segment 7 on 7/25/23.  He returns today for discussion of further Y90 s/p recent MRI.  He reports to be feeling well, still with some fatigue (same level as prior to procedure), takes naps every day for about 30 minutes-1 hour. Had some worsening fatigue and chills for the week following previous Y90, but is back to his previous state of health.  Has been having some issues with hypoglycemia, after starting Trulicity a few months ago.   Denies hospitalizations, has been monitoring closely with glucose monitor.  Denies abdominal pain, discoloration of the skin/eyes, abdominal distention, LE edema, confusion, hematochezia, melena, or n/v/d.    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: unsure name at time of consult Cards: Sharif Abreu Hep: Jarrell

## 2023-09-08 NOTE — REASON FOR VISIT
[Consultation] : a consultation visit [Other: _____] : [unfilled] [FreeTextEntry1] : liver directed therapy [Follow-Up: _____] : a [unfilled] follow-up visit

## 2023-09-08 NOTE — DATA REVIEWED
[FreeTextEntry1] :   EXAM: [id number] - MR ABDOMEN WAW IC - ORDERED BY: JANETH OLIVER   PROCEDURE DATE: 08/29/2023    INTERPRETATION: CLINICAL INFORMATION: Hepatocellular carcinoma status post radioembolization to a segment 7 lesion on 7/25/2023. Pending treatment for a segment 2 lesion.  COMPARISON: MRI 4/19/2023 CT chest 5/4/2023.  CONTRAST/COMPLICATIONS: IV Contrast: Gadavist 7.5 cc administered 0 cc discarded Oral Contrast: NONE Complications: None reported at time of study completion  PROCEDURE: MRI of the abdomen was performed. MRCP was performed.  FINDINGS: LOWER CHEST: Within normal limits.  LIVER: Mild enlargement of the caudate lobe with medial segment atrophy. Status post interval radioembolization of a segment 7 lesion as follows:  Lesion #: 1 Location: Segment 7 Size: 2.3 cm treatment cavity (15:46), previously 2.2 cm Enhancement: Yes; Thin peripheral rim/Treatment specific expected enhancement. Washout: NO LI-RADS v 2018 Category: LR-TR Nonviable  A previously referenced segment 2 lesion is poorly visualized on the current study secondary to artifact in this region. Margins of the lesion can best be identified at prior CT chest 5/4/2023 when it measured approximately 2.0 cm.  BILE DUCTS: Normal caliber. GALLBLADDER: Within normal limits. SPLEEN: Within normal limits. PANCREAS: Within normal limits. ADRENALS: Within normal limits. KIDNEYS/URETERS: Within normal limits.  VISUALIZED PORTIONS: BOWEL: Within normal limits. PERITONEUM: No ascites. VESSELS: SMV, portal and hepatic veins are patent. RETROPERITONEUM/LYMPH NODES: No lymphadenopathy. ABDOMINAL WALL: Within normal limits. BONES: Within normal limits.  IMPRESSION: Status post interval radioembolization of segment 7 lesion without evidence of viable tumor (LR-TR nonviable).  A previously referenced segment 2 lesion poorly visualized on the current study secondary to artifact in this region. This was well visualized at prior chest CT. If pretreatment assessment is warranted, consider triple phase CT.    --- End of Report ---

## 2023-09-18 ENCOUNTER — OUTPATIENT (OUTPATIENT)
Dept: OUTPATIENT SERVICES | Facility: HOSPITAL | Age: 73
LOS: 1 days | End: 2023-09-18
Payer: MEDICARE

## 2023-09-18 ENCOUNTER — RESULT REVIEW (OUTPATIENT)
Age: 73
End: 2023-09-18

## 2023-09-18 ENCOUNTER — TRANSCRIPTION ENCOUNTER (OUTPATIENT)
Age: 73
End: 2023-09-18

## 2023-09-18 VITALS
DIASTOLIC BLOOD PRESSURE: 66 MMHG | HEART RATE: 81 BPM | RESPIRATION RATE: 20 BRPM | SYSTOLIC BLOOD PRESSURE: 133 MMHG | OXYGEN SATURATION: 97 %

## 2023-09-18 VITALS
WEIGHT: 205.03 LBS | SYSTOLIC BLOOD PRESSURE: 139 MMHG | RESPIRATION RATE: 16 BRPM | TEMPERATURE: 98 F | DIASTOLIC BLOOD PRESSURE: 63 MMHG | OXYGEN SATURATION: 96 % | HEART RATE: 66 BPM

## 2023-09-18 DIAGNOSIS — Z95.1 PRESENCE OF AORTOCORONARY BYPASS GRAFT: Chronic | ICD-10-CM

## 2023-09-18 DIAGNOSIS — Z95.1 PRESENCE OF AORTOCORONARY BYPASS GRAFT: ICD-10-CM

## 2023-09-18 DIAGNOSIS — C22.0 LIVER CELL CARCINOMA: ICD-10-CM

## 2023-09-18 DIAGNOSIS — Z98.49 CATARACT EXTRACTION STATUS, UNSPECIFIED EYE: Chronic | ICD-10-CM

## 2023-09-18 DIAGNOSIS — Z98.890 OTHER SPECIFIED POSTPROCEDURAL STATES: Chronic | ICD-10-CM

## 2023-09-18 LAB
AFP-TM SERPL-MCNC: 13.7 NG/ML — HIGH
ALBUMIN SERPL ELPH-MCNC: 4.2 G/DL — SIGNIFICANT CHANGE UP (ref 3.3–5)
ALP SERPL-CCNC: 93 U/L — SIGNIFICANT CHANGE UP (ref 40–120)
ALT FLD-CCNC: 20 U/L — SIGNIFICANT CHANGE UP (ref 10–45)
ANION GAP SERPL CALC-SCNC: 14 MMOL/L — SIGNIFICANT CHANGE UP (ref 5–17)
APTT BLD: 31.8 SEC — SIGNIFICANT CHANGE UP (ref 24.5–35.6)
AST SERPL-CCNC: 27 U/L — SIGNIFICANT CHANGE UP (ref 10–40)
BILIRUB SERPL-MCNC: 0.5 MG/DL — SIGNIFICANT CHANGE UP (ref 0.2–1.2)
BUN SERPL-MCNC: 12 MG/DL — SIGNIFICANT CHANGE UP (ref 7–23)
CALCIUM SERPL-MCNC: 9.3 MG/DL — SIGNIFICANT CHANGE UP (ref 8.4–10.5)
CHLORIDE SERPL-SCNC: 105 MMOL/L — SIGNIFICANT CHANGE UP (ref 96–108)
CO2 SERPL-SCNC: 20 MMOL/L — LOW (ref 22–31)
CREAT SERPL-MCNC: 1.06 MG/DL — SIGNIFICANT CHANGE UP (ref 0.5–1.3)
EGFR: 74 ML/MIN/1.73M2 — SIGNIFICANT CHANGE UP
GLUCOSE SERPL-MCNC: 133 MG/DL — HIGH (ref 70–99)
HCT VFR BLD CALC: 43 % — SIGNIFICANT CHANGE UP (ref 39–50)
HGB BLD-MCNC: 13.8 G/DL — SIGNIFICANT CHANGE UP (ref 13–17)
INR BLD: 1.06 RATIO — SIGNIFICANT CHANGE UP (ref 0.85–1.18)
MCHC RBC-ENTMCNC: 27.7 PG — SIGNIFICANT CHANGE UP (ref 27–34)
MCHC RBC-ENTMCNC: 32.1 GM/DL — SIGNIFICANT CHANGE UP (ref 32–36)
MCV RBC AUTO: 86.3 FL — SIGNIFICANT CHANGE UP (ref 80–100)
NRBC # BLD: 0 /100 WBCS — SIGNIFICANT CHANGE UP (ref 0–0)
PLATELET # BLD AUTO: 150 K/UL — SIGNIFICANT CHANGE UP (ref 150–400)
POTASSIUM SERPL-MCNC: 4 MMOL/L — SIGNIFICANT CHANGE UP (ref 3.5–5.3)
POTASSIUM SERPL-SCNC: 4 MMOL/L — SIGNIFICANT CHANGE UP (ref 3.5–5.3)
PROT SERPL-MCNC: 7.8 G/DL — SIGNIFICANT CHANGE UP (ref 6–8.3)
PROTHROM AB SERPL-ACNC: 11.1 SEC — SIGNIFICANT CHANGE UP (ref 9.5–13)
RBC # BLD: 4.98 M/UL — SIGNIFICANT CHANGE UP (ref 4.2–5.8)
RBC # FLD: 14.4 % — SIGNIFICANT CHANGE UP (ref 10.3–14.5)
SODIUM SERPL-SCNC: 139 MMOL/L — SIGNIFICANT CHANGE UP (ref 135–145)
WBC # BLD: 5.32 K/UL — SIGNIFICANT CHANGE UP (ref 3.8–10.5)
WBC # FLD AUTO: 5.32 K/UL — SIGNIFICANT CHANGE UP (ref 3.8–10.5)

## 2023-09-18 PROCEDURE — C1760: CPT

## 2023-09-18 PROCEDURE — 77300 RADIATION THERAPY DOSE PLAN: CPT

## 2023-09-18 PROCEDURE — 85610 PROTHROMBIN TIME: CPT

## 2023-09-18 PROCEDURE — 36248 INS CATH ABD/L-EXT ART ADDL: CPT

## 2023-09-18 PROCEDURE — C1769: CPT

## 2023-09-18 PROCEDURE — C1887: CPT

## 2023-09-18 PROCEDURE — 36415 COLL VENOUS BLD VENIPUNCTURE: CPT

## 2023-09-18 PROCEDURE — 36247 INS CATH ABD/L-EXT ART 3RD: CPT | Mod: LT

## 2023-09-18 PROCEDURE — 36247 INS CATH ABD/L-EXT ART 3RD: CPT

## 2023-09-18 PROCEDURE — 77300 RADIATION THERAPY DOSE PLAN: CPT | Mod: 26

## 2023-09-18 PROCEDURE — 77263 THER RADIOLOGY TX PLNG CPLX: CPT

## 2023-09-18 PROCEDURE — 76937 US GUIDE VASCULAR ACCESS: CPT | Mod: 26

## 2023-09-18 PROCEDURE — C1894: CPT

## 2023-09-18 PROCEDURE — 85730 THROMBOPLASTIN TIME PARTIAL: CPT

## 2023-09-18 PROCEDURE — 85027 COMPLETE CBC AUTOMATED: CPT

## 2023-09-18 PROCEDURE — 76937 US GUIDE VASCULAR ACCESS: CPT

## 2023-09-18 PROCEDURE — 80053 COMPREHEN METABOLIC PANEL: CPT

## 2023-09-18 PROCEDURE — 79445 NUCLEAR RX INTRA-ARTERIAL: CPT | Mod: 26

## 2023-09-18 PROCEDURE — 86901 BLOOD TYPING SEROLOGIC RH(D): CPT

## 2023-09-18 PROCEDURE — C2616: CPT

## 2023-09-18 PROCEDURE — 86850 RBC ANTIBODY SCREEN: CPT

## 2023-09-18 PROCEDURE — 79445 NUCLEAR RX INTRA-ARTERIAL: CPT

## 2023-09-18 PROCEDURE — 82105 ALPHA-FETOPROTEIN SERUM: CPT

## 2023-09-18 PROCEDURE — 86900 BLOOD TYPING SEROLOGIC ABO: CPT

## 2023-09-18 RX ORDER — ENTECAVIR 0.5 MG/1
1 TABLET ORAL
Refills: 0 | DISCHARGE

## 2023-09-18 RX ORDER — ROSUVASTATIN CALCIUM 5 MG/1
1 TABLET ORAL
Refills: 0 | DISCHARGE

## 2023-09-18 RX ORDER — OXYBUTYNIN CHLORIDE 5 MG
1 TABLET ORAL
Refills: 0 | DISCHARGE

## 2023-09-18 RX ORDER — FOLIC ACID 0.8 MG
1 TABLET ORAL
Refills: 0 | DISCHARGE

## 2023-09-18 RX ORDER — METFORMIN HYDROCHLORIDE 850 MG/1
1 TABLET ORAL
Refills: 0 | DISCHARGE

## 2023-09-18 RX ORDER — INSULIN GLARGINE 100 [IU]/ML
26 INJECTION, SOLUTION SUBCUTANEOUS
Refills: 0 | DISCHARGE

## 2023-09-18 RX ORDER — ASPIRIN/CALCIUM CARB/MAGNESIUM 324 MG
1 TABLET ORAL
Refills: 0 | DISCHARGE

## 2023-09-18 RX ORDER — METOPROLOL TARTRATE 50 MG
0.5 TABLET ORAL
Refills: 0 | DISCHARGE

## 2023-09-18 RX ORDER — PANTOPRAZOLE SODIUM 20 MG/1
1 TABLET, DELAYED RELEASE ORAL
Refills: 0 | DISCHARGE

## 2023-09-18 RX ORDER — INSULIN ASPART 100 [IU]/ML
10 INJECTION, SOLUTION SUBCUTANEOUS
Refills: 0 | DISCHARGE

## 2023-09-18 NOTE — PRE PROCEDURE NOTE - PRE PROCEDURE EVALUATION
Interventional Radiology Brief H and P    This is a 73y Male with HCC. Segment 2 lesion treatment is requested.     PAST MEDICAL & SURGICAL HISTORY:  Arteriosclerosis      Hepatitis B      Hyperlipidemia      GERD (gastroesophageal reflux disease)      HTN (hypertension)      CAD (coronary artery disease)      DJD (degenerative joint disease)      Osteoporosis      Liver cancer      2019 novel coronavirus disease (COVID-19)      DM2 (diabetes mellitus, type 2)      History of cardiac catheterization  with 4 stents 2003,2006,2012      Status post cataract extraction  left eye done on 10/2/2019      S/P CABG x 4  12/03/19           Vitals:Vital Signs Last 24 Hrs  T(C): 36.5 (18 Sep 2023 11:22), Max: 36.5 (18 Sep 2023 11:22)  T(F): 97.7 (18 Sep 2023 11:22), Max: 97.7 (18 Sep 2023 11:22)  HR: 66 (18 Sep 2023 11:22) (66 - 66)  BP: 139/63 (18 Sep 2023 11:22) (139/63 - 139/63)  BP(mean): --  RR: 16 (18 Sep 2023 11:22) (16 - 16)  SpO2: 96% (18 Sep 2023 11:22) (96% - 96%)        Allergies: Allergies    No Known Allergies    Intolerances    Beef (Other (Mod to Severe))  Chicken (Other (Mod to Severe))  No Pork (Unknown)      Physical Exam:     Labs:                         13.8   5.32  )-----------( 150      ( 18 Sep 2023 11:31 )             43.0     09-18    139  |  105  |  12  ----------------------------<  133<H>  4.0   |  20<L>  |  1.06    Ca    9.3      18 Sep 2023 11:31    TPro  7.8  /  Alb  4.2  /  TBili  0.5  /  DBili  x   /  AST  27  /  ALT  20  /  AlkPhos  93  09-18    PT/INR - ( 18 Sep 2023 11:31 )   PT: 11.1 sec;   INR: 1.06 ratio         PTT - ( 18 Sep 2023 11:31 )  PTT:31.8 sec    Procedure and risks discussed with patient and he is agreeable to proceed. Will treat left lobe lesion after discussion with radiology team.     Informed consent obtained. All questions and concerns have been addressed at this time.

## 2023-09-18 NOTE — ASU DISCHARGE PLAN (ADULT/PEDIATRIC) - ASU DC SPECIAL INSTRUCTIONSFT
Post Y90 Radioembolization Discharge Instructions    - You underwent a radioembolization to treat your liver tumor    - Monitor right groin site for symptoms of bleeding, hard area underneath the skin, bruising, numbness, intense pain, or inability to move.  If you have any of these symptoms, contact your doctor and seek immediate medical attention    - You may have mild abdominal pain, nausea, loss of appetite, fatigue, and/or low grade fever.  These are normal after your procedure and they should resolve within 3-5 days.  Please call Interventional Radiology if you have a fever > 102.0 F.  If you have persistent nausea, contact IR and we can prescribe anti-nausea medication.     - Please report chills, temperature > 101.0, persistent nausea or vomiting, severe pain, confusion, yellowing of the skin, or abdominal swelling.    - Please refer to the " Post [facility] Radiation Safety Instructions" sheet that was provided.  Please adhere to them for 72 hours after your procedure.     - Continue your PPI (Nexium, Prilosec, Protonix) for 30 days post procedure.    - A follow up phone call will be performed the day after the procedure.     - Blood work is to be performed 2 weeks after your procedure (you will be given a paper prescription).  To locate the closest [facility] lab, call [phone]. If you have labwork performed at a NON-[facility] lab, please request that the results be faxed to [phone].      - Please call the IR booking department at [phone] to schedule a follow up visit in 1 month.     - Follow up with your hepatologist or oncologist in 2 weeks.     - Post procedure imaging study is to be performed 2 months post procedure (CT or MRI).  You will be given a prescription for this at your initial 1 month follow up visit. Our booking office will obtain authorization from your insurance company, if required.    - Please contact the Nurse Practitioner with any questions or concerns directly at [phone] or [phone] from 8 am to 6 pm, Monday - Friday.  If after weekday hours, on weekends or holidays, please call [phone] and ask to speak with the "Interventional Radiology Resident on-call".  When you speak with the Resident, let them know what procedure you had and they will get in touch with the Interventional Radiology Attending Physician who is on-call.

## 2023-09-18 NOTE — ASU DISCHARGE PLAN (ADULT/PEDIATRIC) - NURSING INSTRUCTIONS
Please feel free to contact us at [phone] if any problems arise. After 6PM, Monday through Friday, on weekends and on holidays, please call [phone] and ask for the radiology resident on call to be paged.

## 2023-09-18 NOTE — ASU PATIENT PROFILE, ADULT - FALL HARM RISK - UNIVERSAL INTERVENTIONS
Bed in lowest position, wheels locked, appropriate side rails in place/Call bell, personal items and telephone in reach/Instruct patient to call for assistance before getting out of bed or chair/Non-slip footwear when patient is out of bed/[place] to call system/Physically safe environment - no spills, clutter or unnecessary equipment/Purposeful Proactive Rounding/Room/bathroom lighting operational, light cord in reach

## 2023-09-18 NOTE — PRE-ANESTHESIA EVALUATION ADULT - NSANTHPEFT_GEN_ALL_CORE
Assessment: Body structures, Functions, Activity limitations: Decreased functional mobility , Decreased ROM, Decreased strength, Decreased safe awareness, Decreased balance, Decreased high-level IADLs, Decreased endurance  Assessment: Fair tolerance . Moderate assistance with bed mobility and transfers. Good carryover to maintain THR precautions. Anxious , but cooperative. Patient would benefit from continued skilled physical therapy to increase strength for improved functional mobility. Prognosis: Good  Discharge Recommendations: Continue to assess pending progress    Patient Education:  Patient Education: hip precautions, bed mobility, therex    Equipment Recommendations:  Equipment Needed: Yes (Pt has RW. Continue to assess need for wheelchair.)    Safety:  Type of devices: All fall risk precautions in place, Call light within reach, Bed alarm in place, Left in bed    Plan:  Times per week: 5x/week 90 min, 1x/week 30 min   Times per day: Daily  Specific instructions for Next Treatment: continue bed mobility, transfer and gait training, BLE strengthening, review precautions  Current Treatment Recommendations: Strengthening, ROM, Balance Training, Functional Mobility Training, Transfer Training, Gait Training, Endurance Training, Home Exercise Program, Safety Education & Training, Patient/Caregiver Education & Training    Goals:  Patient goals : To return home     Short term goals  Time Frame for Short term goals: 1 wk  Short term goal 1: Patient will perform bed mobility Min +1 to decrease risk of pressure ulcers    Short term goal 2: Patient will perform functional transfers Min +1, consistently, to sit in bedside chair   Short term goal 3: Patient will ambulate >= 25 ft x1 with  RW and CGA to progress to home mobility. Short term goal 4: Patient will perform car transfer min +1 for transportation needs. Short term goal 5: Pt to walk with RW on/off elevator, min +1 , for access to condo.     Long term GENERAL: NAD  HEAD:  Atraumatic, Normocephalic  CHEST/LUNG: Clear to auscultation bilaterally  HEART: Normal S1/S2  PSYCH: AAOx3

## 2023-09-18 NOTE — ASU PREOP CHECKLIST - HAND OFF
Elidel Pregnancy And Lactation Text: This medication is Pregnancy Category C. It is unknown if this medication is excreted in breast milk. Holding RN to OR RN

## 2023-09-19 ENCOUNTER — NON-APPOINTMENT (OUTPATIENT)
Age: 73
End: 2023-09-19

## 2023-09-19 ENCOUNTER — APPOINTMENT (OUTPATIENT)
Dept: INTERVENTIONAL RADIOLOGY/VASCULAR | Facility: CLINIC | Age: 73
End: 2023-09-19

## 2023-09-26 ENCOUNTER — APPOINTMENT (OUTPATIENT)
Dept: HEPATOLOGY | Facility: CLINIC | Age: 73
End: 2023-09-26
Payer: MEDICARE

## 2023-09-26 VITALS
TEMPERATURE: 98.6 F | HEIGHT: 68 IN | WEIGHT: 204 LBS | OXYGEN SATURATION: 98 % | RESPIRATION RATE: 16 BRPM | HEART RATE: 71 BPM | BODY MASS INDEX: 30.92 KG/M2 | SYSTOLIC BLOOD PRESSURE: 138 MMHG | DIASTOLIC BLOOD PRESSURE: 78 MMHG

## 2023-09-26 PROCEDURE — 99215 OFFICE O/P EST HI 40 MIN: CPT

## 2023-09-26 RX ORDER — FOLIC ACID 1 MG/1
1 TABLET ORAL
Refills: 0 | Status: DISCONTINUED | COMMUNITY
Start: 2021-08-27 | End: 2023-09-26

## 2023-10-03 LAB
ALBUMIN SERPL ELPH-MCNC: 4.3 G/DL
ALP BLD-CCNC: 98 U/L
ALT SERPL-CCNC: 15 U/L
ANION GAP SERPL CALC-SCNC: 14 MMOL/L
AST SERPL-CCNC: 19 U/L
BILIRUB SERPL-MCNC: 0.7 MG/DL
BUN SERPL-MCNC: 15 MG/DL
CALCIUM SERPL-MCNC: 9.9 MG/DL
CHLORIDE SERPL-SCNC: 102 MMOL/L
CO2 SERPL-SCNC: 24 MMOL/L
CREAT SERPL-MCNC: 1.02 MG/DL
EGFR: 78 ML/MIN/1.73M2
GLUCOSE SERPL-MCNC: 155 MG/DL
HCT VFR BLD CALC: 41.5 %
HGB BLD-MCNC: 13.4 G/DL
MCHC RBC-ENTMCNC: 28.3 PG
MCHC RBC-ENTMCNC: 32.3 GM/DL
MCV RBC AUTO: 87.6 FL
PLATELET # BLD AUTO: 150 K/UL
POTASSIUM SERPL-SCNC: 4.6 MMOL/L
PROT SERPL-MCNC: 7.4 G/DL
RBC # BLD: 4.74 M/UL
RBC # FLD: 14.1 %
SODIUM SERPL-SCNC: 140 MMOL/L
WBC # FLD AUTO: 6.18 K/UL

## 2023-10-19 ENCOUNTER — APPOINTMENT (OUTPATIENT)
Dept: INTERVENTIONAL RADIOLOGY/VASCULAR | Facility: CLINIC | Age: 73
End: 2023-10-19
Payer: MEDICARE

## 2023-10-19 PROCEDURE — 99214 OFFICE O/P EST MOD 30 MIN: CPT | Mod: 95

## 2023-10-27 ENCOUNTER — NON-APPOINTMENT (OUTPATIENT)
Age: 73
End: 2023-10-27

## 2023-10-31 ENCOUNTER — APPOINTMENT (OUTPATIENT)
Dept: MRI IMAGING | Facility: CLINIC | Age: 73
End: 2023-10-31
Payer: MEDICARE

## 2023-10-31 ENCOUNTER — OUTPATIENT (OUTPATIENT)
Dept: OUTPATIENT SERVICES | Facility: HOSPITAL | Age: 73
LOS: 1 days | End: 2023-10-31
Payer: MEDICARE

## 2023-10-31 DIAGNOSIS — Z98.890 OTHER SPECIFIED POSTPROCEDURAL STATES: Chronic | ICD-10-CM

## 2023-10-31 DIAGNOSIS — Z95.1 PRESENCE OF AORTOCORONARY BYPASS GRAFT: Chronic | ICD-10-CM

## 2023-10-31 DIAGNOSIS — C22.9 MALIGNANT NEOPLASM OF LIVER, NOT SPECIFIED AS PRIMARY OR SECONDARY: ICD-10-CM

## 2023-10-31 DIAGNOSIS — Z98.49 CATARACT EXTRACTION STATUS, UNSPECIFIED EYE: Chronic | ICD-10-CM

## 2023-10-31 PROCEDURE — 74183 MRI ABD W/O CNTR FLWD CNTR: CPT | Mod: 26,MH

## 2023-10-31 PROCEDURE — 74183 MRI ABD W/O CNTR FLWD CNTR: CPT

## 2023-11-02 LAB — AFP-TM SERPL-MCNC: 10 NG/ML

## 2023-11-09 ENCOUNTER — APPOINTMENT (OUTPATIENT)
Dept: INTERVENTIONAL RADIOLOGY/VASCULAR | Facility: CLINIC | Age: 73
End: 2023-11-09

## 2023-11-14 ENCOUNTER — APPOINTMENT (OUTPATIENT)
Dept: HEPATOLOGY | Facility: CLINIC | Age: 73
End: 2023-11-14
Payer: MEDICARE

## 2023-11-14 VITALS
TEMPERATURE: 98.1 F | SYSTOLIC BLOOD PRESSURE: 144 MMHG | WEIGHT: 200 LBS | HEIGHT: 68 IN | OXYGEN SATURATION: 99 % | BODY MASS INDEX: 30.31 KG/M2 | HEART RATE: 72 BPM | DIASTOLIC BLOOD PRESSURE: 73 MMHG | RESPIRATION RATE: 16 BRPM

## 2023-11-14 PROCEDURE — 99215 OFFICE O/P EST HI 40 MIN: CPT

## 2023-11-14 RX ORDER — ENTECAVIR 0.5 MG/1
0.5 TABLET, FILM COATED ORAL
Qty: 90 | Refills: 3 | Status: ACTIVE | COMMUNITY
Start: 2023-05-10 | End: 1900-01-01

## 2023-11-15 ENCOUNTER — NON-APPOINTMENT (OUTPATIENT)
Age: 73
End: 2023-11-15

## 2023-11-15 LAB
AFP-TM SERPL-MCNC: 8.9 NG/ML
ALBUMIN SERPL ELPH-MCNC: 4.5 G/DL
ALP BLD-CCNC: 131 U/L
ALT SERPL-CCNC: 17 U/L
ANION GAP SERPL CALC-SCNC: 12 MMOL/L
AST SERPL-CCNC: 19 U/L
BILIRUB SERPL-MCNC: 0.6 MG/DL
BUN SERPL-MCNC: 14 MG/DL
CALCIUM SERPL-MCNC: 10.1 MG/DL
CANCER AG19-9 SERPL-ACNC: <2 U/ML
CHLORIDE SERPL-SCNC: 98 MMOL/L
CO2 SERPL-SCNC: 25 MMOL/L
CREAT SERPL-MCNC: 0.89 MG/DL
EGFR: 90 ML/MIN/1.73M2
GLUCOSE SERPL-MCNC: 145 MG/DL
HBV SURFACE AG SER QL: REACTIVE
HCT VFR BLD CALC: 44.1 %
HEPB DNA PCR INT: NOT DETECTED
HEPB DNA PCR LOG: NOT DETECTED LOGIU/ML
HGB BLD-MCNC: 14.1 G/DL
INR PPP: 0.98 RATIO
MCHC RBC-ENTMCNC: 27.1 PG
MCHC RBC-ENTMCNC: 32 GM/DL
MCV RBC AUTO: 84.8 FL
PLATELET # BLD AUTO: 161 K/UL
POTASSIUM SERPL-SCNC: 4.7 MMOL/L
PROT SERPL-MCNC: 7.9 G/DL
PT BLD: 11.1 SEC
RBC # BLD: 5.2 M/UL
RBC # FLD: 14 %
SODIUM SERPL-SCNC: 135 MMOL/L
WBC # FLD AUTO: 6.59 K/UL

## 2023-11-20 ENCOUNTER — NON-APPOINTMENT (OUTPATIENT)
Age: 73
End: 2023-11-20

## 2023-11-20 LAB — HDV AB SER IA-ACNC: NEGATIVE

## 2024-03-04 NOTE — DISCHARGE NOTE PROVIDER - NSDCACTIVITY_GEN_ALL_CORE
yes Stairs allowed/Walking - Indoors allowed/No heavy lifting/straining/Showering allowed/Do not make important decisions/Do not drive or operate machinery/Walking - Outdoors allowed

## 2024-03-20 ENCOUNTER — OUTPATIENT (OUTPATIENT)
Dept: OUTPATIENT SERVICES | Facility: HOSPITAL | Age: 74
LOS: 1 days | End: 2024-03-20
Payer: MEDICARE

## 2024-03-20 ENCOUNTER — APPOINTMENT (OUTPATIENT)
Dept: MRI IMAGING | Facility: CLINIC | Age: 74
End: 2024-03-20
Payer: MEDICARE

## 2024-03-20 DIAGNOSIS — Z98.890 OTHER SPECIFIED POSTPROCEDURAL STATES: Chronic | ICD-10-CM

## 2024-03-20 DIAGNOSIS — Z98.49 CATARACT EXTRACTION STATUS, UNSPECIFIED EYE: Chronic | ICD-10-CM

## 2024-03-20 DIAGNOSIS — Z95.1 PRESENCE OF AORTOCORONARY BYPASS GRAFT: Chronic | ICD-10-CM

## 2024-03-20 DIAGNOSIS — C22.9 MALIGNANT NEOPLASM OF LIVER, NOT SPECIFIED AS PRIMARY OR SECONDARY: ICD-10-CM

## 2024-03-20 PROCEDURE — 74183 MRI ABD W/O CNTR FLWD CNTR: CPT | Mod: 26,MH

## 2024-03-20 PROCEDURE — 74183 MRI ABD W/O CNTR FLWD CNTR: CPT

## 2024-03-20 PROCEDURE — A9585: CPT

## 2024-03-26 ENCOUNTER — LABORATORY RESULT (OUTPATIENT)
Age: 74
End: 2024-03-26

## 2024-03-26 ENCOUNTER — OUTPATIENT (OUTPATIENT)
Dept: OUTPATIENT SERVICES | Facility: HOSPITAL | Age: 74
LOS: 1 days | End: 2024-03-26
Payer: MEDICARE

## 2024-03-26 ENCOUNTER — APPOINTMENT (OUTPATIENT)
Dept: RADIOLOGY | Facility: IMAGING CENTER | Age: 74
End: 2024-03-26
Payer: MEDICARE

## 2024-03-26 ENCOUNTER — APPOINTMENT (OUTPATIENT)
Dept: HEPATOLOGY | Facility: CLINIC | Age: 74
End: 2024-03-26
Payer: MEDICARE

## 2024-03-26 VITALS
HEIGHT: 68 IN | DIASTOLIC BLOOD PRESSURE: 83 MMHG | WEIGHT: 207 LBS | TEMPERATURE: 98.3 F | HEART RATE: 69 BPM | RESPIRATION RATE: 16 BRPM | OXYGEN SATURATION: 97 % | SYSTOLIC BLOOD PRESSURE: 142 MMHG | BODY MASS INDEX: 31.37 KG/M2

## 2024-03-26 DIAGNOSIS — Z98.49 CATARACT EXTRACTION STATUS, UNSPECIFIED EYE: Chronic | ICD-10-CM

## 2024-03-26 DIAGNOSIS — Z95.1 PRESENCE OF AORTOCORONARY BYPASS GRAFT: Chronic | ICD-10-CM

## 2024-03-26 DIAGNOSIS — C22.9 MALIGNANT NEOPLASM OF LIVER, NOT SPECIFIED AS PRIMARY OR SECONDARY: ICD-10-CM

## 2024-03-26 DIAGNOSIS — Z98.890 OTHER SPECIFIED POSTPROCEDURAL STATES: Chronic | ICD-10-CM

## 2024-03-26 PROCEDURE — 71046 X-RAY EXAM CHEST 2 VIEWS: CPT | Mod: 26

## 2024-03-26 PROCEDURE — 71046 X-RAY EXAM CHEST 2 VIEWS: CPT

## 2024-03-26 PROCEDURE — 99215 OFFICE O/P EST HI 40 MIN: CPT

## 2024-03-26 NOTE — HISTORY OF PRESENT ILLNESS
[de-identified] : 75 y/o male with a PMH of CAD, T2DM, HLD, Obesity, NAFLD, Chronic HBV and HCC, here for follow-up. Accompanied by son;  has been having sub xyphiod cp with deep breath, not with activity, no radiation pattern; no change in activity; no heavy lifting; no orthopnea, has baseline DUBON after a few blocks; has f/u with cardiololgy 4/1 no cough; no fevers chill got back from [place] on 3/13; had MRI done 3/20 (not read yet) meds reviewed, no changes no etoh no sx of liver decompensation no GIB   previous note-  feels well, c/o constipation; no sx of liver decompensation seen by Dr. Devlin 11/9; wants to travel to [facility] from 11/29 to first week on March'24. MRI 10/31/23- PROCEDURE DATE: 10/31/2023    INTERPRETATION: CLINICAL INFORMATION: Hepatocellular carcinoma status post radioembolization to segments 2/3 on 9/18/2023 and radioembolization to a segments 7 mass 7/25/2023  COMPARISON: MRI 9/28/2023, 4/19/2023  CONTRAST/COMPLICATIONS: IV Contrast: Gadavist 9 cc administered 1 cc discarded Oral Contrast: NONE Complications: None reported at time of study completion  PROCEDURE: MRI of the abdomen was performed. MRCP was performed.  FINDINGS: LOWER CHEST: Sternotomy.  LIVER: Mild enlargement of the caudate lobe with medial segment atrophy. Status post interval radioembolization of a segment 2 lesion. Prior embolization of a segment 7 lesion:  Lesion #: 1 Location: Segment 7 Size: 2.4 cm treatment cavity (19:21), previously 2.3 cm. Surrounding posttreatment changes. Enhancement: Yes; Thin peripheral rim/Treatment specific expected enhancement. Washout: NO LI-RADS v 2018 Category: LR-TR Nonviable  Lesion #: 2 Location: Segment 2 Size: 1.4 cm (12:28), previously 2.0 cm. Enhancement: Yes; mild persistent internal enhancement decreased from prior. Washout: NO LI-RADS v 2018 Category: LR-TR Equivocal  An additional 1.5 cm observation anterior and inferior to the treated segment 2 lesion may represent adjacent posttreatment changes however imaging characteristics are somewhat concerning as this observation demonstrates T2 signal with arterial enhancement and washout. No lesion was noted at prior imaging in August 2023 in this region. This is indeterminate, attention on follow-up imaging is recommended.  BILE DUCTS: Normal caliber. GALLBLADDER: Within normal limits. SPLEEN: Within normal limits. PANCREAS: Within normal limits. ADRENALS: Within normal limits. KIDNEYS/URETERS: Within normal limits.  VISUALIZED PORTIONS: BOWEL: Within normal limits. PERITONEUM: No ascites. VESSELS: Atherosclerotic changes. SMV, portal and hepatic veins are patent. RETROPERITONEUM/LYMPH NODES: No lymphadenopathy. ABDOMINAL WALL: Within normal limits. BONES: Within normal limits.  IMPRESSION: Treated lesion in segment 7 without evidence of viability (LR-TR Nonviable).  Interval treatment of a segment 2 lesion which demonstrates decrease in size. Mild persistent internal enhancement (LR-TR Equivocal).  A new observation anterior and inferior to the treated lesion in segment 2 as above is indeterminate. Attention at follow-up imaging in 3 months is recommended.   Prior HPI: Pt established care with us after referral from Dr. Squires for evaluation of liver lesion c/f HCC. Patient with long standing hx of HBV and fatty liver (~ 20 yrs) -- HBV tx with 'antiviral' (cannot recall which) for many years and then about 2 yrs ago was advised to d/c by MD. Recently seen by Dr. Squires and started on Entecavir 0.5 mg/day. No episodes of liver decompensation. No prior liver bx. No known family hx of liver disease. Born in [place] and in [place] many years. Previously worked in construction and now retired. Lives at home with family. No current or hx of ETOH use. No IVDU. No blood transfusions. No OTC med/supps/herbals.  4/19/23 MRI Abd reviewed on 4/27/23 Hepatobiliary Tumor Board: liver with borderline morphology, no classic posterior notching or widened fissures however nodularity on posterior right lobe c/w early signs of cirrhosis. Lesion #1: Segment 7, 2.2 cm, LR 5 + Lesion #2: Segment 2, 2 cm, LR 5. Consensus recommendation to IR for LDT (Segment 7 lesion ablation/Y90 & Segment 2 lesion Y90). 5/4/23 CT Chest done and w/o c/f metastasis. Now s/p Y90 to Segment 7 on 7/25/23 and additional Y90 on 9/18/23 to Segment 2 lesion.

## 2024-03-26 NOTE — ASSESSMENT
[FreeTextEntry1] : 73 y/o male with a PMH of CAD, T2DM, HLD, Obesity, NAFLD, Chronic HBV and HCC, here for follow-up. Imaging and labs reviewed. Following issues were d/w pt in detail  HCC - Bilobar HCC - Lesion #1: Segment 7, 2.2 cm, LR 5 s/p Y90 on 7/25/23 - Lesion #2: Segment 2, 2 cm, LR 5 s/p Y90 on 9/18/23 - 5/4/23 CT Chest w/o c/f metastasis - 5/12/23 NM Bone Scan w/o metastasis - has f/u with Dr. Devlin tomorrow; will ask [facility]iology to read MRI done on 3/20 to read asap CXR for pleuritic CP; has f/u with cardiology 4/1 update labs including hbv labs- continue entecavir (renewed)  all questions answered, demonstrated understanding f/u 3 months  I spent total of 45 minutes on this patient encounter.

## 2024-03-26 NOTE — PHYSICAL EXAM
[General Appearance - Alert] : alert [Respiration, Rhythm And Depth] : normal respiratory rhythm and effort [Sclera] : the sclera and conjunctiva were normal [Auscultation Breath Sounds / Voice Sounds] : lungs were clear to auscultation bilaterally [Heart Rate And Rhythm] : heart rate was normal and rhythm regular [Bowel Sounds] : normal bowel sounds [Heart Sounds] : normal S1 and S2 [Abdomen Tenderness] : non-tender [Oriented To Time, Place, And Person] : oriented to person, place, and time [Impaired Insight] : insight and judgment were intact

## 2024-03-27 PROBLEM — R77.2 ELEVATED AFP: Status: ACTIVE | Noted: 2023-10-19

## 2024-03-28 ENCOUNTER — NON-APPOINTMENT (OUTPATIENT)
Age: 74
End: 2024-03-28

## 2024-03-29 ENCOUNTER — APPOINTMENT (OUTPATIENT)
Dept: INTERVENTIONAL RADIOLOGY/VASCULAR | Facility: CLINIC | Age: 74
End: 2024-03-29

## 2024-03-29 DIAGNOSIS — R77.2 ABNORMALITY OF ALPHAFETOPROTEIN: ICD-10-CM

## 2024-03-29 NOTE — HISTORY OF PRESENT ILLNESS
[0] : ~His/Her~ pain was 0 out of 10 [FreeTextEntry1] : Visit conducted as telehealth. Provider located at [facility]. Pt located at home. Consent obtained.  This is a 73 y/o male with pmhx of CAD s/p 4v CABG, T2DM, HTN, HBV, and new diagnosis of HCC who presents to IR today for follow up for liver directed therapy.   Pt had been following with Dr. Sj Squires for liver lesion concerning for HCC. HBV dx 20 + years, and fatty liver; was given 'antiviral' for many years and then 2 years ago was told by another MD to stop meds; recently was seen by Dr. Squires and was told to start entecavir. He was evaluated by Dr. Vieyra recently and his case was discussed at 4/27/23 [facility].  The consensus was to proceed with laparoscopic ablation vs segmental Y90.  He had initial evaluation in 6/2023 and underwent Y90 to segment 7 on 7/25/23 and Y90 to segment 2/3 on 9/18/23.  He presents today for follow up.   He reports to be feeling well. He states he has chronically had some fatigue but reports his energy level has been better recently. He has good appetite. Recently visited [facility] to see family.  He takes naps every day for about 30 minutes-1 hour. He has been intentionally losing weight on trulicity.  Reported 1 week of RUQ discomfort following procedure but no further abdominal pain since. He reports has had some pain under his ribs and recently had chest xray which was normal. He is scheduled to see his cardiologist on 4/1.  Denies abdominal pain, discoloration of the skin/eyes, abdominal distention, LE edema, confusion, hematochezia, melena, or n/v/d.   PMH- Cad, DM x 20, high chol PSH - CABG x 4 12/2019, 4 stents 2003, bilat cataracts FH - no liver dz SH- born in [place], in [place] many years, retired construction; lives with family (wife, 4 children), no tob, no etoh, no drugs, no tatoo, no blood transfusion; no herbals   [facility]: unsure name at time of consult Cards: Sharif Abreu Hep: Jarrell

## 2024-03-29 NOTE — REASON FOR VISIT
[Follow-Up: _____] : a [unfilled] follow-up visit [Home] : at home, [unfilled] , at the time of the visit. [Medical Office: ([facility])___] : at the medical office located in  [Family Member] : family member [FreeTextEntry1] : liver directed therapy

## 2024-03-29 NOTE — PHYSICAL EXAM
[Alert] : alert [No Acute Distress] : no acute distress [Well Nourished] : well nourished [Well Developed] : well developed [Healthy Appearance] : healthy appearance [Normal Voice/Communication] : normal voice communication [No Respiratory Distress] : no respiratory distress [Oriented x3] : oriented to person, place, and time [Normal Insight/Judgement] : insight and judgment were intact [Normal Affect] : the affect was normal [Normal Mood] : the mood was normal [Recent Memory Normal] : recent memory was not impaired [Remote Memory Normal] : remote memory was not impaired [Restricted in physically strenuous activity but ambulatory and able to carry out work of a light or sedentary nature] : Restricted in physically strenuous activity but ambulatory and able to carry out work of a light or sedentary nature, e.g., light house work, office work

## 2024-03-29 NOTE — ASSESSMENT
[Other: _____] : [unfilled] [FreeTextEntry1] : This is a 75 y/o male with pmhx of CAD s/p 4v CABG, T2DM, HTN, HBV, and new diagnosis of HCC who presents to IR  today for follow up for liver directed therapy.   1. HCC - hx of HBV, currently on Entacavir - s/p Y90 to segment 7 on 7/25/23 & segment 2/3 on 9/18/23 - 3/20/23 MRI demonstrated: > Treated lesions within segments 2 and 7 without evidence of viable tumor; LR-TR Nonviable. > New 5 mm hypervascular focus within the caudate with subtle DWI signal; LI-RADS 3. Follow-up MRI recommended in 3 months. - imaging was reviewed by Dr. Devlin and I discussed with the pt  - given optimal response to Y90 no further intervention recommended at this time, will continue to monitor LIRADS 3 lesion - 3/26 CMP reviewed Tbili 0.4 - f/u imaging in 3 months (end of June 2024) - f/u telehealth after imaging - continue to follow with hepatology   2. Elevated AFP - Most recent AFP WNL on 3/26- 4.7 - trending down - most recent was 10 on 10/26/23 - was 13.7 on 9/18/23 & 14.9 on 7/31 - continue to trend with hepatology team  I have provided the patient the opportunity to ask questions and have answered them to their satisfaction.  They are encouraged to contact our office with any further questions, concerns, or issues.  Above case and plan discussed with Dr. Devlin

## 2024-03-29 NOTE — ASSESSMENT
[Other: _____] : [unfilled] [FreeTextEntry1] : This is a 73 y/o male with pmhx of CAD s/p 4v CABG, T2DM, HTN, HBV, and new diagnosis of HCC who presents to IR  today for follow up for liver directed therapy.   1. HCC - hx of HBV, currently on Entacavir - s/p Y90 to segment 7 on 7/25/23 & segment 2/3 on 9/18/23 - 3/20/23 MRI demonstrated: > Treated lesions within segments 2 and 7 without evidence of viable tumor; LR-TR Nonviable. > New 5 mm hypervascular focus within the caudate with subtle DWI signal; LI-RADS 3. Follow-up MRI recommended in 3 months. - imaging was reviewed by Dr. Devlin and I discussed with the pt  - given optimal response to Y90 no further intervention recommended at this time, will continue to monitor LIRADS 3 lesion - 3/26 CMP reviewed Tbili 0.4 - f/u imaging in 3 months (end of June 2024) - f/u telehealth after imaging - continue to follow with hepatology   2. Elevated AFP - Most recent AFP WNL on 3/26- 4.7 - trending down - most recent was 10 on 10/26/23 - was 13.7 on 9/18/23 & 14.9 on 7/31 - continue to trend with hepatology team  I have provided the patient the opportunity to ask questions and have answered them to their satisfaction.  They are encouraged to contact our office with any further questions, concerns, or issues.  Above case and plan discussed with Dr. Devlin

## 2024-03-29 NOTE — HISTORY OF PRESENT ILLNESS
[0] : ~His/Her~ pain was 0 out of 10 [FreeTextEntry1] : Visit conducted as telehealth. Provider located at [facility]. Pt located at home. Consent obtained.  This is a 73 y/o male with pmhx of CAD s/p 4v CABG, T2DM, HTN, HBV, and new diagnosis of HCC who presents to IR today for follow up for liver directed therapy.   Pt had been following with Dr. Sj Squires for liver lesion concerning for HCC. HBV dx 20 + years, and fatty liver; was given 'antiviral' for many years and then 2 years ago was told by another MD to stop meds; recently was seen by Dr. Squires and was told to start entecavir. He was evaluated by Dr. Vieyra recently and his case was discussed at 4/27/23 [facility].  The consensus was to proceed with laparoscopic ablation vs segmental Y90.  He had initial evaluation in 6/2023 and underwent Y90 to segment 7 on 7/25/23 and Y90 to segment 2/3 on 9/18/23.  He presents today for follow up.   He reports to be feeling well. He states he has chronically had some fatigue but reports his energy level has been better recently. He has good appetite. Recently visited [facility] to see family.  He takes naps every day for about 30 minutes-1 hour. He has been intentionally losing weight on trulicity.  Reported 1 week of RUQ discomfort following procedure but no further abdominal pain since. He reports has had some pain under his ribs and recently had chest xray which was normal. He is scheduled to see his cardiologist on 4/1.  Denies abdominal pain, discoloration of the skin/eyes, abdominal distention, LE edema, confusion, hematochezia, melena, or n/v/d.   PMH- Cad, DM x 20, high chol PSH - CABG x 4 12/2019, 4 stents 2003, bilat cataracts FH - no liver dz SH- [place], [place], retired construction; lives with family (wife, 4 children), no tob, no etoh, no drugs, no tatoo, no blood transfusion; no herbals   P[facility]: Francis [facility], [facility]: unsure name at time of consult Cards: Sharif Abreu Hep: Jarrell

## 2024-03-29 NOTE — REASON FOR VISIT
[Follow-Up: _____] : a [unfilled] follow-up visit [Home] : at home, [unfilled] , at the time of the visit. [Medical Office: ([facility])___] : at the medical office located in  [Family Member] : family member [FreeTextEntry1] : liver directed therapy

## 2024-04-02 NOTE — ED ADULT NURSE NOTE - CARDIO WDL
- This is a 38 year old year old female scheduled for LEEP    - The patient's chronic medical problems are stable at this time.   - After review of all final results of all pre-op labs and diagnostic studies, the patient is optimized for the planned procedure.  - Important concerns to note are 
Normal rate, regular rhythm,

## 2024-04-14 ENCOUNTER — EMERGENCY (EMERGENCY)
Facility: HOSPITAL | Age: 74
LOS: 0 days | Discharge: HOME | End: 2024-04-14
Attending: STUDENT IN AN ORGANIZED HEALTH CARE EDUCATION/TRAINING PROGRAM
Payer: MEDICARE

## 2024-04-14 VITALS — WEIGHT: 205.03 LBS | HEIGHT: 69 IN

## 2024-04-14 VITALS
SYSTOLIC BLOOD PRESSURE: 146 MMHG | TEMPERATURE: 98 F | HEART RATE: 68 BPM | OXYGEN SATURATION: 99 % | RESPIRATION RATE: 16 BRPM | DIASTOLIC BLOOD PRESSURE: 62 MMHG

## 2024-04-14 DIAGNOSIS — Z91.018 ALLERGY TO OTHER FOODS: ICD-10-CM

## 2024-04-14 DIAGNOSIS — R33.9 RETENTION OF URINE, UNSPECIFIED: ICD-10-CM

## 2024-04-14 DIAGNOSIS — Z98.49 CATARACT EXTRACTION STATUS, UNSPECIFIED EYE: Chronic | ICD-10-CM

## 2024-04-14 DIAGNOSIS — Z98.890 OTHER SPECIFIED POSTPROCEDURAL STATES: Chronic | ICD-10-CM

## 2024-04-14 DIAGNOSIS — Z95.1 PRESENCE OF AORTOCORONARY BYPASS GRAFT: ICD-10-CM

## 2024-04-14 DIAGNOSIS — Z79.82 LONG TERM (CURRENT) USE OF ASPIRIN: ICD-10-CM

## 2024-04-14 DIAGNOSIS — E11.9 TYPE 2 DIABETES MELLITUS WITHOUT COMPLICATIONS: ICD-10-CM

## 2024-04-14 DIAGNOSIS — K92.1 MELENA: ICD-10-CM

## 2024-04-14 DIAGNOSIS — K21.9 GASTRO-ESOPHAGEAL REFLUX DISEASE WITHOUT ESOPHAGITIS: ICD-10-CM

## 2024-04-14 DIAGNOSIS — I70.90 UNSPECIFIED ATHEROSCLEROSIS: ICD-10-CM

## 2024-04-14 DIAGNOSIS — Z96.0 PRESENCE OF UROGENITAL IMPLANTS: ICD-10-CM

## 2024-04-14 DIAGNOSIS — M81.0 AGE-RELATED OSTEOPOROSIS WITHOUT CURRENT PATHOLOGICAL FRACTURE: ICD-10-CM

## 2024-04-14 DIAGNOSIS — Z95.5 PRESENCE OF CORONARY ANGIOPLASTY IMPLANT AND GRAFT: ICD-10-CM

## 2024-04-14 DIAGNOSIS — K59.00 CONSTIPATION, UNSPECIFIED: ICD-10-CM

## 2024-04-14 DIAGNOSIS — Z95.1 PRESENCE OF AORTOCORONARY BYPASS GRAFT: Chronic | ICD-10-CM

## 2024-04-14 DIAGNOSIS — Z91.014 ALLERGY TO MAMMALIAN MEATS: ICD-10-CM

## 2024-04-14 DIAGNOSIS — I10 ESSENTIAL (PRIMARY) HYPERTENSION: ICD-10-CM

## 2024-04-14 DIAGNOSIS — I25.10 ATHEROSCLEROTIC HEART DISEASE OF NATIVE CORONARY ARTERY WITHOUT ANGINA PECTORIS: ICD-10-CM

## 2024-04-14 DIAGNOSIS — E78.5 HYPERLIPIDEMIA, UNSPECIFIED: ICD-10-CM

## 2024-04-14 DIAGNOSIS — Z79.02 LONG TERM (CURRENT) USE OF ANTITHROMBOTICS/ANTIPLATELETS: ICD-10-CM

## 2024-04-14 LAB
ALBUMIN SERPL ELPH-MCNC: 3.8 G/DL — SIGNIFICANT CHANGE UP (ref 3.3–5)
ALP SERPL-CCNC: 117 U/L — SIGNIFICANT CHANGE UP (ref 40–120)
ALT FLD-CCNC: 19 U/L — SIGNIFICANT CHANGE UP (ref 12–78)
ANION GAP SERPL CALC-SCNC: 5 MMOL/L — SIGNIFICANT CHANGE UP (ref 5–17)
APPEARANCE UR: CLEAR — SIGNIFICANT CHANGE UP
APTT BLD: 32 SEC — SIGNIFICANT CHANGE UP (ref 24.5–35.6)
AST SERPL-CCNC: 18 U/L — SIGNIFICANT CHANGE UP (ref 15–37)
BASOPHILS # BLD AUTO: 0.05 K/UL — SIGNIFICANT CHANGE UP (ref 0–0.2)
BASOPHILS NFR BLD AUTO: 0.4 % — SIGNIFICANT CHANGE UP (ref 0–2)
BILIRUB SERPL-MCNC: 0.5 MG/DL — SIGNIFICANT CHANGE UP (ref 0.2–1.2)
BILIRUB UR-MCNC: NEGATIVE — SIGNIFICANT CHANGE UP
BUN SERPL-MCNC: 17 MG/DL — SIGNIFICANT CHANGE UP (ref 7–23)
CALCIUM SERPL-MCNC: 10 MG/DL — SIGNIFICANT CHANGE UP (ref 8.5–10.1)
CHLORIDE SERPL-SCNC: 108 MMOL/L — SIGNIFICANT CHANGE UP (ref 96–108)
CO2 SERPL-SCNC: 26 MMOL/L — SIGNIFICANT CHANGE UP (ref 22–31)
COLOR SPEC: YELLOW — SIGNIFICANT CHANGE UP
CREAT SERPL-MCNC: 1.19 MG/DL — SIGNIFICANT CHANGE UP (ref 0.5–1.3)
DIFF PNL FLD: ABNORMAL
EGFR: 64 ML/MIN/1.73M2 — SIGNIFICANT CHANGE UP
EOSINOPHIL # BLD AUTO: 0.23 K/UL — SIGNIFICANT CHANGE UP (ref 0–0.5)
EOSINOPHIL NFR BLD AUTO: 1.8 % — SIGNIFICANT CHANGE UP (ref 0–6)
GLUCOSE SERPL-MCNC: 151 MG/DL — HIGH (ref 70–99)
GLUCOSE UR QL: 250 MG/DL
HCT VFR BLD CALC: 46 % — SIGNIFICANT CHANGE UP (ref 39–50)
HGB BLD-MCNC: 14 G/DL — SIGNIFICANT CHANGE UP (ref 13–17)
IMM GRANULOCYTES NFR BLD AUTO: 0.3 % — SIGNIFICANT CHANGE UP (ref 0–0.9)
INR BLD: 0.94 RATIO — SIGNIFICANT CHANGE UP (ref 0.85–1.18)
KETONES UR-MCNC: NEGATIVE MG/DL — SIGNIFICANT CHANGE UP
LEUKOCYTE ESTERASE UR-ACNC: NEGATIVE — SIGNIFICANT CHANGE UP
LYMPHOCYTES # BLD AUTO: 1.37 K/UL — SIGNIFICANT CHANGE UP (ref 1–3.3)
LYMPHOCYTES # BLD AUTO: 10.8 % — LOW (ref 13–44)
MCHC RBC-ENTMCNC: 25.7 PG — LOW (ref 27–34)
MCHC RBC-ENTMCNC: 30.4 GM/DL — LOW (ref 32–36)
MCV RBC AUTO: 84.6 FL — SIGNIFICANT CHANGE UP (ref 80–100)
MONOCYTES # BLD AUTO: 0.68 K/UL — SIGNIFICANT CHANGE UP (ref 0–0.9)
MONOCYTES NFR BLD AUTO: 5.3 % — SIGNIFICANT CHANGE UP (ref 2–14)
NEUTROPHILS # BLD AUTO: 10.37 K/UL — HIGH (ref 1.8–7.4)
NEUTROPHILS NFR BLD AUTO: 81.4 % — HIGH (ref 43–77)
NITRITE UR-MCNC: NEGATIVE — SIGNIFICANT CHANGE UP
PH UR: 6 — SIGNIFICANT CHANGE UP (ref 5–8)
PLATELET # BLD AUTO: 185 K/UL — SIGNIFICANT CHANGE UP (ref 150–400)
POTASSIUM SERPL-MCNC: 4.8 MMOL/L — SIGNIFICANT CHANGE UP (ref 3.5–5.3)
POTASSIUM SERPL-SCNC: 4.8 MMOL/L — SIGNIFICANT CHANGE UP (ref 3.5–5.3)
PROT SERPL-MCNC: 8.7 GM/DL — HIGH (ref 6–8.3)
PROT UR-MCNC: NEGATIVE MG/DL — SIGNIFICANT CHANGE UP
PROTHROM AB SERPL-ACNC: 10.6 SEC — SIGNIFICANT CHANGE UP (ref 9.5–13)
RBC # BLD: 5.44 M/UL — SIGNIFICANT CHANGE UP (ref 4.2–5.8)
RBC # FLD: 14.6 % — HIGH (ref 10.3–14.5)
SODIUM SERPL-SCNC: 139 MMOL/L — SIGNIFICANT CHANGE UP (ref 135–145)
SP GR SPEC: 1.02 — SIGNIFICANT CHANGE UP (ref 1–1.03)
UROBILINOGEN FLD QL: 1 MG/DL — SIGNIFICANT CHANGE UP (ref 0.2–1)
WBC # BLD: 12.74 K/UL — HIGH (ref 3.8–10.5)
WBC # FLD AUTO: 12.74 K/UL — HIGH (ref 3.8–10.5)

## 2024-04-14 PROCEDURE — 71045 X-RAY EXAM CHEST 1 VIEW: CPT | Mod: 26

## 2024-04-14 PROCEDURE — 87086 URINE CULTURE/COLONY COUNT: CPT

## 2024-04-14 PROCEDURE — 86850 RBC ANTIBODY SCREEN: CPT

## 2024-04-14 PROCEDURE — 85610 PROTHROMBIN TIME: CPT

## 2024-04-14 PROCEDURE — 86900 BLOOD TYPING SEROLOGIC ABO: CPT

## 2024-04-14 PROCEDURE — 74177 CT ABD & PELVIS W/CONTRAST: CPT | Mod: MC

## 2024-04-14 PROCEDURE — 74177 CT ABD & PELVIS W/CONTRAST: CPT | Mod: 26,MC

## 2024-04-14 PROCEDURE — 71045 X-RAY EXAM CHEST 1 VIEW: CPT

## 2024-04-14 PROCEDURE — 80053 COMPREHEN METABOLIC PANEL: CPT

## 2024-04-14 PROCEDURE — 85730 THROMBOPLASTIN TIME PARTIAL: CPT

## 2024-04-14 PROCEDURE — 93005 ELECTROCARDIOGRAM TRACING: CPT

## 2024-04-14 PROCEDURE — 99285 EMERGENCY DEPT VISIT HI MDM: CPT

## 2024-04-14 PROCEDURE — 85025 COMPLETE CBC W/AUTO DIFF WBC: CPT

## 2024-04-14 PROCEDURE — 93010 ELECTROCARDIOGRAM REPORT: CPT

## 2024-04-14 PROCEDURE — 86901 BLOOD TYPING SEROLOGIC RH(D): CPT

## 2024-04-14 PROCEDURE — 99285 EMERGENCY DEPT VISIT HI MDM: CPT | Mod: 25

## 2024-04-14 PROCEDURE — 36415 COLL VENOUS BLD VENIPUNCTURE: CPT

## 2024-04-14 PROCEDURE — 81001 URINALYSIS AUTO W/SCOPE: CPT

## 2024-04-14 RX ORDER — SODIUM CHLORIDE 9 MG/ML
1000 INJECTION INTRAMUSCULAR; INTRAVENOUS; SUBCUTANEOUS ONCE
Refills: 0 | Status: COMPLETED | OUTPATIENT
Start: 2024-04-14 | End: 2024-04-14

## 2024-04-14 RX ADMIN — SODIUM CHLORIDE 1000 MILLILITER(S): 9 INJECTION INTRAMUSCULAR; INTRAVENOUS; SUBCUTANEOUS at 14:17

## 2024-04-14 NOTE — ED PROVIDER NOTE - PATIENT PORTAL LINK FT
You can access the FollowMyHealth Patient Portal offered by [facility] by registering at the following website: [email or web address]. By joining [employer]’s FollowMyHealth portal, you will also be able to view your health information using other applications (apps) compatible with our system.

## 2024-04-14 NOTE — ED ADULT NURSE NOTE - HISTORY OF COVID-19 VACCINATION
Called pt and she said yes she did take a xanax before the appt.   
Please ask if the patient took a xanax before her last appt with me. I believe that is what she told me. Visit was on 7/25 Thanks.   
Vaccine status unknown

## 2024-04-14 NOTE — ED ADULT TRIAGE NOTE - CHIEF COMPLAINT QUOTE
Patient presents to the ER with complaints of urinary retention, lower abdominal pain, constipation, and bloody stool. Patient states he takes aspirin and plavix. Denies chest pain, shortness of breath, dizziness, N/V.

## 2024-04-14 NOTE — ED PROVIDER NOTE - CLINICAL SUMMARY MEDICAL DECISION MAKING FREE TEXT BOX
Elderly male comes in w/ 4 days of constipation, also having intermittent bloody stools x4 weeks, came in today for urinary retention. Vitals stable, no fever. Abd exam benign. Plan for CT abd pelvis, will eval for SBO, UA, labs, and reassess. Elderly male comes in w/ 4 days of constipation, also having intermittent bloody stools x4 weeks, came in today for urinary retention. Vitals stable, no fever. Abd exam benign. Plan for CT abd pelvis, will eval for SBO, UA, labs, and reassess.    16:30, CC:  Signout received from Dr. Gillis to f/u Urinalysis (negative) & CT A/P (no SBO, report reviewed).  Pt tolerating Dyson catheter.  At this point, pt stable for DC home with Dyson indwelling, outpt f/u own  & GI ([facility]) MDs.

## 2024-04-14 NOTE — ED PROVIDER NOTE - CARE PROVIDER_API CALL
Khadijah Hurtado  Urology  [address] [place] [address]  Phone: [phone]  Fax: [phone]  Follow Up Time:

## 2024-04-14 NOTE — ED STATDOCS - PROGRESS NOTE DETAILS
Jorge Betancur   75 y/o M with PMHx of DM2, liver CA, osteoporosis, DJD, CAD s/p CABG x4, HTN, GERD, HLD, hepatitis B, arteriosclerosis, cardiac cath presents to the ED c/o urinary retention, lower abdominal pain, constipation, and bloody stool. Pt endorses that his bm are very small and contain blood. Pt states he takes 81mg ASA and Plavix. Denies chest pain, SOB, dizziness, nausea, vomiting. Pt is on medication for his liver CA. Will send to [facility] for further eval and treatment.

## 2024-04-14 NOTE — ED PROVIDER NOTE - CPE EDP SKIN NORM
Patient moved from CTC to Room 1 in ED at this time awaiting [facility] crisis room placement/TDO obtaining. normal...

## 2024-04-14 NOTE — ED PROVIDER NOTE - NSFOLLOWUPINSTRUCTIONS_ED_ALL_ED_FT
Keep urine catheter in until office follow up later this week with your own urologist or Dr. Hurtado (as listed below): call office tomorrow to expedite appointment.  Follow urine catheter care instructions as listed below.  Follow up with Dr. Squires, your GI doctor: call office tomorrow.      Acute Urinary Retention, Male    Acute urinary retention is when a person cannot pee (urinate) at all, or can only pee a little. This can come on all of a sudden. If it is not treated, it can lead to kidney problems or other serious problems.    What are the causes?  A problem with the tube that drains the bladder (urethra).  Problems with the nerves in the bladder.  Tumors.  Certain medicines.  An infection.  Having trouble pooping (constipation).  What increases the risk?  Older men are more at risk because their prostate gland may become larger as they age. Other conditions also can increase risk. These include:  Diseases, such as multiple sclerosis.  Injury to the spinal cord.  Diabetes.  A condition that affects the way the brain works, such as dementia.  Holding back urine due to trauma or because you do not want to use the bathroom.  What are the signs or symptoms?  Trouble peeing.  Pain in the lower belly.  How is this treated?  Treatment for this condition may include:  Medicines.  Placing a thin, germ-free tube (catheter) into the bladder to drain pee out of the body.  Therapy to treat mental health conditions.  Treatment for conditions that may cause this.  If needed, you may be treated in the hospital for kidney problems or to manage other problems.    Follow these instructions at home:  Medicines    Take over-the-counter and prescription medicines only as told by your doctor. Ask your doctor what medicines you should stay away from.  If you were given an antibiotic medicine, take it as told by your doctor. Do not stop taking it, even if you start to feel better.  General instructions    Do not smoke or use any products that contain nicotine or tobacco. If you need help quitting, ask your doctor.  Drink enough fluid to keep your pee pale yellow.  If you were sent home with a tube that drains the bladder, take care of it as told by your doctor.  Watch for changes in your symptoms. Tell your doctor about them.  If told, keep track of changes in your blood pressure at home. Tell your doctor about them.  Keep all follow-up visits.  Contact a doctor if:  You have spasms in your bladder that you cannot stop.  You leak pee when you have spasms.  Get help right away if:  You have chills or a fever.  You have blood in your pee.  You have a tube that drains pee from the bladder and these things happen:  The tube stops draining pee.  The tube falls out.  Summary  Acute urinary retention is when you cannot pee at all or you pee too little.  If this condition is not treated, it can lead to kidney problems or other serious problems.  If you were sent home with a tube (catheter) that drains the bladder, take care of it as told by your doctor.  Watch for changes in your symptoms. Tell your doctor about them.  This information is not intended to replace advice given to you by your health care provider. Make sure you discuss any questions you have with your health care provider.        Dyson Catheter Placement and Care    WHAT YOU NEED TO KNOW:    What is a Dyson catheter? A Dyson catheter is a sterile tube that is inserted into your bladder to drain urine. It is also called an indwelling urinary catheter. The tip of the catheter has a small balloon filled with solution that holds the catheter in your bladder.        How is a Dyson catheter placed? Your genital area will be cleaned to prevent infection. The catheter will be inserted into your urethra. When urine begins to flow into the tubing, the balloon is filled to keep the catheter in place. Then, the open end will be attached to a drainage bag.    How do I care for my catheter and drainage bag? You can reduce your risk for infection and injury by caring for your catheter and drainage bag properly.    Wash your hands often. Wash before and after you touch your catheter, tubing, or drainage bag. Use soap and water. Wear clean disposable gloves when you care for your catheter or disconnect the drainage bag. Wash your hands before you prepare or eat food.  Handwashing      Clean your genital area 2 times every day. Clean your catheter area and anal opening after every bowel movement.  For men: Use a soapy cloth to clean the tip of your penis. Start where the catheter enters. Wipe backward making sure to pull back the foreskin. Then use a cloth with clear water in the same direction to clean away the soap.    For women: Use a soapy cloth to clean the area that the catheter enters your body. Make sure to separate your labia and wipe toward the anus. Then use a cloth with clear water and wipe in the same direction.    Secure the catheter tube so you do not pull or move the catheter. This helps prevent pain and bladder spasms. Healthcare providers will show you how to use medical tape or a strap to secure the catheter tube to your body.    Keep a closed drainage system. Your catheter should always be attached to the drainage bag to form a closed system. Do not disconnect any part of the closed system unless you need to change the bag.    Keep the drainage bag below the level of your waist. This helps stop urine from moving back up the tubing and into your bladder. Do not loop or kink the tubing. This can cause urine to back up and collect in your bladder. Do not let the drainage bag touch or lie on the floor.    Empty the drainage bag when needed. The weight of a full drainage bag can be painful. Empty the drainage bag every 3 to 6 hours or when it is ? full.    Clean and change the drainage bag as directed. Ask your healthcare provider how often you should change the drainage bag and what cleaning solution to use. Wear disposable gloves when you change the bag. Do not allow the end of the catheter or tubing to touch anything. Clean the ends with an alcohol pad before you reconnect them.  What can I do if problems develop?    No urine is draining into the bag:  Check for kinks in the tubing and straighten them out.    Check the tape or strap used to secure the catheter tube to your skin. Make sure it is not blocking the tube.    Make sure you are not sitting or lying on the tubing.    Make sure the urine bag is hanging below the level of your waist.    Urine leaks from or around the catheter, tubing, or drainage bag: Check if the closed drainage system has accidently come open or apart. Clean the catheter and tubing ends with a new alcohol pad and reconnect them.  When should I seek immediate care?    Your catheter comes out.    You suddenly have material that looks like sand in the tubing or drainage bag.    No urine is draining into the bag and you have checked the system.    You have pain in your hip, back, pelvis, or lower abdomen.    You are confused or cannot think clearly.  When should I call my doctor?    You have a fever.    You have bladder spasms for more than 1 day after the catheter is placed.    You see blood in the tubing or drainage bag.    You have a rash or itching where the catheter tube is secured to your skin.    Urine leaks from or around the catheter, tubing, or drainage bag.    The closed drainage system has accidently come open or apart.    You see a layer of crystals inside the tubing.    You have questions or concerns about your condition or care.  CARE AGREEMENT:    You have the right to help plan your care. Learn about your health condition and how it may be treated. Discuss treatment options with your healthcare providers to decide what care you want to receive. You always have the right to refuse treatment.

## 2024-04-14 NOTE — ED ADULT NURSE NOTE - OBJECTIVE STATEMENT
presents to ed with complaints of constipation. patient states last BM 4 days ago, resulting in urinary retention since this am. patient has been on miralax x 7 days, mag citrate yesterday with no relief. states has not had constipation this bad in the past. denies fevers, denies other symptoms

## 2024-04-15 LAB
CULTURE RESULTS: NO GROWTH — SIGNIFICANT CHANGE UP
SPECIMEN SOURCE: SIGNIFICANT CHANGE UP

## 2024-04-18 ENCOUNTER — APPOINTMENT (OUTPATIENT)
Dept: UROLOGY | Facility: CLINIC | Age: 74
End: 2024-04-18

## 2024-05-10 ENCOUNTER — OUTPATIENT (OUTPATIENT)
Dept: OUTPATIENT SERVICES | Facility: HOSPITAL | Age: 74
LOS: 1 days | End: 2024-05-10
Payer: MEDICARE

## 2024-05-10 ENCOUNTER — APPOINTMENT (OUTPATIENT)
Dept: ULTRASOUND IMAGING | Facility: CLINIC | Age: 74
End: 2024-05-10
Payer: MEDICARE

## 2024-05-10 DIAGNOSIS — Z98.49 CATARACT EXTRACTION STATUS, UNSPECIFIED EYE: Chronic | ICD-10-CM

## 2024-05-10 DIAGNOSIS — Z95.1 PRESENCE OF AORTOCORONARY BYPASS GRAFT: Chronic | ICD-10-CM

## 2024-05-10 DIAGNOSIS — Z00.8 ENCOUNTER FOR OTHER GENERAL EXAMINATION: ICD-10-CM

## 2024-05-10 DIAGNOSIS — Z98.890 OTHER SPECIFIED POSTPROCEDURAL STATES: Chronic | ICD-10-CM

## 2024-05-10 PROCEDURE — 93970 EXTREMITY STUDY: CPT | Mod: 26

## 2024-05-10 PROCEDURE — 93970 EXTREMITY STUDY: CPT

## 2024-06-17 ENCOUNTER — APPOINTMENT (OUTPATIENT)
Dept: MRI IMAGING | Facility: CLINIC | Age: 74
End: 2024-06-17
Payer: MEDICARE

## 2024-06-17 ENCOUNTER — OUTPATIENT (OUTPATIENT)
Dept: OUTPATIENT SERVICES | Facility: HOSPITAL | Age: 74
LOS: 1 days | End: 2024-06-17
Payer: MEDICARE

## 2024-06-17 DIAGNOSIS — C22.9 MALIGNANT NEOPLASM OF LIVER, NOT SPECIFIED AS PRIMARY OR SECONDARY: ICD-10-CM

## 2024-06-17 DIAGNOSIS — Z98.890 OTHER SPECIFIED POSTPROCEDURAL STATES: Chronic | ICD-10-CM

## 2024-06-17 DIAGNOSIS — Z95.1 PRESENCE OF AORTOCORONARY BYPASS GRAFT: Chronic | ICD-10-CM

## 2024-06-17 DIAGNOSIS — Z98.49 CATARACT EXTRACTION STATUS, UNSPECIFIED EYE: Chronic | ICD-10-CM

## 2024-06-17 PROCEDURE — A9585: CPT

## 2024-06-17 PROCEDURE — 74183 MRI ABD W/O CNTR FLWD CNTR: CPT | Mod: 26,MH

## 2024-06-17 PROCEDURE — 74183 MRI ABD W/O CNTR FLWD CNTR: CPT

## 2024-06-25 ENCOUNTER — APPOINTMENT (OUTPATIENT)
Dept: HEPATOLOGY | Facility: CLINIC | Age: 74
End: 2024-06-25
Payer: MEDICARE

## 2024-06-25 VITALS
HEART RATE: 83 BPM | OXYGEN SATURATION: 99 % | HEIGHT: 68 IN | TEMPERATURE: 97.2 F | DIASTOLIC BLOOD PRESSURE: 72 MMHG | RESPIRATION RATE: 16 BRPM | WEIGHT: 204 LBS | SYSTOLIC BLOOD PRESSURE: 114 MMHG | BODY MASS INDEX: 30.92 KG/M2

## 2024-06-25 DIAGNOSIS — B18.1 CHRONIC VIRAL HEPATITIS B W/OUT DELTA-AGENT: ICD-10-CM

## 2024-06-25 PROCEDURE — 99214 OFFICE O/P EST MOD 30 MIN: CPT

## 2024-06-27 ENCOUNTER — NON-APPOINTMENT (OUTPATIENT)
Age: 74
End: 2024-06-27

## 2024-06-28 ENCOUNTER — APPOINTMENT (OUTPATIENT)
Dept: INTERVENTIONAL RADIOLOGY/VASCULAR | Facility: CLINIC | Age: 74
End: 2024-06-28

## 2024-06-28 DIAGNOSIS — C22.9 MALIGNANT NEOPLASM OF LIVER, NOT SPECIFIED AS PRIMARY OR SECONDARY: ICD-10-CM

## 2024-07-03 NOTE — H&P ADULT - HISTORY OF PRESENT ILLNESS
Detail Level: Zone Render Risk Assessment In Note?: no 70 y/o M PMHx CAD s/p stents x4 (last cath 2013), GERD, HLD, DM.  Initially presented to [facility] ED with complaint of chest pain radiating down his right arm since Monday.   States pain usually lasts for about 15 minutes and subsides on its own. Patient denies any aggravating factors. States last night pain became severe and was improved after taking nitroglycerin so he came to ED for further evaluation. Patient states pain is located in the middle of his chest and sometimes will radiate to R arm.  Patient denies any SOB, but does admit to SOB on exertion for the past few weeks. Denies any abd pain, N/V/D/C.    In ED, patient received ASA 325mg x1, CXR done negative for acute cardiopulmonary process. Patient labs significant for elevation in troponin- 0.155-->0.305-->0.384. EKG showing NSR @ 79bpm Recommendation Preamble: The following recommendations were made during the visit: referred to  for removal 70 y/o M PMHx CAD s/p stents x4 (last cath 2013), GERD, HLD, DM.  Initially presented to [facility] ED with complaint of chest pain radiating down his right arm since Monday.   States pain usually lasts for about 15 minutes and subsides on its own. Patient denies any aggravating factors. Patient states pain is located in the middle of his chest and sometimes will radiate to R arm.  Patient denies any SOB, but does admit to SOB on exertion for the past few weeks. Denies any abd pain, N/V/D/C.    In ED, patient received ASA 325mg x1, CXR done negative for acute cardiopulmonary process. Patient labs significant for elevation in troponin- 0.155-->0.305-->0.384. EKG showing NSR @ 79bpm     He underwent cardiac cath at [facility] and was found to have triple vessel CAD.  Per the patient, he was given Plavix at [place] as well at some point.  He was transferred to [facility] for surgical evaluation.    Currently sitting up in bed.  Denies CP or SOB.  NAD noted.  Family at bedside.  VSS.

## 2024-07-11 ENCOUNTER — OUTPATIENT (OUTPATIENT)
Dept: OUTPATIENT SERVICES | Facility: HOSPITAL | Age: 74
LOS: 1 days | Discharge: HOME | End: 2024-07-11

## 2024-07-11 DIAGNOSIS — C22.0 LIVER CELL CARCINOMA: ICD-10-CM

## 2024-07-15 ENCOUNTER — APPOINTMENT (OUTPATIENT)
Dept: HEMATOLOGY ONCOLOGY | Facility: CLINIC | Age: 74
End: 2024-07-15
Payer: MEDICARE

## 2024-07-15 VITALS
HEART RATE: 76 BPM | HEIGHT: 69.29 IN | SYSTOLIC BLOOD PRESSURE: 144 MMHG | RESPIRATION RATE: 16 BRPM | BODY MASS INDEX: 30.02 KG/M2 | DIASTOLIC BLOOD PRESSURE: 74 MMHG | WEIGHT: 205.03 LBS | TEMPERATURE: 98.3 F | OXYGEN SATURATION: 98 %

## 2024-07-15 DIAGNOSIS — B18.1 CHRONIC VIRAL HEPATITIS B W/OUT DELTA-AGENT: ICD-10-CM

## 2024-07-15 DIAGNOSIS — C22.9 MALIGNANT NEOPLASM OF LIVER, NOT SPECIFIED AS PRIMARY OR SECONDARY: ICD-10-CM

## 2024-07-15 PROCEDURE — G2211 COMPLEX E/M VISIT ADD ON: CPT

## 2024-07-15 PROCEDURE — 99205 OFFICE O/P NEW HI 60 MIN: CPT

## 2024-07-15 RX ORDER — CHROMIUM 200 MCG
10 MCG TABLET ORAL
Refills: 0 | Status: ACTIVE | COMMUNITY
Start: 2024-07-15

## 2024-07-15 RX ORDER — TAMSULOSIN HYDROCHLORIDE 0.4 MG/1
0.4 CAPSULE ORAL
Refills: 0 | Status: ACTIVE | COMMUNITY
Start: 2024-07-15

## 2024-07-15 RX ORDER — PNV NO.95/FERROUS FUM/FOLIC AC 28MG-0.8MG
100 TABLET ORAL
Refills: 0 | Status: ACTIVE | COMMUNITY
Start: 2024-07-15

## 2024-07-16 ENCOUNTER — APPOINTMENT (OUTPATIENT)
Dept: CT IMAGING | Facility: CLINIC | Age: 74
End: 2024-07-16
Payer: MEDICARE

## 2024-07-16 ENCOUNTER — OUTPATIENT (OUTPATIENT)
Dept: OUTPATIENT SERVICES | Facility: HOSPITAL | Age: 74
LOS: 1 days | End: 2024-07-16
Payer: MEDICARE

## 2024-07-16 DIAGNOSIS — Z98.49 CATARACT EXTRACTION STATUS, UNSPECIFIED EYE: Chronic | ICD-10-CM

## 2024-07-16 DIAGNOSIS — Z98.890 OTHER SPECIFIED POSTPROCEDURAL STATES: Chronic | ICD-10-CM

## 2024-07-16 DIAGNOSIS — Z95.1 PRESENCE OF AORTOCORONARY BYPASS GRAFT: Chronic | ICD-10-CM

## 2024-07-16 DIAGNOSIS — C22.9 MALIGNANT NEOPLASM OF LIVER, NOT SPECIFIED AS PRIMARY OR SECONDARY: ICD-10-CM

## 2024-07-16 PROCEDURE — 71260 CT THORAX DX C+: CPT | Mod: 26,MH

## 2024-07-16 PROCEDURE — 71260 CT THORAX DX C+: CPT

## 2024-07-16 PROCEDURE — 74177 CT ABD & PELVIS W/CONTRAST: CPT | Mod: 26,MH

## 2024-07-16 PROCEDURE — 74177 CT ABD & PELVIS W/CONTRAST: CPT

## 2024-07-22 ENCOUNTER — APPOINTMENT (OUTPATIENT)
Dept: INFUSION THERAPY | Facility: HOSPITAL | Age: 74
End: 2024-07-22

## 2024-07-22 ENCOUNTER — NON-APPOINTMENT (OUTPATIENT)
Age: 74
End: 2024-07-22

## 2024-07-22 ENCOUNTER — RESULT REVIEW (OUTPATIENT)
Age: 74
End: 2024-07-22

## 2024-07-23 DIAGNOSIS — Z51.11 ENCOUNTER FOR ANTINEOPLASTIC CHEMOTHERAPY: ICD-10-CM

## 2024-08-06 ENCOUNTER — APPOINTMENT (OUTPATIENT)
Dept: HEMATOLOGY ONCOLOGY | Facility: CLINIC | Age: 74
End: 2024-08-06

## 2024-08-06 PROCEDURE — 99213 OFFICE O/P EST LOW 20 MIN: CPT

## 2024-08-06 NOTE — HISTORY OF PRESENT ILLNESS
[Disease: _____________________] : Disease: [unfilled] [AJCC Stage: ____] : AJCC Stage: [unfilled] [Therapy: ___] : Therapy: [unfilled] [Cycle: ___] : Cycle: [unfilled] [Day: ___] : Day: [unfilled] [Date: ____________] : Patient's last distress assessment performed on [unfilled]. 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   4/19/23: MR Ab: liver with borderline morphology, no classic posterior notching or widened fissures however nodularity on posterior right lobe c/w early signs of cirrhosis. Lesion #1: Segment 7, 2.2 cm, LR 5 + Lesion #2: Segment 2, 2 cm, LR 5. Consensus recommendation to IR for LDT (Segment 7 lesion ablation/Y90 & Segment 2 lesion Y90). 5/4/23 CT Chest done and w/o c/f metastasis. Now s/p Y90 to Segment 7 on 7/25/23 and additional Y90 on 9/18/23 to Segment 2 lesion. 5/4/23: CT chest: IGNACIA  5/12/23: Bone scan: IGNACIA 6/27/23: EGD negative for varices  7/25/23: Y90 segment 7  9/18/23: Y90 segment 2/3 10/31/23: MR abd: treated lesion seg 7 LR-TR nonviable. Interval treatment of seg 2 lesion demonstrates decrease in size, mild persistent enhancement. A new observation anterior and inferior to treated lesion in seg 2 indeterminate 3/20/24: MR abd: treated lesions w/in seg 2 and 7 w/o evidence of viable tumor. New 5 mm hypervascular focus within the caudate w/ subtle DWI signal LR3 4/14/24: CT AP: diffuse colonic distension w/ stool.  6/17/24: MR Abd: interval progression multifocal HCC w/ new LR4 and LR6 observations. Treatment cavity of segment 7 with an enlarging peripheral nodule; LR-TR Viable. Markedly heterogeneous treatment zone of segment 2; LR-TR Equivocal, though suspicious for possible viable tumor.  Patient's case was discussed during hepatobiliary tumor board and patient was referred to medical oncology with plan for systemic therapy. Per IR, not able to obtain biopsy due to location and size of the lesions.   Social Hx: He is . He has 4 children- 2 sons and 2 daughters. He lives with his wife and his son, DIL and their 3 children. He has 9 grandchildren.  He denies tobacco or alcohol use. He is retired from working as a contractor. He is from [place], moved here 55 years ago.   7/16/24: CT CAP: multifocal HCC w/ slight increase in size of a few of the lsions  7/22/24: C1 Durvalumab/tremelimumab   [de-identified] : n/a [de-identified] : AFP 4.7 (3/28/2024) [de-identified] : Hepatitis B PCR - not detected 11/15/23 [de-identified] : Here w/ his son for clinical follow up  Tolerated treatment well No reportable AEs  Energy is low, however, present prior to treatment. No change after treatment.

## 2024-08-06 NOTE — ASSESSMENT
[Future Reassessment of Pain Scale] : Future reassessment of pain scale    [Palliative] : Goals of care discussed with patient: Palliative [Palliative Care Plan] : not applicable at this time [FreeTextEntry1] : Patient seen and discussed with Dr. Mary Jo Bettencourt.

## 2024-08-13 ENCOUNTER — NON-APPOINTMENT (OUTPATIENT)
Age: 74
End: 2024-08-13

## 2024-08-14 ENCOUNTER — APPOINTMENT (OUTPATIENT)
Dept: OTOLARYNGOLOGY | Facility: CLINIC | Age: 74
End: 2024-08-14
Payer: MEDICARE

## 2024-08-14 VITALS — BODY MASS INDEX: 31.52 KG/M2 | HEIGHT: 68 IN | WEIGHT: 208 LBS

## 2024-08-14 DIAGNOSIS — H69.93 UNSPECIFIED EUSTACHIAN TUBE DISORDER, BILATERAL: ICD-10-CM

## 2024-08-14 DIAGNOSIS — H92.11 OTORRHEA, RIGHT EAR: ICD-10-CM

## 2024-08-14 DIAGNOSIS — H74.02 TYMPANOSCLEROSIS, LEFT EAR: ICD-10-CM

## 2024-08-14 DIAGNOSIS — H65.23 CHRONIC SEROUS OTITIS MEDIA, BILATERAL: ICD-10-CM

## 2024-08-14 PROCEDURE — 99214 OFFICE O/P EST MOD 30 MIN: CPT | Mod: 25

## 2024-08-14 PROCEDURE — 92504 EAR MICROSCOPY EXAMINATION: CPT

## 2024-08-14 RX ORDER — OFLOXACIN OTIC 3 MG/ML
0.3 SOLUTION AURICULAR (OTIC) TWICE DAILY
Qty: 1 | Refills: 1 | Status: ACTIVE | COMMUNITY
Start: 2024-08-14 | End: 1900-01-01

## 2024-08-14 NOTE — PROCEDURE
[Risk and Benefits Discussed] : The purpose, risks, discomforts, benefits and alternatives of the procedure have been explained to the patient including no treatment. [Same] : same as the Pre Op Dx. [] : Binocular Microscopy [FreeTextEntry1] : bleeding from right ear  [FreeTextEntry4] : NONE  [FreeTextEntry6] : bilat impacted cerumen - left ear retdracted - ? inf perf vs atrophic inf tm and also tympanosclerosis; right ear with sl drainage - cerumen- tube in cerumen and partly visualized.

## 2024-08-14 NOTE — ASSESSMENT
[FreeTextEntry1] : Patient with 2 day hx of bleeding from right ear - dry blood and cerumenm removed right ear.  right tube in cerumen - can not tell functioning.  Sl otorhea - recommended dry ear precaution and started on ofloxacin drops - followup 2 weeks and do audio when clear Left ear cerume as well - removed - has atrophic area vx tm perf left ear with surrounding tympanosclerosis

## 2024-08-14 NOTE — HISTORY OF PRESENT ILLNESS
[de-identified] : bleeding from right ear.  Does use q tips .  Also has hx of liver cancer - on anticoag. Has noted dry blood past 2 days .  Did have tube placed one year ago by Dr Montes in right ear.

## 2024-08-14 NOTE — REVIEW OF SYSTEMS
[Hearing Loss] : hearing loss [Ear Drainage] : ear drainage [Negative] : Heme/Lymph [de-identified] : right ear issues

## 2024-08-19 ENCOUNTER — APPOINTMENT (OUTPATIENT)
Dept: INFUSION THERAPY | Facility: HOSPITAL | Age: 74
End: 2024-08-19

## 2024-08-19 ENCOUNTER — RESULT REVIEW (OUTPATIENT)
Age: 74
End: 2024-08-19

## 2024-08-19 DIAGNOSIS — D50.9 IRON DEFICIENCY ANEMIA, UNSPECIFIED: ICD-10-CM

## 2024-08-19 LAB
ALBUMIN SERPL ELPH-MCNC: 3.9 G/DL — SIGNIFICANT CHANGE UP (ref 3.3–5)
ALP SERPL-CCNC: 103 U/L — SIGNIFICANT CHANGE UP (ref 40–120)
ALT FLD-CCNC: 11 U/L — SIGNIFICANT CHANGE UP (ref 10–45)
ANION GAP SERPL CALC-SCNC: 12 MMOL/L — SIGNIFICANT CHANGE UP (ref 5–17)
AST SERPL-CCNC: 23 U/L — SIGNIFICANT CHANGE UP (ref 10–40)
BASOPHILS # BLD AUTO: 0.04 K/UL — SIGNIFICANT CHANGE UP (ref 0–0.2)
BASOPHILS NFR BLD AUTO: 0.5 % — SIGNIFICANT CHANGE UP (ref 0–2)
BILIRUB SERPL-MCNC: 0.5 MG/DL — SIGNIFICANT CHANGE UP (ref 0.2–1.2)
BUN SERPL-MCNC: 14 MG/DL — SIGNIFICANT CHANGE UP (ref 7–23)
CALCIUM SERPL-MCNC: 9.9 MG/DL — SIGNIFICANT CHANGE UP (ref 8.4–10.5)
CHLORIDE SERPL-SCNC: 100 MMOL/L — SIGNIFICANT CHANGE UP (ref 96–108)
CO2 SERPL-SCNC: 24 MMOL/L — SIGNIFICANT CHANGE UP (ref 22–31)
CREAT SERPL-MCNC: 0.97 MG/DL — SIGNIFICANT CHANGE UP (ref 0.5–1.3)
EGFR: 82 ML/MIN/1.73M2 — SIGNIFICANT CHANGE UP
EOSINOPHIL # BLD AUTO: 0.27 K/UL — SIGNIFICANT CHANGE UP (ref 0–0.5)
EOSINOPHIL NFR BLD AUTO: 3.7 % — SIGNIFICANT CHANGE UP (ref 0–6)
GLUCOSE SERPL-MCNC: 177 MG/DL — HIGH (ref 70–99)
HCT VFR BLD CALC: 37 % — LOW (ref 39–50)
HGB BLD-MCNC: 10.9 G/DL — LOW (ref 13–17)
IMM GRANULOCYTES NFR BLD AUTO: 0.4 % — SIGNIFICANT CHANGE UP (ref 0–0.9)
LYMPHOCYTES # BLD AUTO: 1.07 K/UL — SIGNIFICANT CHANGE UP (ref 1–3.3)
LYMPHOCYTES # BLD AUTO: 14.7 % — SIGNIFICANT CHANGE UP (ref 13–44)
MCHC RBC-ENTMCNC: 21.5 PG — LOW (ref 27–34)
MCHC RBC-ENTMCNC: 29.5 G/DL — LOW (ref 32–36)
MCV RBC AUTO: 73.1 FL — LOW (ref 80–100)
MONOCYTES # BLD AUTO: 0.72 K/UL — SIGNIFICANT CHANGE UP (ref 0–0.9)
MONOCYTES NFR BLD AUTO: 9.9 % — SIGNIFICANT CHANGE UP (ref 2–14)
NEUTROPHILS # BLD AUTO: 5.16 K/UL — SIGNIFICANT CHANGE UP (ref 1.8–7.4)
NEUTROPHILS NFR BLD AUTO: 70.8 % — SIGNIFICANT CHANGE UP (ref 43–77)
PLATELET # BLD AUTO: 183 K/UL — SIGNIFICANT CHANGE UP (ref 150–400)
POTASSIUM SERPL-MCNC: 4.4 MMOL/L — SIGNIFICANT CHANGE UP (ref 3.5–5.3)
POTASSIUM SERPL-SCNC: 4.4 MMOL/L — SIGNIFICANT CHANGE UP (ref 3.5–5.3)
PROT SERPL-MCNC: 8 G/DL — SIGNIFICANT CHANGE UP (ref 6–8.3)
RBC # BLD: 5.06 M/UL — SIGNIFICANT CHANGE UP (ref 4.2–5.8)
RBC # FLD: 17.8 % — HIGH (ref 10.3–14.5)
SODIUM SERPL-SCNC: 136 MMOL/L — SIGNIFICANT CHANGE UP (ref 135–145)
WBC # BLD: 7.29 K/UL — SIGNIFICANT CHANGE UP (ref 3.8–10.5)
WBC # FLD AUTO: 7.29 K/UL — SIGNIFICANT CHANGE UP (ref 3.8–10.5)

## 2024-08-20 LAB
FERRITIN SERPL-MCNC: 42 NG/ML
IRON SATN MFR SERPL: 8 %
IRON SERPL-MCNC: 38 UG/DL
TIBC SERPL-MCNC: 455 UG/DL
TSH SERPL-MCNC: 1.4 UIU/ML — SIGNIFICANT CHANGE UP (ref 0.27–4.2)
UIBC SERPL-MCNC: 417 UG/DL

## 2024-08-27 ENCOUNTER — APPOINTMENT (OUTPATIENT)
Dept: HEPATOLOGY | Facility: CLINIC | Age: 74
End: 2024-08-27
Payer: MEDICARE

## 2024-08-27 ENCOUNTER — APPOINTMENT (OUTPATIENT)
Dept: OTOLARYNGOLOGY | Facility: CLINIC | Age: 74
End: 2024-08-27
Payer: MEDICARE

## 2024-08-27 DIAGNOSIS — B18.1 CHRONIC VIRAL HEPATITIS B W/OUT DELTA-AGENT: ICD-10-CM

## 2024-08-27 DIAGNOSIS — H65.23 CHRONIC SEROUS OTITIS MEDIA, BILATERAL: ICD-10-CM

## 2024-08-27 DIAGNOSIS — H92.11 OTORRHEA, RIGHT EAR: ICD-10-CM

## 2024-08-27 DIAGNOSIS — C22.9 MALIGNANT NEOPLASM OF LIVER, NOT SPECIFIED AS PRIMARY OR SECONDARY: ICD-10-CM

## 2024-08-27 PROCEDURE — 99214 OFFICE O/P EST MOD 30 MIN: CPT

## 2024-08-27 PROCEDURE — 92557 COMPREHENSIVE HEARING TEST: CPT

## 2024-08-27 PROCEDURE — 92567 TYMPANOMETRY: CPT

## 2024-08-27 PROCEDURE — 99214 OFFICE O/P EST MOD 30 MIN: CPT | Mod: 25

## 2024-08-27 PROCEDURE — 92504 EAR MICROSCOPY EXAMINATION: CPT

## 2024-08-27 NOTE — REASON FOR VISIT
[Subsequent Evaluation] : a subsequent evaluation for [FreeTextEntry2] : f/u for Right ear bleeding, hearing loss, and tinnitus.

## 2024-08-27 NOTE — PROCEDURE
[Risk and Benefits Discussed] : The purpose, risks, discomforts, benefits and alternatives of the procedure have been explained to the patient including no treatment. [Same] : same as the Pre Op Dx. [] : Binocular Microscopy [FreeTextEntry1] : right ear infection - hx of tube placed several years ago  [FreeTextEntry4] : NONE [FreeTextEntry6] : right ear suctione under scope - debris removed around tube - tube extruding

## 2024-08-27 NOTE — HISTORY OF PRESENT ILLNESS
[de-identified] : 74 year male presents for f/u for Right ear bleeding, hearing loss, and tinnitus. As per patient, no bleeding since LCV. No usage of Q-tips since LCV. Still using ear drops prescribed. History of liver cancer - on anticoagulants- Plavix and Aspirin. History of Right ear tube placement one year ago by Dr Montes. Patient states constant Right hearing loss and tinnitus "as soon as they left" LCV. Patient states they have history of tinnitus but it has gotten worse. States he cannot understand when people are talking next to him and voices sound muffled. Patient states they have done auditory test in past.  Patient denies otalgia, otorrhea, ear infections, current bleeding, ear surgeries, dizziness, vertigo, headaches related to hearing.

## 2024-08-27 NOTE — PHYSICAL EXAM
[General Appearance - Alert] : alert [Sclera] : the sclera and conjunctiva were normal [Neck Appearance] : the appearance of the neck was normal [Respiration, Rhythm And Depth] : normal respiratory rhythm and effort [Auscultation Breath Sounds / Voice Sounds] : lungs were clear to auscultation bilaterally [Heart Rate And Rhythm] : heart rate was normal and rhythm regular [Heart Sounds] : normal S1 and S2 [Bowel Sounds] : normal bowel sounds [Abdomen Soft] : soft [Abdomen Tenderness] : non-tender [Oriented To Time, Place, And Person] : oriented to person, place, and time [Impaired Insight] : insight and judgment were intact [Affect] : the affect was normal

## 2024-08-27 NOTE — ASSESSMENT
[FreeTextEntry1] : 73 y/o male with a PMH of CAD, T2DM, HLD, Obesity, NAFLD, Chronic HBV and HCC, here for follow-up. Imaging and labs reviewed. Following issues were d/w pt in detail  HCC - Bilobar HCC - Lesion #1: Segment 7, 2.2 cm, LR 5 s/p Y90 on 7/25/23 - Lesion #2: Segment 2, 2 cm, LR 5 s/p Y90 on 9/18/23 - 5/4/23 CT Chest w/o c/f metastasis - 5/12/23 NM Bone Scan w/o metastasis currently on systemic rx; stable f/u CT next month DM optimization - will f/u with endocrine update  hbv labs- continue entecavir (renewed)  all questions answered, demonstrated understanding f/u end of October'24  I spent total of 35 minutes on this patient encounter.

## 2024-08-27 NOTE — PHYSICAL EXAM
[de-identified] : retracted left tm - see micro right tm  [Midline] : trachea located in midline position [Normal] : no rashes

## 2024-08-27 NOTE — HISTORY OF PRESENT ILLNESS
[de-identified] : 75 y/o male with a PMH of CAD, T2DM, HLD, Obesity, NAFLD, Chronic HBV and HCC, here for follow-up. Accompanied by wife. labs form 8/19 reviewed tbili 0.5 HBV pcr neg in march'24 7/22/24 on now on durvalumab and tremelimumab - tolerating well - F/b Dr. Mary Jo Johnson on hold - oncology requesting bx however unfortunately unable to obtain due to size/location of lesions -Dr Devlin FS elevated Hgba1c over 8 as per pt; eating more; had f/u with endocrine CT july 22, 2024- IMPRESSION: Multifocal hepatocellular carcinoma as detailed above with slight increase in size of a few of the lesions and 6/17/2024.  No evidence of metastatic disease within the chest, abdomen, or pelvis.  6 /27/24-  [facility] TAPIA was discussed during the 06/27/2024 Hepatobiliary Tumor Board.   Diagnosis: 2 HCC's s/p Y-90 + 3 new LR-5, 1 LR-4 Question to be addressed: new lesions, confirming HCC and need for locoregional and systemic rx  HPI/ PMHx: 75 y/o male with a PMH of CAD, T2DM, HLD, Obesity, NAFLD, Chronic HBV and HCC, s/p Y90 to segment 7 on 7/25/23 and Y90 to segment 2/3 on 9/18/23. Compensated, excellent functional status.  All Treatment (if applicable): -Y-90 to segment 7 on 7/25/23 -Y-90 to segment 2/3 on 9/18/23  3/26/2024 Blood work: pending Platelet 154, INR 0.98, Ast/Alt/Alp 19/18/145, T.Bili 0.4, Albumin 4.3, Bun/Cr 15/0.99, Na 135, Markers: AFP 4.7 (max 14.9 on 7/31/23)   ************************************************** Requested imaging to review?  6/17/2024 MR ABDOMEN WAW IC - New and enlarging hypervascular observations as follows: *New 1.0 cm hypervascular observation within segment 7 with washout and diffusion restriction (14, 20); LR 5 *New 1.0 cm hypervascular observation within segment 5 with washout and diffusion restriction (14, 45); LR 5. *1.1 cm hypervascular observation within the caudate with washout and diffusion restriction, previously 0.5 cm; LR 5. *New 0.7 cm hypervascular observation within segment 6/7 with washout and diffusion restriction (14, 32); LR 4 IMPRESSION: * Interval progression multifocal HCC since 3/20/2024, with new LR4-and LR5 observations as detailed above. * Treatment cavity of segment 7 with an enlarging peripheral nodule; LR-TR Viable. * Markedly heterogeneous treatment zone of segment 2; LR-TR Equivocal, though suspicious for possible viable tumor.    ************************************************** RADIOLOGY READING - The following Imaging was reviewed by DR. PAVEL PICKARD:  3/20/24 MRI - Reviewed/ compared.  6/17/24 MRI - We can see the treatment cavity for Y-90. There is a 9 mm hypervascular nodule in the periphery of it. It was was 6 mm. It has washout. Called it Viable. -More sites of disease: ---Adjacent to the treatment cavity, there is a hypervascular lesion. It has washout and diffusion restriction. New from march. ---A LR-3 in March, that grew. It was sub-centimeter, about 8-9 mm, and is now 1.1 cm. It also has other features; hypervascularity, washout. It is now LR-5. ---Another hypervascular lesion, 3rd, that is new or enlarging with washout. ---1 more in segment 7 that has washout and restricted diffusion, measuring 1 cm. Pretty impressive on diffusion and new from march. *New lesions popping.   ************************************************** DISCUSSION AND RECOMMENDATIONS:  HEPATOLOGY/ TRANSPLANT -Will need systemic therapy.  INTERVENTION RADIOLOGY (IR) -Agree with systemic therapy. -I am not concerned about the tiny nodule in the treated area, however, I am concerned about the new sites. ---The new lesions are tiny. It will be hard to get a biopsy.  MEDICAL ONCOLOGY -I agree with systemic therapy. -A biopsy is needed to confirm the diagnosis for treatment with systemic therapy.    PLAN -Refer to Medical Oncology for systemic therapy. -Refer to Intervention Radiology (IR). Patient will need a biopsy.  Previous note- feels well, trying to optimize FS, does not walk every day; no complaints states he saw cardiologist and echo was unremarkable, no Cardio/pulm sx no sx of liver decompensation has MRI done June'24 - reviewed   EXAM: [id number] - MR ABDOMEN WAW IC - ORDERED BY: MILAN ZULUAGA   PROCEDURE DATE: 06/17/2024    INTERPRETATION: CLINICAL INFORMATION: Hepatocellular carcinoma. Status post radio embolization on 9/18/2023. Follow-up.  COMPARISON: 3/20/2024. 4/14/2024.  CONTRAST/COMPLICATIONS: IV Contrast: Gadavist 9.5 cc administered 0.5 cc discarded Oral Contrast: NONE Complications: None reported at time of study completion  PROCEDURE: MRI of the abdomen was performed. MRCP was performed.  FINDINGS: LOWER CHEST: Median sternotomy. Epicardial leads.  LIVER: Mildly enlarged caudate and medial segment atrophy. Radioembolization change within segments 2 and 7.  Lesion #: 1 Location: Segment 7 Size: Interval increase in size of the hypervascular nodule in the periphery of the treatment cavity measuring 0.9 cm (14, 21), previously 0.6 cm. The nodule demonstrates diffusion restriction. Enhancement: Yes; Nodular/Masslike/Thick irregular. Washout: YES LI-RADS v 2018 Category: LR-TR Viable  Lesion #: 2 Location: Segment 2 Size: Poorly defined, markedly heterogeneous treatment zone. Diffusion restriction within the treatment zone appears increased compared to 3/20/2024. Enhancement: Equivocal enhancement. Washout: NO LI-RADS v 2018 Category: LR-TR Equivocal, although suspicious for possible viable tumor.  New and enlarging hypervascular observations as follows: * New 1.0 cm hypervascular observation within segment 7 with washout and diffusion restriction (14, 20); LR 5 * New 1.0 cm hypervascular observation within segment 5 with washout and diffusion restriction (14, 45); LR 5. * 1.1 cm hypervascular observation within the caudate with washout and diffusion restriction, previously 0.5 cm; LR 5. * New 0.7 cm hypervascular observation within segment 6/7 with washout and diffusion restriction (14, 32); LR 4  BILE DUCTS: Normal caliber. GALLBLADDER: Within normal limits. SPLEEN: Within normal limits. PANCREAS: Within normal limits. ADRENALS: Within normal limits. KIDNEYS/URETERS: Within normal limits.  VISUALIZED PORTIONS: BOWEL: Within normal limits. PERITONEUM: No ascites. VESSELS: Within normal limits. RETROPERITONEUM/LYMPH NODES: No lymphadenopathy. ABDOMINAL WALL: Within normal limits. BONES: Within normal limits.  IMPRESSION: * Interval progression multifocal HCC since 3/20/2024, with new LR4-and LR5 observations as detailed above. * Treatment cavity of segment 7 with an enlarging peripheral nodule; LR-TR Viable. * Markedly heterogeneous treatment zone of segment 2; LR-TR Equivocal, though suspicious for possible viable tumor.     previous note has been having sub xyphiod cp with deep breath, not with activity, no radiation pattern; no change in activity; no heavy lifting; no orthopnea, has baseline DUBON after a few blocks; has f/u with cardiololgy 4/1 no cough; no fevers chill got back from [place] on 3/13; had MRI done 3/20 (not read yet) meds reviewed, no changes no etoh no sx of liver decompensation no GIB   previous note- feels well, c/o constipation; no sx of liver decompensation seen by Dr. Devlin 11/9; wants to travel to [facility] from 11/29 to first week on March'24. MRI 10/31/23- PROCEDURE DATE: 10/31/2023    INTERPRETATION: CLINICAL INFORMATION: Hepatocellular carcinoma status post radioembolization to segments 2/3 on 9/18/2023 and radioembolization to a segments 7 mass 7/25/2023  COMPARISON: MRI 9/28/2023, 4/19/2023  CONTRAST/COMPLICATIONS: IV Contrast: Gadavist 9 cc administered 1 cc discarded Oral Contrast: NONE Complications: None reported at time of study completion  PROCEDURE: MRI of the abdomen was performed. MRCP was performed.  FINDINGS: LOWER CHEST: Sternotomy.  LIVER: Mild enlargement of the caudate lobe with medial segment atrophy. Status post interval radioembolization of a segment 2 lesion. Prior embolization of a segment 7 lesion:  Lesion #: 1 Location: Segment 7 Size: 2.4 cm treatment cavity (19:21), previously 2.3 cm. Surrounding posttreatment changes. Enhancement: Yes; Thin peripheral rim/Treatment specific expected enhancement. Washout: NO LI-RADS v 2018 Category: LR-TR Nonviable  Lesion #: 2 Location: Segment 2 Size: 1.4 cm (12:28), previously 2.0 cm. Enhancement: Yes; mild persistent internal enhancement decreased from prior. Washout: NO LI-RADS v 2018 Category: LR-TR Equivocal  An additional 1.5 cm observation anterior and inferior to the treated segment 2 lesion may represent adjacent posttreatment changes however imaging characteristics are somewhat concerning as this observation demonstrates T2 signal with arterial enhancement and washout. No lesion was noted at prior imaging in August 2023 in this region. This is indeterminate, attention on follow-up imaging is recommended.  BILE DUCTS: Normal caliber. GALLBLADDER: Within normal limits. SPLEEN: Within normal limits. PANCREAS: Within normal limits. ADRENALS: Within normal limits. KIDNEYS/URETERS: Within normal limits.  VISUALIZED PORTIONS: BOWEL: Within normal limits. PERITONEUM: No ascites. VESSELS: Atherosclerotic changes. SMV, portal and hepatic veins are patent. RETROPERITONEUM/LYMPH NODES: No lymphadenopathy. ABDOMINAL WALL: Within normal limits. BONES: Within normal limits.  IMPRESSION: Treated lesion in segment 7 without evidence of viability (LR-TR Nonviable).  Interval treatment of a segment 2 lesion which demonstrates decrease in size. Mild persistent internal enhancement (LR-TR Equivocal).  A new observation anterior and inferior to the treated lesion in segment 2 as above is indeterminate. Attention at follow-up imaging in 3 months is recommended.   Prior HPI: Pt established care with us after referral from Dr. Squires for evaluation of liver lesion c/f HCC. Patient with long standing hx of HBV and fatty liver (~ 20 yrs) -- HBV tx with 'antiviral' (cannot recall which) for many years and then about 2 yrs ago was advised to d/c by MD. Recently seen by Dr. Squires and started on Entecavir 0.5 mg/day. No episodes of liver decompensation. No prior liver bx. No known family hx of liver disease. Born in [place] and in [place] for many years. Previously worked in construction and now retired. Lives at home with family. No current or hx of ETOH use. No IVDU. No blood transfusions. No OTC med/supps/herbals.  4/19/23 MRI Abd reviewed on 4/27/23 Hepatobiliary Tumor Board: liver with borderline morphology, no classic posterior notching or widened fissures however nodularity on posterior right lobe c/w early signs of cirrhosis. Lesion #1: Segment 7, 2.2 cm, LR 5 + Lesion #2: Segment 2, 2 cm, LR 5. Consensus recommendation to IR for LDT (Segment 7 lesion ablation/Y90 & Segment 2 lesion Y90). 5/4/23 CT Chest done and w/o c/f metastasis. Now s/p Y90 to Segment 7 on 7/25/23 and additional Y90 on 9/18/23 to Segment 2 lesion.

## 2024-08-27 NOTE — DATA REVIEWED
[de-identified] :  Type B tymps, AU. Testing via inserts & headphones: AD- Mild mixed HL up to 3kHz, sloping to mod-sev, sharply sloping to sev. AS- Moderate / mild mixed HL up to 4kHz, sharply sloping to sev / profound. Recs: 1) F/u w/ MD 2) Re-eval post med intervention

## 2024-08-27 NOTE — ASSESSMENT
[FreeTextEntry1] : Patient with chronic OM - having difficulty wiht right ear. Right otorrhea improved and ear debrided -has tube in ear which is extruding.  Has bilat snhl - worse right .with some mixed component.  Recommended continue ofloxacin drops for 2-3 more days, continue dry ear precautions and followup 3 weeks.  If ears are clear would recommend HAE

## 2024-08-30 ENCOUNTER — APPOINTMENT (OUTPATIENT)
Dept: INFUSION THERAPY | Facility: HOSPITAL | Age: 74
End: 2024-08-30

## 2024-08-30 ENCOUNTER — NON-APPOINTMENT (OUTPATIENT)
Age: 74
End: 2024-08-30

## 2024-09-03 ENCOUNTER — TRANSCRIPTION ENCOUNTER (OUTPATIENT)
Age: 74
End: 2024-09-03

## 2024-09-03 ENCOUNTER — OUTPATIENT (OUTPATIENT)
Dept: OUTPATIENT SERVICES | Facility: HOSPITAL | Age: 74
LOS: 1 days | End: 2024-09-03
Payer: MEDICARE

## 2024-09-03 ENCOUNTER — APPOINTMENT (OUTPATIENT)
Dept: CT IMAGING | Facility: CLINIC | Age: 74
End: 2024-09-03
Payer: MEDICARE

## 2024-09-03 DIAGNOSIS — Z98.890 OTHER SPECIFIED POSTPROCEDURAL STATES: Chronic | ICD-10-CM

## 2024-09-03 DIAGNOSIS — Z95.1 PRESENCE OF AORTOCORONARY BYPASS GRAFT: Chronic | ICD-10-CM

## 2024-09-03 DIAGNOSIS — D50.9 IRON DEFICIENCY ANEMIA, UNSPECIFIED: ICD-10-CM

## 2024-09-03 DIAGNOSIS — C22.9 MALIGNANT NEOPLASM OF LIVER, NOT SPECIFIED AS PRIMARY OR SECONDARY: ICD-10-CM

## 2024-09-03 PROCEDURE — 71260 CT THORAX DX C+: CPT | Mod: 26,MH

## 2024-09-03 PROCEDURE — 71260 CT THORAX DX C+: CPT

## 2024-09-03 PROCEDURE — 74177 CT ABD & PELVIS W/CONTRAST: CPT

## 2024-09-03 PROCEDURE — 74177 CT ABD & PELVIS W/CONTRAST: CPT | Mod: 26,MH

## 2024-09-06 ENCOUNTER — APPOINTMENT (OUTPATIENT)
Dept: INFUSION THERAPY | Facility: HOSPITAL | Age: 74
End: 2024-09-06

## 2024-09-09 ENCOUNTER — APPOINTMENT (OUTPATIENT)
Dept: HEMATOLOGY ONCOLOGY | Facility: CLINIC | Age: 74
End: 2024-09-09

## 2024-09-09 ENCOUNTER — LABORATORY RESULT (OUTPATIENT)
Age: 74
End: 2024-09-09

## 2024-09-09 VITALS
TEMPERATURE: 97.7 F | HEART RATE: 66 BPM | BODY MASS INDEX: 31.57 KG/M2 | SYSTOLIC BLOOD PRESSURE: 105 MMHG | OXYGEN SATURATION: 97 % | DIASTOLIC BLOOD PRESSURE: 64 MMHG | RESPIRATION RATE: 16 BRPM | HEIGHT: 68 IN | WEIGHT: 208.33 LBS

## 2024-09-09 DIAGNOSIS — C22.9 MALIGNANT NEOPLASM OF LIVER, NOT SPECIFIED AS PRIMARY OR SECONDARY: ICD-10-CM

## 2024-09-09 PROCEDURE — G2211 COMPLEX E/M VISIT ADD ON: CPT

## 2024-09-09 PROCEDURE — 99213 OFFICE O/P EST LOW 20 MIN: CPT

## 2024-09-09 RX ORDER — METOPROLOL TARTRATE 50 MG/1
50 TABLET, FILM COATED ORAL TWICE DAILY
Refills: 0 | Status: ACTIVE | COMMUNITY
Start: 2024-09-09

## 2024-09-10 ENCOUNTER — OUTPATIENT (OUTPATIENT)
Dept: OUTPATIENT SERVICES | Facility: HOSPITAL | Age: 74
LOS: 1 days | Discharge: HOME | End: 2024-09-10

## 2024-09-10 DIAGNOSIS — Z98.49 CATARACT EXTRACTION STATUS, UNSPECIFIED EYE: Chronic | ICD-10-CM

## 2024-09-10 DIAGNOSIS — Z95.1 PRESENCE OF AORTOCORONARY BYPASS GRAFT: Chronic | ICD-10-CM

## 2024-09-10 DIAGNOSIS — Z98.890 OTHER SPECIFIED POSTPROCEDURAL STATES: Chronic | ICD-10-CM

## 2024-09-10 DIAGNOSIS — C22.0 LIVER CELL CARCINOMA: ICD-10-CM

## 2024-09-11 LAB
AFP-TM SERPL-MCNC: 7 NG/ML
ALBUMIN SERPL ELPH-MCNC: 4.1 G/DL
ALP BLD-CCNC: 124 U/L
ALT SERPL-CCNC: 13 U/L
ANION GAP SERPL CALC-SCNC: 16 MMOL/L
APTT BLD: 30.4 SEC
AST SERPL-CCNC: 22 U/L
BASOPHILS # BLD AUTO: 0.04 K/UL
BASOPHILS NFR BLD AUTO: 0.6 %
BILIRUB SERPL-MCNC: 0.4 MG/DL
BUN SERPL-MCNC: 12 MG/DL
CALCIUM SERPL-MCNC: 9.2 MG/DL
CHLORIDE SERPL-SCNC: 103 MMOL/L
CO2 SERPL-SCNC: 20 MMOL/L
CREAT SERPL-MCNC: 0.96 MG/DL
EGFR: 83 ML/MIN/1.73M2
EOSINOPHIL # BLD AUTO: 0.25 K/UL
EOSINOPHIL NFR BLD AUTO: 3.9 %
GLUCOSE SERPL-MCNC: 161 MG/DL
HCT VFR BLD CALC: 39.8 %
HGB BLD-MCNC: 11.6 G/DL
IMM GRANULOCYTES NFR BLD AUTO: 0.5 %
INR PPP: 0.98 RATIO
LYMPHOCYTES # BLD AUTO: 1.34 K/UL
LYMPHOCYTES NFR BLD AUTO: 20.9 %
MAN DIFF?: NORMAL
MCHC RBC-ENTMCNC: 22.6 PG
MCHC RBC-ENTMCNC: 29.1 GM/DL
MCV RBC AUTO: 77.6 FL
MONOCYTES # BLD AUTO: 0.54 K/UL
MONOCYTES NFR BLD AUTO: 8.4 %
NEUTROPHILS # BLD AUTO: 4.2 K/UL
NEUTROPHILS NFR BLD AUTO: 65.7 %
PLATELET # BLD AUTO: 139 K/UL
POTASSIUM SERPL-SCNC: 4.6 MMOL/L
PROT SERPL-MCNC: 7.6 G/DL
PT BLD: 11.1 SEC
RBC # BLD: 5.13 M/UL
RBC # FLD: 24.3 %
SODIUM SERPL-SCNC: 139 MMOL/L
WBC # FLD AUTO: 6.4 K/UL

## 2024-09-12 NOTE — DISCHARGE NOTE PROVIDER - NSDCADMDATE_GEN_ALL_CORE_FT
Pt here for C4D1 Drug(s)Opdivo.  Arrives Ambulating independently, accompanied by Spouse     Patient was evaluated today by MD.    Oral medications included in this regimen:  no    Patient confirms comprehension of cancer treatment schedule:  yes    Pregnancy screening:  Not applicable    Modifications in dose or schedule:  No    Medications appearance and physical integrity checked by RN: yes.    Chemotherapy IV pump settings verified by 2 RNs:  No due to targeted therapy IV administration.  Frequency of blood return and site check throughout administration: Prior to administration and At completion of therapy     Infusion/treatment outcome:  patient tolerated treatment without incident    Education Record    Learner:  Patient and Spouse  Barriers / Limitations:  None  Method:  Brief focused and Reinforcement  Education / instructions given:  Plan of care reviewed.  Outcome:  Shows understanding    Discharged Home, Ambulating independently, accompanied by:Spouse    Patient/family verbalized understanding of future appointments: by MyChart messaging     29-Nov-2019 16:31

## 2024-09-16 ENCOUNTER — APPOINTMENT (OUTPATIENT)
Dept: INFUSION THERAPY | Facility: HOSPITAL | Age: 74
End: 2024-09-16

## 2024-09-19 ENCOUNTER — NON-APPOINTMENT (OUTPATIENT)
Age: 74
End: 2024-09-19

## 2024-09-20 NOTE — HISTORY OF PRESENT ILLNESS
[Disease: _____________________] : Disease: [unfilled] [AJCC Stage: ____] : AJCC Stage: [unfilled] [Therapy: ___] : Therapy: [unfilled] [Date: ____________] : Patient's last distress assessment performed on [unfilled]. 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   4/19/23: MR Ab: liver with borderline morphology, no classic posterior notching or widened fissures however nodularity on posterior right lobe c/w early signs of cirrhosis. Lesion #1: Segment 7, 2.2 cm, LR 5 + Lesion #2: Segment 2, 2 cm, LR 5. Consensus recommendation to IR for LDT (Segment 7 lesion ablation/Y90 & Segment 2 lesion Y90). 5/4/23 CT Chest done and w/o c/f metastasis. Now s/p Y90 to Segment 7 on 7/25/23 and additional Y90 on 9/18/23 to Segment 2 lesion. 5/4/23: CT chest: IGNACIA  5/12/23: Bone scan: IGNACIA 6/27/23: EGD negative for varices  7/25/23: Y90 segment 7  9/18/23: Y90 segment 2/3 10/31/23: MR abd: treated lesion seg 7 LR-TR nonviable. Interval treatment of seg 2 lesion demonstrates decrease in size, mild persistent enhancement. A new observation anterior and inferior to treated lesion in seg 2 indeterminate 3/20/24: MR abd: treated lesions w/in seg 2 and 7 w/o evidence of viable tumor. New 5 mm hypervascular focus within the caudate w/ subtle DWI signal LR3 4/14/24: CT AP: diffuse colonic distension w/ stool.  6/17/24: MR Abd: interval progression multifocal HCC w/ new LR4 and LR6 observations. Treatment cavity of segment 7 with an enlarging peripheral nodule; LR-TR Viable. Markedly heterogeneous treatment zone of segment 2; LR-TR Equivocal, though suspicious for possible viable tumor.  Patient's case was discussed during hepatobiliary tumor board and patient was referred to medical oncology with plan for systemic therapy. Per IR, not able to obtain biopsy due to location and size of the lesions.   Social Hx: He is . He has 4 children- 2 sons and 2 daughters. He lives with his wife and his son, DIL and their 3 children. He has 9 grandchildren.  He denies tobacco or alcohol use. He is retired from working as a contractor. He is from [place], moved here 55 years ago.   7/16/24: CT CAP: multifocal HCC w/ slight increase in size of a few of the lesions  7/22/24: C1 Durvalumab/tremelimumab  8/19/24: C2 Durvalumab 8/30/24-8/6/24: 2 doses injectafer  9/3/24: CT CAP: progression of multifocal HCC. No evidence of metastatic disease  [de-identified] : n/a [de-identified] : AFP 4.7 (3/28/2024) [de-identified] : Hepatitis B PCR - not detected 11/15/23 [FreeTextEntry1] : s/p 2 cycles durva/treme [de-identified] : Here w/ his son for clinical follow up  Tolerated treatment well. No new complaints today  Fatigue: about the same. He is now s/p iron  we reviewed recent scans which show POD

## 2024-09-20 NOTE — HISTORY OF PRESENT ILLNESS
[Disease: _____________________] : Disease: [unfilled] [AJCC Stage: ____] : AJCC Stage: [unfilled] [Therapy: ___] : Therapy: [unfilled] [Date: ____________] : Patient's last distress assessment performed on [unfilled]. 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   4/19/23: MR Ab: liver with borderline morphology, no classic posterior notching or widened fissures however nodularity on posterior right lobe c/w early signs of cirrhosis. Lesion #1: Segment 7, 2.2 cm, LR 5 + Lesion #2: Segment 2, 2 cm, LR 5. Consensus recommendation to IR for LDT (Segment 7 lesion ablation/Y90 & Segment 2 lesion Y90). 5/4/23 CT Chest done and w/o c/f metastasis. Now s/p Y90 to Segment 7 on 7/25/23 and additional Y90 on 9/18/23 to Segment 2 lesion. 5/4/23: CT chest: IGNACIA  5/12/23: Bone scan: IGNACIA 6/27/23: EGD negative for varices  7/25/23: Y90 segment 7  9/18/23: Y90 segment 2/3 10/31/23: MR abd: treated lesion seg 7 LR-TR nonviable. Interval treatment of seg 2 lesion demonstrates decrease in size, mild persistent enhancement. A new observation anterior and inferior to treated lesion in seg 2 indeterminate 3/20/24: MR abd: treated lesions w/in seg 2 and 7 w/o evidence of viable tumor. New 5 mm hypervascular focus within the caudate w/ subtle DWI signal LR3 4/14/24: CT AP: diffuse colonic distension w/ stool.  6/17/24: MR Abd: interval progression multifocal HCC w/ new LR4 and LR6 observations. Treatment cavity of segment 7 with an enlarging peripheral nodule; LR-TR Viable. Markedly heterogeneous treatment zone of segment 2; LR-TR Equivocal, though suspicious for possible viable tumor.  Patient's case was discussed during hepatobiliary tumor board and patient was referred to medical oncology with plan for systemic therapy. Per IR, not able to obtain biopsy due to location and size of the lesions.   Social Hx: He is . He has 4 children- 2 sons and 2 daughters. He lives with his wife and his son, DIL and their 3 children. He has 9 grandchildren.  He denies tobacco or alcohol use. He is retired from working as a contractor. He is from [place], moved here 55 years ago.   7/16/24: CT CAP: multifocal HCC w/ slight increase in size of a few of the lesions  7/22/24: C1 Durvalumab/tremelimumab  8/19/24: C2 Durvalumab 8/30/24-8/6/24: 2 doses injectafer  9/3/24: CT CAP: progression of multifocal HCC. No evidence of metastatic disease  [de-identified] : n/a [de-identified] : AFP 4.7 (3/28/2024) [de-identified] : Hepatitis B PCR - not detected 11/15/23 [FreeTextEntry1] : s/p 2 cycles durva/treme [de-identified] : Here w/ his son for clinical follow up  Tolerated treatment well. No new complaints today  Fatigue: about the same. He is now s/p iron  we reviewed recent scans which show POD

## 2024-09-20 NOTE — HISTORY OF PRESENT ILLNESS
[Disease: _____________________] : Disease: [unfilled] [AJCC Stage: ____] : AJCC Stage: [unfilled] [Therapy: ___] : Therapy: [unfilled] [Date: ____________] : Patient's last distress assessment performed on [unfilled]. 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   4/19/23: MR Ab: liver with borderline morphology, no classic posterior notching or widened fissures however nodularity on posterior right lobe c/w early signs of cirrhosis. Lesion #1: Segment 7, 2.2 cm, LR 5 + Lesion #2: Segment 2, 2 cm, LR 5. Consensus recommendation to IR for LDT (Segment 7 lesion ablation/Y90 & Segment 2 lesion Y90). 5/4/23 CT Chest done and w/o c/f metastasis. Now s/p Y90 to Segment 7 on 7/25/23 and additional Y90 on 9/18/23 to Segment 2 lesion. 5/4/23: CT chest: IGNACIA  5/12/23: Bone scan: IGNACIA 6/27/23: EGD negative for varices  7/25/23: Y90 segment 7  9/18/23: Y90 segment 2/3 10/31/23: MR abd: treated lesion seg 7 LR-TR nonviable. Interval treatment of seg 2 lesion demonstrates decrease in size, mild persistent enhancement. A new observation anterior and inferior to treated lesion in seg 2 indeterminate 3/20/24: MR abd: treated lesions w/in seg 2 and 7 w/o evidence of viable tumor. New 5 mm hypervascular focus within the caudate w/ subtle DWI signal LR3 4/14/24: CT AP: diffuse colonic distension w/ stool.  6/17/24: MR Abd: interval progression multifocal HCC w/ new LR4 and LR6 observations. Treatment cavity of segment 7 with an enlarging peripheral nodule; LR-TR Viable. Markedly heterogeneous treatment zone of segment 2; LR-TR Equivocal, though suspicious for possible viable tumor.  Patient's case was discussed during hepatobiliary tumor board and patient was referred to medical oncology with plan for systemic therapy. Per IR, not able to obtain biopsy due to location and size of the lesions.   Social Hx: He is . He has 4 children- 2 sons and 2 daughters. He lives with his wife and his son, DIL and their 3 children. He has 9 grandchildren.  He denies tobacco or alcohol use. He is retired from working as a contractor. He is from [place], moved here 55 years ago.   7/16/24: CT CAP: multifocal HCC w/ slight increase in size of a few of the lesions  7/22/24: C1 Durvalumab/tremelimumab  8/19/24: C2 Durvalumab 8/30/24-8/6/24: 2 doses injectafer  9/3/24: CT CAP: progression of multifocal HCC. No evidence of metastatic disease  [de-identified] : n/a [de-identified] : AFP 4.7 (3/28/2024) [de-identified] : Hepatitis B PCR - not detected 11/15/23 [FreeTextEntry1] : s/p 2 cycles durva/treme [de-identified] : Here w/ his son for clinical follow up  Tolerated treatment well. No new complaints today  Fatigue: about the same. He is now s/p iron  we reviewed recent scans which show POD

## 2024-09-20 NOTE — HISTORY OF PRESENT ILLNESS
[Disease: _____________________] : Disease: [unfilled] [AJCC Stage: ____] : AJCC Stage: [unfilled] [Therapy: ___] : Therapy: [unfilled] [Date: ____________] : Patient's last distress assessment performed on [unfilled]. 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   4/19/23: MR Ab: liver with borderline morphology, no classic posterior notching or widened fissures however nodularity on posterior right lobe c/w early signs of cirrhosis. Lesion #1: Segment 7, 2.2 cm, LR 5 + Lesion #2: Segment 2, 2 cm, LR 5. Consensus recommendation to IR for LDT (Segment 7 lesion ablation/Y90 & Segment 2 lesion Y90). 5/4/23 CT Chest done and w/o c/f metastasis. Now s/p Y90 to Segment 7 on 7/25/23 and additional Y90 on 9/18/23 to Segment 2 lesion. 5/4/23: CT chest: IGNACIA  5/12/23: Bone scan: IGNACIA 6/27/23: EGD negative for varices  7/25/23: Y90 segment 7  9/18/23: Y90 segment 2/3 10/31/23: MR abd: treated lesion seg 7 LR-TR nonviable. Interval treatment of seg 2 lesion demonstrates decrease in size, mild persistent enhancement. A new observation anterior and inferior to treated lesion in seg 2 indeterminate 3/20/24: MR abd: treated lesions w/in seg 2 and 7 w/o evidence of viable tumor. New 5 mm hypervascular focus within the caudate w/ subtle DWI signal LR3 4/14/24: CT AP: diffuse colonic distension w/ stool.  6/17/24: MR Abd: interval progression multifocal HCC w/ new LR4 and LR6 observations. Treatment cavity of segment 7 with an enlarging peripheral nodule; LR-TR Viable. Markedly heterogeneous treatment zone of segment 2; LR-TR Equivocal, though suspicious for possible viable tumor.  Patient's case was discussed during hepatobiliary tumor board and patient was referred to medical oncology with plan for systemic therapy. Per IR, not able to obtain biopsy due to location and size of the lesions.   Social Hx: He is . He has 4 children- 2 sons and 2 daughters. He lives with his wife and his son, DIL and their 3 children. He has 9 grandchildren.  He denies tobacco or alcohol use. He is retired from working as a contractor. He is from [place], moved here 55 years ago.   7/16/24: CT CAP: multifocal HCC w/ slight increase in size of a few of the lesions  7/22/24: C1 Durvalumab/tremelimumab  8/19/24: C2 Durvalumab 8/30/24-8/6/24: 2 doses injectafer  9/3/24: CT CAP: progression of multifocal HCC. No evidence of metastatic disease  [de-identified] : n/a [de-identified] : AFP 4.7 (3/28/2024) [de-identified] : Hepatitis B PCR - not detected 11/15/23 [FreeTextEntry1] : s/p 2 cycles durva/treme [de-identified] : Here w/ his son for clinical follow up  Tolerated treatment well. No new complaints today  Fatigue: about the same. He is now s/p iron  we reviewed recent scans which show POD

## 2024-09-23 ENCOUNTER — APPOINTMENT (OUTPATIENT)
Dept: OTOLARYNGOLOGY | Facility: CLINIC | Age: 74
End: 2024-09-23
Payer: MEDICARE

## 2024-09-23 VITALS — BODY MASS INDEX: 31.52 KG/M2 | HEIGHT: 68 IN | WEIGHT: 208 LBS

## 2024-09-23 DIAGNOSIS — H61.301 ACQUIRED STENOSIS OF RIGHT EXTERNAL EAR CANAL, UNSPECIFIED: ICD-10-CM

## 2024-09-23 DIAGNOSIS — H92.11 OTORRHEA, RIGHT EAR: ICD-10-CM

## 2024-09-23 DIAGNOSIS — H65.01 ACUTE SEROUS OTITIS MEDIA, RIGHT EAR: ICD-10-CM

## 2024-09-23 PROCEDURE — 99214 OFFICE O/P EST MOD 30 MIN: CPT | Mod: 25

## 2024-09-23 PROCEDURE — 92504 EAR MICROSCOPY EXAMINATION: CPT

## 2024-09-23 RX ORDER — LENVATINIB 4 MG/1
2 X 4 CAPSULE ORAL
Qty: 1 | Refills: 0 | Status: ACTIVE | COMMUNITY
Start: 2024-09-23 | End: 1900-01-01

## 2024-09-23 RX ORDER — SEMAGLUTIDE 0.68 MG/ML
2 INJECTION, SOLUTION SUBCUTANEOUS
Qty: 6 | Refills: 0 | Status: ACTIVE | COMMUNITY
Start: 2024-04-09

## 2024-09-23 NOTE — PROCEDURE
[Risk and Benefits Discussed] : The purpose, risks, discomforts, benefits and alternatives of the procedure have been explained to the patient including no treatment. [Same] : same as the Pre Op Dx. [] : Binocular Microscopy [FreeTextEntry1] : followup right ear infection with tube in right tm and prior right otorhea  [FreeTextEntry4] : NONE [FreeTextEntry6] : stenotic right ear canal - right tube in place and clear - no evidence of infection at this time

## 2024-09-23 NOTE — PHYSICAL EXAM
[de-identified] : stenotic right eac  [de-identified] : right tube in place - see micro; left tm normal  [Midline] : trachea located in midline position [Normal] : no rashes

## 2024-09-23 NOTE — ASSESSMENT
[FreeTextEntry1] : Patient here for followup or right ear infection with otorhea - has  tube in tm. Ear clear now - tube in place and tube appears to patent.  Recommended continued dry ear precautions and followup 3 mos - discussed pros and cons of leaving tube in including tm perf or, if removed , possible need for tube again.

## 2024-09-26 ENCOUNTER — NON-APPOINTMENT (OUTPATIENT)
Age: 74
End: 2024-09-26

## 2024-09-30 NOTE — PHYSICAL THERAPY INITIAL EVALUATION ADULT - PHYSICAL ASSIST/NONPHYSICAL ASSIST: SIT/STAND, REHAB EVAL
verbal cues/1 person + 1 person to manage equipment/verbal cues re proper sequence + proper hand placement in prep to perform transfer; verbal cues re performing transfer in accordance to sternal precautions
PRINCIPAL DISCHARGE DIAGNOSIS  Diagnosis: Syncope  Assessment and Plan of Treatment: 1. Wear heart monitor for 28 says  2.  Take Metoprolol as prescribed  3.  Follow-up with Dr. Womack (cardiology-EP) after discharge.  Please call office for appointment.  Please follow up with your primary care physician.  Please schedule an appointment with your primary care provider within two weeks.         SECONDARY DISCHARGE DIAGNOSES  Diagnosis: SBO (small bowel obstruction)  Assessment and Plan of Treatment: WOUND CARE: Keep incisions clean and dry  BATHING: Please do not submerge wound underwater. You may shower and/or sponge bathe.  ACTIVITY: No heavy lifting or straining. Otherwise, you may return to your usual level of physical activity. If you are taking narcotic pain medication (such as Percocet), do NOT drive a car, operate machinery or make important decisions.  DIET: Low fiber diet  NOTIFY YOUR SURGEON IF: You have any bleeding that does not stop, any pus draining from your wound, any fever (over 100.4 F) or chills, persistent nausea/vomiting, persistent diarrhea, or if your pain is not controlled on your discharge pain medications.  FOLLOW-UP:  1. Follow-up with Dr. Glass within 1-2 weeks of discharge.  Please call office for appointment  2. Please follow up with your primary care physician in one week regarding your hospitalization.

## 2024-10-07 ENCOUNTER — EMERGENCY (EMERGENCY)
Facility: HOSPITAL | Age: 74
LOS: 1 days | Discharge: HOME | End: 2024-10-07
Attending: EMERGENCY MEDICINE
Payer: MEDICARE

## 2024-10-07 ENCOUNTER — APPOINTMENT (OUTPATIENT)
Dept: HEMATOLOGY ONCOLOGY | Facility: CLINIC | Age: 74
End: 2024-10-07
Payer: MEDICARE

## 2024-10-07 VITALS
SYSTOLIC BLOOD PRESSURE: 131 MMHG | TEMPERATURE: 97.2 F | DIASTOLIC BLOOD PRESSURE: 76 MMHG | WEIGHT: 203.04 LBS | HEART RATE: 68 BPM | BODY MASS INDEX: 30.87 KG/M2 | OXYGEN SATURATION: 97 % | RESPIRATION RATE: 16 BRPM

## 2024-10-07 VITALS
HEIGHT: 67 IN | HEART RATE: 61 BPM | OXYGEN SATURATION: 97 % | WEIGHT: 250 LBS | RESPIRATION RATE: 20 BRPM | DIASTOLIC BLOOD PRESSURE: 76 MMHG | SYSTOLIC BLOOD PRESSURE: 146 MMHG | TEMPERATURE: 98 F

## 2024-10-07 DIAGNOSIS — Z98.890 OTHER SPECIFIED POSTPROCEDURAL STATES: Chronic | ICD-10-CM

## 2024-10-07 DIAGNOSIS — Z95.1 PRESENCE OF AORTOCORONARY BYPASS GRAFT: Chronic | ICD-10-CM

## 2024-10-07 DIAGNOSIS — Z98.49 CATARACT EXTRACTION STATUS, UNSPECIFIED EYE: Chronic | ICD-10-CM

## 2024-10-07 LAB
ALBUMIN SERPL ELPH-MCNC: 3.9 G/DL — SIGNIFICANT CHANGE UP (ref 3.3–5)
ALP SERPL-CCNC: 176 U/L — HIGH (ref 40–120)
ALT FLD-CCNC: 40 U/L — SIGNIFICANT CHANGE UP (ref 10–45)
ANION GAP SERPL CALC-SCNC: 13 MMOL/L — SIGNIFICANT CHANGE UP (ref 5–17)
ANISOCYTOSIS BLD QL: SLIGHT — SIGNIFICANT CHANGE UP
APTT BLD: 31.7 SEC — SIGNIFICANT CHANGE UP (ref 24.5–35.6)
AST SERPL-CCNC: 40 U/L — SIGNIFICANT CHANGE UP (ref 10–40)
BASOPHILS # BLD AUTO: 0 K/UL — SIGNIFICANT CHANGE UP (ref 0–0.2)
BASOPHILS NFR BLD AUTO: 0 % — SIGNIFICANT CHANGE UP (ref 0–2)
BILIRUB SERPL-MCNC: 0.5 MG/DL — SIGNIFICANT CHANGE UP (ref 0.2–1.2)
BUN SERPL-MCNC: 12 MG/DL — SIGNIFICANT CHANGE UP (ref 7–23)
CALCIUM SERPL-MCNC: 9.8 MG/DL — SIGNIFICANT CHANGE UP (ref 8.4–10.5)
CHLORIDE SERPL-SCNC: 96 MMOL/L — SIGNIFICANT CHANGE UP (ref 96–108)
CO2 SERPL-SCNC: 24 MMOL/L — SIGNIFICANT CHANGE UP (ref 22–31)
CREAT SERPL-MCNC: 1.06 MG/DL — SIGNIFICANT CHANGE UP (ref 0.5–1.3)
DACRYOCYTES BLD QL SMEAR: SLIGHT — SIGNIFICANT CHANGE UP
EGFR: 74 ML/MIN/1.73M2 — SIGNIFICANT CHANGE UP
ELLIPTOCYTES BLD QL SMEAR: SLIGHT — SIGNIFICANT CHANGE UP
EOSINOPHIL # BLD AUTO: 0.56 K/UL — HIGH (ref 0–0.5)
EOSINOPHIL NFR BLD AUTO: 7.9 % — HIGH (ref 0–6)
GIANT PLATELETS BLD QL SMEAR: PRESENT — SIGNIFICANT CHANGE UP
GLUCOSE SERPL-MCNC: 161 MG/DL — HIGH (ref 70–99)
HCT VFR BLD CALC: 47.6 % — SIGNIFICANT CHANGE UP (ref 39–50)
HGB BLD-MCNC: 14.9 G/DL — SIGNIFICANT CHANGE UP (ref 13–17)
INR BLD: 1.05 RATIO — SIGNIFICANT CHANGE UP (ref 0.85–1.16)
LIDOCAIN IGE QN: 48 U/L — SIGNIFICANT CHANGE UP (ref 7–60)
LYMPHOCYTES # BLD AUTO: 1.18 K/UL — SIGNIFICANT CHANGE UP (ref 1–3.3)
LYMPHOCYTES # BLD AUTO: 16.7 % — SIGNIFICANT CHANGE UP (ref 13–44)
MANUAL SMEAR VERIFICATION: SIGNIFICANT CHANGE UP
MCHC RBC-ENTMCNC: 24.7 PG — LOW (ref 27–34)
MCHC RBC-ENTMCNC: 31.3 GM/DL — LOW (ref 32–36)
MCV RBC AUTO: 78.9 FL — LOW (ref 80–100)
MICROCYTES BLD QL: SLIGHT — SIGNIFICANT CHANGE UP
MONOCYTES # BLD AUTO: 0.37 K/UL — SIGNIFICANT CHANGE UP (ref 0–0.9)
MONOCYTES NFR BLD AUTO: 5.2 % — SIGNIFICANT CHANGE UP (ref 2–14)
NEUTROPHILS # BLD AUTO: 4.96 K/UL — SIGNIFICANT CHANGE UP (ref 1.8–7.4)
NEUTROPHILS NFR BLD AUTO: 70.2 % — SIGNIFICANT CHANGE UP (ref 43–77)
OVALOCYTES BLD QL SMEAR: SLIGHT — SIGNIFICANT CHANGE UP
PLAT MORPH BLD: ABNORMAL
PLATELET # BLD AUTO: 192 K/UL — SIGNIFICANT CHANGE UP (ref 150–400)
POIKILOCYTOSIS BLD QL AUTO: SLIGHT — SIGNIFICANT CHANGE UP
POLYCHROMASIA BLD QL SMEAR: SLIGHT — SIGNIFICANT CHANGE UP
POTASSIUM SERPL-MCNC: 4.8 MMOL/L — SIGNIFICANT CHANGE UP (ref 3.5–5.3)
POTASSIUM SERPL-SCNC: 4.8 MMOL/L — SIGNIFICANT CHANGE UP (ref 3.5–5.3)
PROT SERPL-MCNC: 9 G/DL — HIGH (ref 6–8.3)
PROTHROM AB SERPL-ACNC: 12.1 SEC — SIGNIFICANT CHANGE UP (ref 9.9–13.4)
RBC # BLD: 6.03 M/UL — HIGH (ref 4.2–5.8)
RBC # FLD: 22.4 % — HIGH (ref 10.3–14.5)
RBC BLD AUTO: ABNORMAL
SODIUM SERPL-SCNC: 133 MMOL/L — LOW (ref 135–145)
WBC # BLD: 7.07 K/UL — SIGNIFICANT CHANGE UP (ref 3.8–10.5)
WBC # FLD AUTO: 7.07 K/UL — SIGNIFICANT CHANGE UP (ref 3.8–10.5)

## 2024-10-07 PROCEDURE — 72132 CT LUMBAR SPINE W/DYE: CPT | Mod: 26,MC

## 2024-10-07 PROCEDURE — 99285 EMERGENCY DEPT VISIT HI MDM: CPT | Mod: FS

## 2024-10-07 PROCEDURE — 99213 OFFICE O/P EST LOW 20 MIN: CPT

## 2024-10-07 PROCEDURE — 71275 CT ANGIOGRAPHY CHEST: CPT | Mod: 26,MC

## 2024-10-07 PROCEDURE — 74177 CT ABD & PELVIS W/CONTRAST: CPT | Mod: 26,MC

## 2024-10-07 RX ORDER — SODIUM CHLORIDE 0.9 % (FLUSH) 0.9 %
1000 SYRINGE (ML) INJECTION ONCE
Refills: 0 | Status: COMPLETED | OUTPATIENT
Start: 2024-10-07 | End: 2024-10-07

## 2024-10-07 RX ORDER — ACETAMINOPHEN 325 MG
1000 TABLET ORAL ONCE
Refills: 0 | Status: COMPLETED | OUTPATIENT
Start: 2024-10-07 | End: 2024-10-07

## 2024-10-07 RX ADMIN — Medication 400 MILLIGRAM(S): at 20:26

## 2024-10-07 RX ADMIN — Medication 1000 MILLILITER(S): at 20:26

## 2024-10-07 NOTE — ED ADULT NURSE NOTE - NSFALLUNIVINTERV_ED_ALL_ED
Bed/Stretcher in lowest position, wheels locked, appropriate side rails in place/Call bell, personal items and telephone in reach/Instruct patient to call for assistance before getting out of bed/chair/stretcher/Non-slip footwear applied when patient is off stretcher/[place] to call system/Physically safe environment - no spills, clutter or unnecessary equipment/Purposeful proactive rounding/Room/bathroom lighting operational, light cord in reach

## 2024-10-07 NOTE — ED PROVIDER NOTE - PATIENT PORTAL LINK FT
You can access the FollowMyHealth Patient Portal offered by [facility] by registering at the following website: [email or web address]. By joining [facility]’s FollowMyHealth portal, you will also be able to view your health information using other applications (apps) compatible with our system.

## 2024-10-07 NOTE — ED PROVIDER NOTE - NSFOLLOWUPINSTRUCTIONS_ED_ALL_ED_FT
PLEASE FOLLOW UP WITH YOUR ONCOLOGIST.      PLEASE TAKE 1000 MG OF TYLENOL EVERY 6 HOURS FOR PAIN.    Please return if you have new or worsening pain, are unable to keep down fluids, or if you are otherwise concerned.

## 2024-10-07 NOTE — ED PROVIDER NOTE - OBJECTIVE STATEMENT
73 yo M pmhx of HCC on oral chemo started 10 days prior presents for 4 days of epigastric abdominal pain worse with inspiration. States last BM was 1 week prior but has been passing flatus. STate  Denies fever, chills, nausea, vomiting, chest pain, shortness of breath, hematuria, dysuria, or any other symptoms at this time. [place] PGY2

## 2024-10-07 NOTE — ED PROVIDER NOTE - PHYSICAL EXAMINATION
Kirstin Bartlett DO (PGY1)   Physical Exam:    Gen: NAD, AOx3, non-toxic appearing, able to ambulate without assistance  Lung: CTAB, no respiratory distress, no wheezes/rhonchi/rales B/L  CV: RRR, no murmurs, rubs or gallops  Abd:  ttp RUQ and RLQ. otherwise soft and non-distended

## 2024-10-07 NOTE — ED PROVIDER NOTE - RAPID ASSESSMENT
73 yo M with HCC here from oncologist Dr. Turner office for c/f worsening abdominal pain, weakness and decreased appetite over the past 2-3 days. Denies fevers, chills, diarrhea, urinary complaints.    Patient was rapidly assessed via a telemedicine and/or role of Quick Triage Doctor; a limited history, physical exam and assessment was performed. The patient will be seen and further evaluated in the main emergency department. The remainder of care and evaluation will be conducted by the primary emergency medicine team. Receiving team will follow up on labs, imaging and serially reassess patient as indicated. All further decisions regarding patient care, evaluation and disposition are at the discretion of the receiving primary emergency department team. -Liliana Carlos PA-C

## 2024-10-07 NOTE — ED PROVIDER NOTE - CLINICAL SUMMARY MEDICAL DECISION MAKING FREE TEXT BOX
73 yo M pmhx of HCC with no mets on oral chemo started 10 days prior presents for 4 days of epigastric abdominal pain worse with inspiration. On exam patient is vitally stable and afebrile but is tender to palpation in the epigastric/right upper quadrant region and right lower quadrant region.  Given description of pain and active cancer history on chemo will perform CTA to eval for PE versus SBO versus worsening metastases causing abdominal pain.  Will treat symptomatically with fluids and Ofirmev.  Likely discharge with on-call follow-up.  [place] PGY 2

## 2024-10-07 NOTE — ED ADULT NURSE NOTE - OBJECTIVE STATEMENT
75 yo M with HCC here from oncologist Dr. Turner office for c/f worsening abdominal pain, weakness and decreased appetite over the past 2-3 days. Denies fevers, chills, diarrhea, urinary complaints. 75 yo M with HCC here from oncologist Dr. Turner office for c/f worsening abdominal pain, weakness and decreased appetite over the past 2-3 days. Reports last BM 2 days ago, denies blood in stool. Denies fevers, chills, diarrhea, urinary complaints. Reported to outpatient oncologist, who instructed pt to report to ED for further evaluation. Upon assessment, A&Ox4, endorses 4/10 abdominal pain radiating to back. Denies chest pain, SOB, n/v, fevers, chills, changes in urination. Vitals WNL.

## 2024-10-08 VITALS
OXYGEN SATURATION: 98 % | HEART RATE: 68 BPM | TEMPERATURE: 98 F | DIASTOLIC BLOOD PRESSURE: 82 MMHG | RESPIRATION RATE: 16 BRPM | SYSTOLIC BLOOD PRESSURE: 132 MMHG

## 2024-10-08 LAB
APPEARANCE UR: CLEAR — SIGNIFICANT CHANGE UP
BACTERIA # UR AUTO: NEGATIVE /HPF — SIGNIFICANT CHANGE UP
BILIRUB UR-MCNC: NEGATIVE — SIGNIFICANT CHANGE UP
CAST: 0 /LPF — SIGNIFICANT CHANGE UP (ref 0–4)
COLOR SPEC: YELLOW — SIGNIFICANT CHANGE UP
DIFF PNL FLD: NEGATIVE — SIGNIFICANT CHANGE UP
GLUCOSE UR QL: NEGATIVE MG/DL — SIGNIFICANT CHANGE UP
KETONES UR-MCNC: NEGATIVE MG/DL — SIGNIFICANT CHANGE UP
LEUKOCYTE ESTERASE UR-ACNC: NEGATIVE — SIGNIFICANT CHANGE UP
NITRITE UR-MCNC: NEGATIVE — SIGNIFICANT CHANGE UP
PH UR: 6.5 — SIGNIFICANT CHANGE UP (ref 5–8)
PROT UR-MCNC: NEGATIVE MG/DL — SIGNIFICANT CHANGE UP
RBC CASTS # UR COMP ASSIST: 0 /HPF — SIGNIFICANT CHANGE UP (ref 0–4)
REVIEW: SIGNIFICANT CHANGE UP
SP GR SPEC: 1.03 — SIGNIFICANT CHANGE UP (ref 1–1.03)
SQUAMOUS # UR AUTO: 0 /HPF — SIGNIFICANT CHANGE UP (ref 0–5)
UROBILINOGEN FLD QL: 0.2 MG/DL — SIGNIFICANT CHANGE UP (ref 0.2–1)
WBC UR QL: 0 /HPF — SIGNIFICANT CHANGE UP (ref 0–5)

## 2024-10-08 RX ORDER — OXYCODONE HYDROCHLORIDE 30 MG/1
1 TABLET, FILM COATED, EXTENDED RELEASE ORAL
Qty: 6 | Refills: 0
Start: 2024-10-08 | End: 2024-10-09

## 2024-10-08 RX ORDER — MAG HYDROX/ALUMINUM HYD/SIMETH 400-400-40
400-400-40 SUSPENSION, ORAL (FINAL DOSE FORM) ORAL
Qty: 1 | Refills: 0 | Status: ACTIVE | COMMUNITY
Start: 2024-10-08 | End: 1900-01-01

## 2024-10-09 LAB
CULTURE RESULTS: SIGNIFICANT CHANGE UP
SPECIMEN SOURCE: SIGNIFICANT CHANGE UP

## 2024-10-10 ENCOUNTER — APPOINTMENT (OUTPATIENT)
Dept: HEMATOLOGY ONCOLOGY | Facility: CLINIC | Age: 74
End: 2024-10-10

## 2024-10-10 VITALS
OXYGEN SATURATION: 99 % | HEART RATE: 64 BPM | DIASTOLIC BLOOD PRESSURE: 82 MMHG | TEMPERATURE: 97.5 F | WEIGHT: 203 LBS | SYSTOLIC BLOOD PRESSURE: 147 MMHG | RESPIRATION RATE: 16 BRPM | BODY MASS INDEX: 30.87 KG/M2

## 2024-10-10 DIAGNOSIS — R53.82 CHRONIC FATIGUE, UNSPECIFIED: ICD-10-CM

## 2024-10-10 DIAGNOSIS — R10.13 EPIGASTRIC PAIN: ICD-10-CM

## 2024-10-10 DIAGNOSIS — R53.81 CHRONIC FATIGUE, UNSPECIFIED: ICD-10-CM

## 2024-10-10 PROBLEM — R63.4 UNINTENTIONAL WEIGHT LOSS: Status: ACTIVE | Noted: 2024-10-10

## 2024-10-10 PROBLEM — R10.9 ABDOMINAL PAIN: Status: ACTIVE | Noted: 2024-10-10

## 2024-10-10 PROCEDURE — 99214 OFFICE O/P EST MOD 30 MIN: CPT

## 2024-10-10 PROCEDURE — G2211 COMPLEX E/M VISIT ADD ON: CPT

## 2024-10-10 RX ORDER — PANTOPRAZOLE 40 MG/1
40 TABLET, DELAYED RELEASE ORAL DAILY
Qty: 1 | Refills: 0 | Status: ACTIVE | COMMUNITY
Start: 2024-10-10 | End: 1900-01-01

## 2024-10-10 RX ORDER — FAMOTIDINE 20 MG/1
20 TABLET, FILM COATED ORAL
Qty: 60 | Refills: 0 | Status: ACTIVE | COMMUNITY
Start: 2024-10-08 | End: 1900-01-01

## 2024-10-12 LAB
25(OH)D3 SERPL-MCNC: 45.5 NG/ML
FERRITIN SERPL-MCNC: 1014 NG/ML
IRON SATN MFR SERPL: 18 %
IRON SERPL-MCNC: 46 UG/DL
TIBC SERPL-MCNC: 262 UG/DL
UIBC SERPL-MCNC: 216 UG/DL
VIT B12 SERPL-MCNC: 818 PG/ML

## 2024-10-14 ENCOUNTER — APPOINTMENT (OUTPATIENT)
Dept: INFUSION THERAPY | Facility: HOSPITAL | Age: 74
End: 2024-10-14

## 2024-10-14 ENCOUNTER — APPOINTMENT (OUTPATIENT)
Dept: INTERVENTIONAL RADIOLOGY/VASCULAR | Facility: CLINIC | Age: 74
End: 2024-10-14
Payer: MEDICARE

## 2024-10-14 DIAGNOSIS — R77.2 ABNORMALITY OF ALPHAFETOPROTEIN: ICD-10-CM

## 2024-10-14 DIAGNOSIS — Z87.898 PERSONAL HISTORY OF OTHER SPECIFIED CONDITIONS: ICD-10-CM

## 2024-10-14 DIAGNOSIS — R63.4 ABNORMAL WEIGHT LOSS: ICD-10-CM

## 2024-10-14 PROCEDURE — 99213 OFFICE O/P EST LOW 20 MIN: CPT

## 2024-10-21 ENCOUNTER — APPOINTMENT (OUTPATIENT)
Dept: HEMATOLOGY ONCOLOGY | Facility: CLINIC | Age: 74
End: 2024-10-21

## 2024-10-21 VITALS
TEMPERATURE: 97 F | RESPIRATION RATE: 16 BRPM | OXYGEN SATURATION: 98 % | WEIGHT: 200.5 LBS | SYSTOLIC BLOOD PRESSURE: 155 MMHG | DIASTOLIC BLOOD PRESSURE: 75 MMHG | BODY MASS INDEX: 30.49 KG/M2 | HEART RATE: 61 BPM

## 2024-10-21 PROCEDURE — G2211 COMPLEX E/M VISIT ADD ON: CPT

## 2024-10-21 PROCEDURE — 99213 OFFICE O/P EST LOW 20 MIN: CPT

## 2024-10-22 ENCOUNTER — APPOINTMENT (OUTPATIENT)
Dept: HEPATOLOGY | Facility: CLINIC | Age: 74
End: 2024-10-22
Payer: MEDICARE

## 2024-10-22 VITALS
HEART RATE: 83 BPM | HEIGHT: 68 IN | TEMPERATURE: 97.7 F | WEIGHT: 199 LBS | DIASTOLIC BLOOD PRESSURE: 75 MMHG | OXYGEN SATURATION: 97 % | BODY MASS INDEX: 30.16 KG/M2 | SYSTOLIC BLOOD PRESSURE: 136 MMHG

## 2024-10-22 DIAGNOSIS — R54 AGE-RELATED PHYSICAL DEBILITY: ICD-10-CM

## 2024-10-22 DIAGNOSIS — B18.1 CHRONIC VIRAL HEPATITIS B W/OUT DELTA-AGENT: ICD-10-CM

## 2024-10-22 PROCEDURE — 99215 OFFICE O/P EST HI 40 MIN: CPT

## 2024-11-10 ENCOUNTER — OUTPATIENT (OUTPATIENT)
Dept: OUTPATIENT SERVICES | Facility: HOSPITAL | Age: 74
LOS: 1 days | Discharge: HOME | End: 2024-11-10

## 2024-11-10 DIAGNOSIS — Z98.890 OTHER SPECIFIED POSTPROCEDURAL STATES: Chronic | ICD-10-CM

## 2024-11-10 DIAGNOSIS — Z98.49 CATARACT EXTRACTION STATUS, UNSPECIFIED EYE: Chronic | ICD-10-CM

## 2024-11-10 DIAGNOSIS — C22.0 LIVER CELL CARCINOMA: ICD-10-CM

## 2024-11-10 DIAGNOSIS — Z95.1 PRESENCE OF AORTOCORONARY BYPASS GRAFT: Chronic | ICD-10-CM

## 2024-11-11 ENCOUNTER — APPOINTMENT (OUTPATIENT)
Dept: INFUSION THERAPY | Facility: HOSPITAL | Age: 74
End: 2024-11-11

## 2024-11-12 ENCOUNTER — APPOINTMENT (OUTPATIENT)
Dept: HEMATOLOGY ONCOLOGY | Facility: CLINIC | Age: 74
End: 2024-11-12
Payer: MEDICARE

## 2024-11-12 ENCOUNTER — NON-APPOINTMENT (OUTPATIENT)
Age: 74
End: 2024-11-12

## 2024-11-12 ENCOUNTER — RESULT REVIEW (OUTPATIENT)
Age: 74
End: 2024-11-12

## 2024-11-12 ENCOUNTER — APPOINTMENT (OUTPATIENT)
Dept: HEMATOLOGY ONCOLOGY | Facility: CLINIC | Age: 74
End: 2024-11-12

## 2024-11-12 VITALS
BODY MASS INDEX: 30.84 KG/M2 | DIASTOLIC BLOOD PRESSURE: 65 MMHG | WEIGHT: 202.82 LBS | HEART RATE: 65 BPM | SYSTOLIC BLOOD PRESSURE: 145 MMHG | OXYGEN SATURATION: 98 % | TEMPERATURE: 97.7 F | RESPIRATION RATE: 17 BRPM

## 2024-11-12 LAB
BASOPHILS # BLD AUTO: 0.04 K/UL — SIGNIFICANT CHANGE UP (ref 0–0.2)
BASOPHILS NFR BLD AUTO: 0.7 % — SIGNIFICANT CHANGE UP (ref 0–2)
EOSINOPHIL # BLD AUTO: 0.32 K/UL — SIGNIFICANT CHANGE UP (ref 0–0.5)
EOSINOPHIL NFR BLD AUTO: 5.3 % — SIGNIFICANT CHANGE UP (ref 0–6)
HCT VFR BLD CALC: 44.8 % — SIGNIFICANT CHANGE UP (ref 39–50)
HGB BLD-MCNC: 14.3 G/DL — SIGNIFICANT CHANGE UP (ref 13–17)
IMM GRANULOCYTES NFR BLD AUTO: 0.3 % — SIGNIFICANT CHANGE UP (ref 0–0.9)
LYMPHOCYTES # BLD AUTO: 1.43 K/UL — SIGNIFICANT CHANGE UP (ref 1–3.3)
LYMPHOCYTES # BLD AUTO: 23.6 % — SIGNIFICANT CHANGE UP (ref 13–44)
MCHC RBC-ENTMCNC: 26.5 PG — LOW (ref 27–34)
MCHC RBC-ENTMCNC: 31.5 G/DL — LOW (ref 32–36)
MCV RBC AUTO: 83.7 FL — SIGNIFICANT CHANGE UP (ref 80–100)
MONOCYTES # BLD AUTO: 0.53 K/UL — SIGNIFICANT CHANGE UP (ref 0–0.9)
MONOCYTES NFR BLD AUTO: 8.8 % — SIGNIFICANT CHANGE UP (ref 2–14)
NEUTROPHILS # BLD AUTO: 3.71 K/UL — SIGNIFICANT CHANGE UP (ref 1.8–7.4)
NEUTROPHILS NFR BLD AUTO: 61.3 % — SIGNIFICANT CHANGE UP (ref 43–77)
NRBC # BLD: 0 /100 WBCS — SIGNIFICANT CHANGE UP (ref 0–0)
NRBC BLD-RTO: 0 /100 WBCS — SIGNIFICANT CHANGE UP (ref 0–0)
PLATELET # BLD AUTO: 142 K/UL — LOW (ref 150–400)
RBC # BLD: 5.33 M/UL — SIGNIFICANT CHANGE UP (ref 4.2–5.8)
RBC # FLD: 20.8 % — HIGH (ref 10.3–14.5)
WBC # BLD: 6.05 K/UL — SIGNIFICANT CHANGE UP (ref 3.8–10.5)
WBC # FLD AUTO: 6.05 K/UL — SIGNIFICANT CHANGE UP (ref 3.8–10.5)

## 2024-11-12 PROCEDURE — 99214 OFFICE O/P EST MOD 30 MIN: CPT

## 2024-11-13 LAB
AFP-TM SERPL-MCNC: 5.9 NG/ML
ALBUMIN SERPL ELPH-MCNC: 4.1 G/DL
ALP BLD-CCNC: 134 U/L
ALT SERPL-CCNC: 17 U/L
ANION GAP SERPL CALC-SCNC: 13 MMOL/L
AST SERPL-CCNC: 20 U/L
BILIRUB SERPL-MCNC: 0.6 MG/DL
BUN SERPL-MCNC: 17 MG/DL
CALCIUM SERPL-MCNC: 9.5 MG/DL
CHLORIDE SERPL-SCNC: 102 MMOL/L
CO2 SERPL-SCNC: 25 MMOL/L
CREAT SERPL-MCNC: 0.93 MG/DL
CREAT SPEC-SCNC: 41 MG/DL
CREAT/PROT UR: 0.3 RATIO
EGFR: 86 ML/MIN/1.73M2
GLUCOSE SERPL-MCNC: 126 MG/DL
POTASSIUM SERPL-SCNC: 4.4 MMOL/L
PROT SERPL-MCNC: 7.8 G/DL
PROT UR-MCNC: 11 MG/DL
SODIUM SERPL-SCNC: 140 MMOL/L

## 2024-11-15 ENCOUNTER — APPOINTMENT (OUTPATIENT)
Dept: OTOLARYNGOLOGY | Facility: CLINIC | Age: 74
End: 2024-11-15
Payer: MEDICARE

## 2024-11-15 VITALS — HEIGHT: 69 IN | BODY MASS INDEX: 29.92 KG/M2 | WEIGHT: 202 LBS

## 2024-11-15 DIAGNOSIS — J32.9 CHRONIC SINUSITIS, UNSPECIFIED: ICD-10-CM

## 2024-11-15 DIAGNOSIS — H61.21 IMPACTED CERUMEN, RIGHT EAR: ICD-10-CM

## 2024-11-15 DIAGNOSIS — R49.0 DYSPHONIA: ICD-10-CM

## 2024-11-15 DIAGNOSIS — J32.0 CHRONIC MAXILLARY SINUSITIS: ICD-10-CM

## 2024-11-15 PROCEDURE — 69210 REMOVE IMPACTED EAR WAX UNI: CPT

## 2024-11-15 PROCEDURE — 99214 OFFICE O/P EST MOD 30 MIN: CPT | Mod: 25

## 2024-11-15 PROCEDURE — 31231 NASAL ENDOSCOPY DX: CPT

## 2024-11-15 RX ORDER — AMOXICILLIN AND CLAVULANATE POTASSIUM 875; 125 MG/1; MG/1
875-125 TABLET, COATED ORAL TWICE DAILY
Qty: 28 | Refills: 0 | Status: ACTIVE | COMMUNITY
Start: 2024-11-15 | End: 1900-01-01

## 2024-11-18 ENCOUNTER — NON-APPOINTMENT (OUTPATIENT)
Age: 74
End: 2024-11-18

## 2024-11-19 ENCOUNTER — APPOINTMENT (OUTPATIENT)
Dept: OTOLARYNGOLOGY | Facility: CLINIC | Age: 74
End: 2024-11-19
Payer: MEDICARE

## 2024-11-19 VITALS — BODY MASS INDEX: 29.92 KG/M2 | WEIGHT: 202 LBS | HEIGHT: 69 IN

## 2024-11-19 DIAGNOSIS — H72.02 CENTRAL PERFORATION OF TYMPANIC MEMBRANE, LEFT EAR: ICD-10-CM

## 2024-11-19 DIAGNOSIS — H61.303 ACQUIRED STENOSIS OF EXTERNAL EAR CANAL, UNSPECIFIED, BILATERAL: ICD-10-CM

## 2024-11-19 DIAGNOSIS — H92.11 OTORRHEA, RIGHT EAR: ICD-10-CM

## 2024-11-19 DIAGNOSIS — H90.3 SENSORINEURAL HEARING LOSS, BILATERAL: ICD-10-CM

## 2024-11-19 PROCEDURE — 92567 TYMPANOMETRY: CPT

## 2024-11-19 PROCEDURE — 99214 OFFICE O/P EST MOD 30 MIN: CPT | Mod: 25

## 2024-11-19 PROCEDURE — 92557 COMPREHENSIVE HEARING TEST: CPT

## 2024-11-19 PROCEDURE — 92504 EAR MICROSCOPY EXAMINATION: CPT

## 2024-11-20 PROCEDURE — 99285 EMERGENCY DEPT VISIT HI MDM: CPT | Mod: 25

## 2024-11-20 PROCEDURE — 85025 COMPLETE CBC W/AUTO DIFF WBC: CPT

## 2024-11-20 PROCEDURE — 85610 PROTHROMBIN TIME: CPT

## 2024-11-20 PROCEDURE — 81001 URINALYSIS AUTO W/SCOPE: CPT

## 2024-11-20 PROCEDURE — 74177 CT ABD & PELVIS W/CONTRAST: CPT | Mod: MC

## 2024-11-20 PROCEDURE — 83690 ASSAY OF LIPASE: CPT

## 2024-11-20 PROCEDURE — 87086 URINE CULTURE/COLONY COUNT: CPT

## 2024-11-20 PROCEDURE — 80053 COMPREHEN METABOLIC PANEL: CPT

## 2024-11-20 PROCEDURE — 93005 ELECTROCARDIOGRAM TRACING: CPT

## 2024-11-20 PROCEDURE — 36415 COLL VENOUS BLD VENIPUNCTURE: CPT

## 2024-11-20 PROCEDURE — 96374 THER/PROPH/DIAG INJ IV PUSH: CPT | Mod: XU

## 2024-11-20 PROCEDURE — 71275 CT ANGIOGRAPHY CHEST: CPT | Mod: MC

## 2024-11-20 PROCEDURE — 85730 THROMBOPLASTIN TIME PARTIAL: CPT

## 2024-11-21 ENCOUNTER — NON-APPOINTMENT (OUTPATIENT)
Age: 74
End: 2024-11-21

## 2024-11-21 LAB — EAR NOSE AND THROAT CULTURE: ABNORMAL

## 2024-11-21 RX ORDER — CIPROFLOXACIN HYDROCHLORIDE 500 MG/1
500 TABLET, FILM COATED ORAL
Qty: 20 | Refills: 0 | Status: ACTIVE | COMMUNITY
Start: 2024-11-21 | End: 1900-01-01

## 2024-11-26 ENCOUNTER — RX RENEWAL (OUTPATIENT)
Age: 74
End: 2024-11-26

## 2024-11-26 ENCOUNTER — APPOINTMENT (OUTPATIENT)
Dept: OTOLARYNGOLOGY | Facility: CLINIC | Age: 74
End: 2024-11-26
Payer: MEDICARE

## 2024-11-26 VITALS
OXYGEN SATURATION: 96 % | WEIGHT: 198 LBS | SYSTOLIC BLOOD PRESSURE: 145 MMHG | HEIGHT: 69 IN | HEART RATE: 70 BPM | DIASTOLIC BLOOD PRESSURE: 85 MMHG | BODY MASS INDEX: 29.33 KG/M2

## 2024-11-26 DIAGNOSIS — C22.9 MALIGNANT NEOPLASM OF LIVER, NOT SPECIFIED AS PRIMARY OR SECONDARY: ICD-10-CM

## 2024-11-26 DIAGNOSIS — K21.9 GASTRO-ESOPHAGEAL REFLUX DISEASE W/OUT ESOPHAGITIS: ICD-10-CM

## 2024-11-26 DIAGNOSIS — R49.0 DYSPHONIA: ICD-10-CM

## 2024-11-26 PROCEDURE — 99213 OFFICE O/P EST LOW 20 MIN: CPT | Mod: 25

## 2024-11-26 PROCEDURE — 31579 LARYNGOSCOPY TELESCOPIC: CPT

## 2024-12-03 ENCOUNTER — APPOINTMENT (OUTPATIENT)
Dept: OTOLARYNGOLOGY | Facility: CLINIC | Age: 74
End: 2024-12-03
Payer: MEDICARE

## 2024-12-03 PROCEDURE — 92524 BEHAVRAL QUALIT ANALYS VOICE: CPT | Mod: GN

## 2024-12-10 ENCOUNTER — APPOINTMENT (OUTPATIENT)
Dept: HEPATOLOGY | Facility: CLINIC | Age: 74
End: 2024-12-10
Payer: MEDICARE

## 2024-12-10 VITALS
WEIGHT: 198 LBS | OXYGEN SATURATION: 98 % | SYSTOLIC BLOOD PRESSURE: 153 MMHG | BODY MASS INDEX: 29.33 KG/M2 | TEMPERATURE: 98.3 F | HEIGHT: 69 IN | RESPIRATION RATE: 16 BRPM | DIASTOLIC BLOOD PRESSURE: 85 MMHG | HEART RATE: 72 BPM

## 2024-12-10 DIAGNOSIS — R54 AGE-RELATED PHYSICAL DEBILITY: ICD-10-CM

## 2024-12-10 DIAGNOSIS — B18.1 CHRONIC VIRAL HEPATITIS B W/OUT DELTA-AGENT: ICD-10-CM

## 2024-12-10 PROCEDURE — 99215 OFFICE O/P EST HI 40 MIN: CPT

## 2024-12-10 NOTE — ED ADULT NURSE NOTE - NS ED NURSE REPORT GIVEN DT
17-Dec-2019 23:40

## 2024-12-11 ENCOUNTER — APPOINTMENT (OUTPATIENT)
Dept: HEMATOLOGY ONCOLOGY | Facility: CLINIC | Age: 74
End: 2024-12-11

## 2024-12-11 VITALS
WEIGHT: 195.97 LBS | TEMPERATURE: 97.1 F | HEIGHT: 69 IN | HEART RATE: 60 BPM | SYSTOLIC BLOOD PRESSURE: 129 MMHG | OXYGEN SATURATION: 98 % | RESPIRATION RATE: 16 BRPM | DIASTOLIC BLOOD PRESSURE: 76 MMHG | BODY MASS INDEX: 29.03 KG/M2

## 2024-12-11 DIAGNOSIS — C22.9 MALIGNANT NEOPLASM OF LIVER, NOT SPECIFIED AS PRIMARY OR SECONDARY: ICD-10-CM

## 2024-12-11 PROCEDURE — G2211 COMPLEX E/M VISIT ADD ON: CPT

## 2024-12-11 PROCEDURE — 99213 OFFICE O/P EST LOW 20 MIN: CPT

## 2024-12-12 ENCOUNTER — APPOINTMENT (OUTPATIENT)
Dept: OTOLARYNGOLOGY | Facility: CLINIC | Age: 74
End: 2024-12-12
Payer: MEDICARE

## 2024-12-12 PROCEDURE — 92507 TX SP LANG VOICE COMM INDIV: CPT | Mod: GN

## 2024-12-20 ENCOUNTER — APPOINTMENT (OUTPATIENT)
Dept: OTOLARYNGOLOGY | Facility: CLINIC | Age: 74
End: 2024-12-20
Payer: MEDICARE

## 2024-12-20 VITALS — HEIGHT: 69 IN | WEIGHT: 195 LBS | BODY MASS INDEX: 28.88 KG/M2

## 2024-12-20 DIAGNOSIS — R49.0 DYSPHONIA: ICD-10-CM

## 2024-12-20 DIAGNOSIS — J32.0 CHRONIC MAXILLARY SINUSITIS: ICD-10-CM

## 2024-12-20 PROCEDURE — 31231 NASAL ENDOSCOPY DX: CPT

## 2024-12-20 PROCEDURE — 99214 OFFICE O/P EST MOD 30 MIN: CPT | Mod: 25

## 2024-12-23 ENCOUNTER — OUTPATIENT (OUTPATIENT)
Dept: OUTPATIENT SERVICES | Facility: HOSPITAL | Age: 74
LOS: 1 days | End: 2024-12-23
Payer: MEDICARE

## 2024-12-23 ENCOUNTER — APPOINTMENT (OUTPATIENT)
Dept: CT IMAGING | Facility: CLINIC | Age: 74
End: 2024-12-23
Payer: MEDICARE

## 2024-12-23 ENCOUNTER — NON-APPOINTMENT (OUTPATIENT)
Age: 74
End: 2024-12-23

## 2024-12-23 DIAGNOSIS — C22.9 MALIGNANT NEOPLASM OF LIVER, NOT SPECIFIED AS PRIMARY OR SECONDARY: ICD-10-CM

## 2024-12-23 DIAGNOSIS — Z98.890 OTHER SPECIFIED POSTPROCEDURAL STATES: Chronic | ICD-10-CM

## 2024-12-23 DIAGNOSIS — Z98.49 CATARACT EXTRACTION STATUS, UNSPECIFIED EYE: Chronic | ICD-10-CM

## 2024-12-23 DIAGNOSIS — Z95.1 PRESENCE OF AORTOCORONARY BYPASS GRAFT: Chronic | ICD-10-CM

## 2024-12-23 PROCEDURE — 71260 CT THORAX DX C+: CPT

## 2024-12-23 PROCEDURE — 74177 CT ABD & PELVIS W/CONTRAST: CPT

## 2024-12-23 PROCEDURE — 74177 CT ABD & PELVIS W/CONTRAST: CPT | Mod: 26,MH

## 2024-12-23 PROCEDURE — 71260 CT THORAX DX C+: CPT | Mod: 26,MH

## 2024-12-26 ENCOUNTER — NON-APPOINTMENT (OUTPATIENT)
Age: 74
End: 2024-12-26

## 2024-12-26 ENCOUNTER — APPOINTMENT (OUTPATIENT)
Dept: PHARMACY | Facility: CLINIC | Age: 74
End: 2024-12-26
Payer: SELF-PAY

## 2024-12-26 LAB — EAR NOSE AND THROAT CULTURE: ABNORMAL

## 2024-12-26 PROCEDURE — V5010 ASSESSMENT FOR HEARING AID: CPT | Mod: NC

## 2024-12-27 ENCOUNTER — APPOINTMENT (OUTPATIENT)
Dept: OTOLARYNGOLOGY | Facility: CLINIC | Age: 74
End: 2024-12-27
Payer: MEDICARE

## 2024-12-27 PROCEDURE — 92507 TX SP LANG VOICE COMM INDIV: CPT | Mod: GN

## 2024-12-28 PROBLEM — C22.0 HCC (HEPATOCELLULAR CARCINOMA): Status: ACTIVE | Noted: 2023-04-24

## 2025-01-02 ENCOUNTER — RESULT REVIEW (OUTPATIENT)
Age: 75
End: 2025-01-02

## 2025-01-02 ENCOUNTER — NON-APPOINTMENT (OUTPATIENT)
Age: 75
End: 2025-01-02

## 2025-01-02 ENCOUNTER — APPOINTMENT (OUTPATIENT)
Dept: HEMATOLOGY ONCOLOGY | Facility: CLINIC | Age: 75
End: 2025-01-02
Payer: MEDICARE

## 2025-01-02 VITALS
HEART RATE: 73 BPM | HEIGHT: 69 IN | SYSTOLIC BLOOD PRESSURE: 130 MMHG | RESPIRATION RATE: 17 BRPM | DIASTOLIC BLOOD PRESSURE: 72 MMHG | TEMPERATURE: 97.4 F | OXYGEN SATURATION: 97 % | BODY MASS INDEX: 28.88 KG/M2 | WEIGHT: 195 LBS

## 2025-01-02 LAB
BASOPHILS # BLD AUTO: 0.03 K/UL — SIGNIFICANT CHANGE UP (ref 0–0.2)
BASOPHILS NFR BLD AUTO: 0.6 % — SIGNIFICANT CHANGE UP (ref 0–2)
EOSINOPHIL # BLD AUTO: 0.27 K/UL — SIGNIFICANT CHANGE UP (ref 0–0.5)
EOSINOPHIL NFR BLD AUTO: 5.2 % — SIGNIFICANT CHANGE UP (ref 0–6)
HCT VFR BLD CALC: 48.5 % — SIGNIFICANT CHANGE UP (ref 39–50)
HGB BLD-MCNC: 15.6 G/DL — SIGNIFICANT CHANGE UP (ref 13–17)
IMM GRANULOCYTES NFR BLD AUTO: 0.4 % — SIGNIFICANT CHANGE UP (ref 0–0.9)
LYMPHOCYTES # BLD AUTO: 1.37 K/UL — SIGNIFICANT CHANGE UP (ref 1–3.3)
LYMPHOCYTES # BLD AUTO: 26.2 % — SIGNIFICANT CHANGE UP (ref 13–44)
MCHC RBC-ENTMCNC: 28 PG — SIGNIFICANT CHANGE UP (ref 27–34)
MCHC RBC-ENTMCNC: 32.2 G/DL — SIGNIFICANT CHANGE UP (ref 32–36)
MCV RBC AUTO: 86.9 FL — SIGNIFICANT CHANGE UP (ref 80–100)
MONOCYTES # BLD AUTO: 0.4 K/UL — SIGNIFICANT CHANGE UP (ref 0–0.9)
MONOCYTES NFR BLD AUTO: 7.6 % — SIGNIFICANT CHANGE UP (ref 2–14)
NEUTROPHILS # BLD AUTO: 3.14 K/UL — SIGNIFICANT CHANGE UP (ref 1.8–7.4)
NEUTROPHILS NFR BLD AUTO: 60 % — SIGNIFICANT CHANGE UP (ref 43–77)
NRBC # BLD: 0 /100 WBCS — SIGNIFICANT CHANGE UP (ref 0–0)
NRBC BLD-RTO: 0 /100 WBCS — SIGNIFICANT CHANGE UP (ref 0–0)
PLATELET # BLD AUTO: 122 K/UL — LOW (ref 150–400)
RBC # BLD: 5.58 M/UL — SIGNIFICANT CHANGE UP (ref 4.2–5.8)
RBC # FLD: 15.6 % — HIGH (ref 10.3–14.5)
WBC # BLD: 5.23 K/UL — SIGNIFICANT CHANGE UP (ref 3.8–10.5)
WBC # FLD AUTO: 5.23 K/UL — SIGNIFICANT CHANGE UP (ref 3.8–10.5)

## 2025-01-02 PROCEDURE — 99213 OFFICE O/P EST LOW 20 MIN: CPT

## 2025-01-02 PROCEDURE — G2211 COMPLEX E/M VISIT ADD ON: CPT

## 2025-01-03 LAB
AFP-TM SERPL-MCNC: 5.3 NG/ML
ALBUMIN SERPL ELPH-MCNC: 4.1 G/DL
ALP BLD-CCNC: 136 U/L
ALT SERPL-CCNC: 37 U/L
ANION GAP SERPL CALC-SCNC: 10 MMOL/L
AST SERPL-CCNC: 34 U/L
BILIRUB SERPL-MCNC: 0.5 MG/DL
BUN SERPL-MCNC: 22 MG/DL
CALCIUM SERPL-MCNC: 9.9 MG/DL
CHLORIDE SERPL-SCNC: 100 MMOL/L
CO2 SERPL-SCNC: 26 MMOL/L
CREAT SERPL-MCNC: 0.94 MG/DL
EGFR: 85 ML/MIN/1.73M2
GLUCOSE SERPL-MCNC: 165 MG/DL
POTASSIUM SERPL-SCNC: 4.8 MMOL/L
PROT SERPL-MCNC: 7.7 G/DL
SODIUM SERPL-SCNC: 136 MMOL/L

## 2025-01-06 ENCOUNTER — APPOINTMENT (OUTPATIENT)
Dept: INTERVENTIONAL RADIOLOGY/VASCULAR | Facility: CLINIC | Age: 75
End: 2025-01-06

## 2025-01-06 DIAGNOSIS — B18.1 CHRONIC VIRAL HEPATITIS B W/OUT DELTA-AGENT: ICD-10-CM

## 2025-01-06 DIAGNOSIS — C22.0 LIVER CELL CARCINOMA: ICD-10-CM

## 2025-01-06 RX ORDER — SULFAMETHOXAZOLE AND TRIMETHOPRIM 800; 160 MG/1; MG/1
800-160 TABLET ORAL
Qty: 20 | Refills: 0 | Status: DISCONTINUED | COMMUNITY
Start: 2025-01-02 | End: 2025-01-06

## 2025-01-06 RX ORDER — SULFAMETHOXAZOLE AND TRIMETHOPRIM 400; 80 MG/1; MG/1
TABLET ORAL
Refills: 0 | Status: ACTIVE | COMMUNITY

## 2025-03-14 DIAGNOSIS — C22.0 LIVER CELL CARCINOMA: ICD-10-CM

## 2025-04-14 ENCOUNTER — APPOINTMENT (OUTPATIENT)
Dept: CT IMAGING | Facility: CLINIC | Age: 75
End: 2025-04-14
Payer: MEDICARE

## 2025-04-14 ENCOUNTER — OUTPATIENT (OUTPATIENT)
Dept: OUTPATIENT SERVICES | Facility: HOSPITAL | Age: 75
LOS: 1 days | End: 2025-04-14
Payer: MEDICARE

## 2025-04-14 DIAGNOSIS — Z95.1 PRESENCE OF AORTOCORONARY BYPASS GRAFT: Chronic | ICD-10-CM

## 2025-04-14 DIAGNOSIS — Z98.890 OTHER SPECIFIED POSTPROCEDURAL STATES: Chronic | ICD-10-CM

## 2025-04-14 DIAGNOSIS — Z98.49 CATARACT EXTRACTION STATUS, UNSPECIFIED EYE: Chronic | ICD-10-CM

## 2025-04-14 DIAGNOSIS — C22.0 LIVER CELL CARCINOMA: ICD-10-CM

## 2025-04-14 PROCEDURE — 71260 CT THORAX DX C+: CPT | Mod: 26

## 2025-04-14 PROCEDURE — 74177 CT ABD & PELVIS W/CONTRAST: CPT

## 2025-04-14 PROCEDURE — 74177 CT ABD & PELVIS W/CONTRAST: CPT | Mod: 26

## 2025-04-14 PROCEDURE — 71260 CT THORAX DX C+: CPT

## 2025-04-18 ENCOUNTER — OUTPATIENT (OUTPATIENT)
Dept: OUTPATIENT SERVICES | Facility: HOSPITAL | Age: 75
LOS: 1 days | Discharge: HOME | End: 2025-04-18

## 2025-04-18 DIAGNOSIS — Z95.1 PRESENCE OF AORTOCORONARY BYPASS GRAFT: Chronic | ICD-10-CM

## 2025-04-18 DIAGNOSIS — Z98.890 OTHER SPECIFIED POSTPROCEDURAL STATES: Chronic | ICD-10-CM

## 2025-04-18 DIAGNOSIS — Z98.49 CATARACT EXTRACTION STATUS, UNSPECIFIED EYE: Chronic | ICD-10-CM

## 2025-04-18 DIAGNOSIS — C22.0 LIVER CELL CARCINOMA: ICD-10-CM

## 2025-04-21 ENCOUNTER — RESULT REVIEW (OUTPATIENT)
Age: 75
End: 2025-04-21

## 2025-04-21 ENCOUNTER — APPOINTMENT (OUTPATIENT)
Dept: HEMATOLOGY ONCOLOGY | Facility: CLINIC | Age: 75
End: 2025-04-21

## 2025-04-21 VITALS
TEMPERATURE: 98.1 F | SYSTOLIC BLOOD PRESSURE: 144 MMHG | WEIGHT: 190.7 LBS | DIASTOLIC BLOOD PRESSURE: 78 MMHG | BODY MASS INDEX: 27.61 KG/M2 | OXYGEN SATURATION: 96 % | HEIGHT: 69.69 IN | HEART RATE: 59 BPM | RESPIRATION RATE: 15 BRPM

## 2025-04-21 DIAGNOSIS — C22.0 LIVER CELL CARCINOMA: ICD-10-CM

## 2025-04-21 LAB
BASOPHILS # BLD AUTO: 0.04 K/UL — SIGNIFICANT CHANGE UP (ref 0–0.2)
BASOPHILS NFR BLD AUTO: 0.6 % — SIGNIFICANT CHANGE UP (ref 0–2)
EOSINOPHIL # BLD AUTO: 0.34 K/UL — SIGNIFICANT CHANGE UP (ref 0–0.5)
EOSINOPHIL NFR BLD AUTO: 4.9 % — SIGNIFICANT CHANGE UP (ref 0–6)
HCT VFR BLD CALC: 48.3 % — SIGNIFICANT CHANGE UP (ref 39–50)
HGB BLD-MCNC: 15.8 G/DL — SIGNIFICANT CHANGE UP (ref 13–17)
IMM GRANULOCYTES NFR BLD AUTO: 0.3 % — SIGNIFICANT CHANGE UP (ref 0–0.9)
LYMPHOCYTES # BLD AUTO: 1.83 K/UL — SIGNIFICANT CHANGE UP (ref 1–3.3)
LYMPHOCYTES # BLD AUTO: 26.3 % — SIGNIFICANT CHANGE UP (ref 13–44)
MCHC RBC-ENTMCNC: 28.9 PG — SIGNIFICANT CHANGE UP (ref 27–34)
MCHC RBC-ENTMCNC: 32.7 G/DL — SIGNIFICANT CHANGE UP (ref 32–36)
MCV RBC AUTO: 88.5 FL — SIGNIFICANT CHANGE UP (ref 80–100)
MONOCYTES # BLD AUTO: 0.56 K/UL — SIGNIFICANT CHANGE UP (ref 0–0.9)
MONOCYTES NFR BLD AUTO: 8.1 % — SIGNIFICANT CHANGE UP (ref 2–14)
NEUTROPHILS # BLD AUTO: 4.16 K/UL — SIGNIFICANT CHANGE UP (ref 1.8–7.4)
NEUTROPHILS NFR BLD AUTO: 59.8 % — SIGNIFICANT CHANGE UP (ref 43–77)
NRBC BLD AUTO-RTO: 0 /100 WBCS — SIGNIFICANT CHANGE UP (ref 0–0)
PLATELET # BLD AUTO: 138 K/UL — LOW (ref 150–400)
RBC # BLD: 5.46 M/UL — SIGNIFICANT CHANGE UP (ref 4.2–5.8)
RBC # FLD: 14.7 % — HIGH (ref 10.3–14.5)
WBC # BLD: 6.95 K/UL — SIGNIFICANT CHANGE UP (ref 3.8–10.5)
WBC # FLD AUTO: 6.95 K/UL — SIGNIFICANT CHANGE UP (ref 3.8–10.5)

## 2025-04-21 PROCEDURE — 99213 OFFICE O/P EST LOW 20 MIN: CPT

## 2025-04-21 PROCEDURE — G2211 COMPLEX E/M VISIT ADD ON: CPT

## 2025-04-23 LAB
AFP-TM SERPL-MCNC: 6.9 NG/ML
ALBUMIN SERPL ELPH-MCNC: 3.9 G/DL
ALP BLD-CCNC: 111 U/L
ALT SERPL-CCNC: 26 U/L
ANION GAP SERPL CALC-SCNC: 13 MMOL/L
AST SERPL-CCNC: 32 U/L
BILIRUB SERPL-MCNC: 0.5 MG/DL
BUN SERPL-MCNC: 17 MG/DL
CALCIUM SERPL-MCNC: 9.5 MG/DL
CHLORIDE SERPL-SCNC: 105 MMOL/L
CO2 SERPL-SCNC: 24 MMOL/L
CREAT SERPL-MCNC: 0.86 MG/DL
EGFRCR SERPLBLD CKD-EPI 2021: 90 ML/MIN/1.73M2
GLUCOSE SERPL-MCNC: 123 MG/DL
MAGNESIUM SERPL-MCNC: 2.1 MG/DL
POTASSIUM SERPL-SCNC: 4.9 MMOL/L
PROT SERPL-MCNC: 7.3 G/DL
SODIUM SERPL-SCNC: 142 MMOL/L

## 2025-04-30 ENCOUNTER — APPOINTMENT (OUTPATIENT)
Dept: PHARMACY | Facility: CLINIC | Age: 75
End: 2025-04-30
Payer: SELF-PAY

## 2025-04-30 PROCEDURE — V5010 ASSESSMENT FOR HEARING AID: CPT | Mod: NC

## 2025-05-02 ENCOUNTER — APPOINTMENT (OUTPATIENT)
Dept: OTOLARYNGOLOGY | Facility: CLINIC | Age: 75
End: 2025-05-02
Payer: MEDICARE

## 2025-05-02 VITALS
HEIGHT: 69 IN | BODY MASS INDEX: 28.14 KG/M2 | HEART RATE: 61 BPM | WEIGHT: 190 LBS | SYSTOLIC BLOOD PRESSURE: 118 MMHG | DIASTOLIC BLOOD PRESSURE: 68 MMHG

## 2025-05-02 DIAGNOSIS — R49.0 DYSPHONIA: ICD-10-CM

## 2025-05-02 DIAGNOSIS — J32.0 CHRONIC MAXILLARY SINUSITIS: ICD-10-CM

## 2025-05-02 PROCEDURE — 31231 NASAL ENDOSCOPY DX: CPT

## 2025-05-02 PROCEDURE — 99213 OFFICE O/P EST LOW 20 MIN: CPT | Mod: 25

## 2025-05-05 ENCOUNTER — RESULT REVIEW (OUTPATIENT)
Age: 75
End: 2025-05-05

## 2025-05-05 ENCOUNTER — LABORATORY RESULT (OUTPATIENT)
Age: 75
End: 2025-05-05

## 2025-05-05 ENCOUNTER — APPOINTMENT (OUTPATIENT)
Dept: HEMATOLOGY ONCOLOGY | Facility: CLINIC | Age: 75
End: 2025-05-05
Payer: MEDICARE

## 2025-05-05 VITALS
OXYGEN SATURATION: 96 % | WEIGHT: 191.8 LBS | TEMPERATURE: 97.1 F | HEART RATE: 53 BPM | BODY MASS INDEX: 28.09 KG/M2 | DIASTOLIC BLOOD PRESSURE: 76 MMHG | HEIGHT: 69.29 IN | RESPIRATION RATE: 15 BRPM | SYSTOLIC BLOOD PRESSURE: 144 MMHG

## 2025-05-05 LAB
AFP-TM SERPL-MCNC: 7.7 NG/ML
ALBUMIN SERPL ELPH-MCNC: 4 G/DL
ALP BLD-CCNC: 118 U/L
ALT SERPL-CCNC: 36 U/L
ANION GAP SERPL CALC-SCNC: 14 MMOL/L
AST SERPL-CCNC: 35 U/L
BASOPHILS # BLD AUTO: 0.04 K/UL — SIGNIFICANT CHANGE UP (ref 0–0.2)
BASOPHILS NFR BLD AUTO: 0.6 % — SIGNIFICANT CHANGE UP (ref 0–2)
BILIRUB SERPL-MCNC: 0.7 MG/DL
BUN SERPL-MCNC: 21 MG/DL
CALCIUM SERPL-MCNC: 9.4 MG/DL
CHLORIDE SERPL-SCNC: 101 MMOL/L
CO2 SERPL-SCNC: 23 MMOL/L
CREAT SERPL-MCNC: 0.9 MG/DL
EGFRCR SERPLBLD CKD-EPI 2021: 89 ML/MIN/1.73M2
EOSINOPHIL # BLD AUTO: 0.35 K/UL — SIGNIFICANT CHANGE UP (ref 0–0.5)
EOSINOPHIL NFR BLD AUTO: 5.3 % — SIGNIFICANT CHANGE UP (ref 0–6)
GLUCOSE SERPL-MCNC: 164 MG/DL
HCT VFR BLD CALC: 47.7 % — SIGNIFICANT CHANGE UP (ref 39–50)
HGB BLD-MCNC: 15.4 G/DL — SIGNIFICANT CHANGE UP (ref 13–17)
IMM GRANULOCYTES NFR BLD AUTO: 0.3 % — SIGNIFICANT CHANGE UP (ref 0–0.9)
LYMPHOCYTES # BLD AUTO: 1.84 K/UL — SIGNIFICANT CHANGE UP (ref 1–3.3)
LYMPHOCYTES # BLD AUTO: 27.6 % — SIGNIFICANT CHANGE UP (ref 13–44)
MAGNESIUM SERPL-MCNC: 2.1 MG/DL
MCHC RBC-ENTMCNC: 28.8 PG — SIGNIFICANT CHANGE UP (ref 27–34)
MCHC RBC-ENTMCNC: 32.3 G/DL — SIGNIFICANT CHANGE UP (ref 32–36)
MCV RBC AUTO: 89.2 FL — SIGNIFICANT CHANGE UP (ref 80–100)
MONOCYTES # BLD AUTO: 0.52 K/UL — SIGNIFICANT CHANGE UP (ref 0–0.9)
MONOCYTES NFR BLD AUTO: 7.8 % — SIGNIFICANT CHANGE UP (ref 2–14)
NEUTROPHILS # BLD AUTO: 3.89 K/UL — SIGNIFICANT CHANGE UP (ref 1.8–7.4)
NEUTROPHILS NFR BLD AUTO: 58.4 % — SIGNIFICANT CHANGE UP (ref 43–77)
NRBC BLD AUTO-RTO: 0 /100 WBCS — SIGNIFICANT CHANGE UP (ref 0–0)
PLATELET # BLD AUTO: 138 K/UL — LOW (ref 150–400)
POTASSIUM SERPL-SCNC: 4.9 MMOL/L
PROT SERPL-MCNC: 7.3 G/DL
RBC # BLD: 5.35 M/UL — SIGNIFICANT CHANGE UP (ref 4.2–5.8)
RBC # FLD: 14.6 % — HIGH (ref 10.3–14.5)
SODIUM SERPL-SCNC: 138 MMOL/L
TSH SERPL-ACNC: 4.43 UIU/ML
VIT B12 SERPL-MCNC: 557 PG/ML
WBC # BLD: 6.66 K/UL — SIGNIFICANT CHANGE UP (ref 3.8–10.5)
WBC # FLD AUTO: 6.66 K/UL — SIGNIFICANT CHANGE UP (ref 3.8–10.5)

## 2025-05-05 PROCEDURE — 99213 OFFICE O/P EST LOW 20 MIN: CPT

## 2025-05-15 ENCOUNTER — RX RENEWAL (OUTPATIENT)
Age: 75
End: 2025-05-15

## 2025-05-21 ENCOUNTER — APPOINTMENT (OUTPATIENT)
Dept: PHARMACY | Facility: CLINIC | Age: 75
End: 2025-05-21
Payer: SELF-PAY

## 2025-05-21 ENCOUNTER — APPOINTMENT (OUTPATIENT)
Dept: OTOLARYNGOLOGY | Facility: CLINIC | Age: 75
End: 2025-05-21
Payer: MEDICARE

## 2025-05-21 VITALS — BODY MASS INDEX: 28.44 KG/M2 | HEIGHT: 69 IN | WEIGHT: 192 LBS

## 2025-05-21 DIAGNOSIS — H90.3 SENSORINEURAL HEARING LOSS, BILATERAL: ICD-10-CM

## 2025-05-21 DIAGNOSIS — H61.303 ACQUIRED STENOSIS OF EXTERNAL EAR CANAL, UNSPECIFIED, BILATERAL: ICD-10-CM

## 2025-05-21 DIAGNOSIS — Z91.09 OTHER ALLERGY STATUS, OTHER THAN TO DRUGS AND BIOLOGICAL SUBSTANCES: ICD-10-CM

## 2025-05-21 DIAGNOSIS — H61.23 IMPACTED CERUMEN, BILATERAL: ICD-10-CM

## 2025-05-21 DIAGNOSIS — H90.0 CONDUCTIVE HEARING LOSS, BILATERAL: ICD-10-CM

## 2025-05-21 PROCEDURE — V5299A: CUSTOM

## 2025-05-21 PROCEDURE — 99213 OFFICE O/P EST LOW 20 MIN: CPT

## 2025-05-21 PROCEDURE — 92557 COMPREHENSIVE HEARING TEST: CPT

## 2025-05-21 PROCEDURE — 92567 TYMPANOMETRY: CPT

## 2025-06-02 ENCOUNTER — OUTPATIENT (OUTPATIENT)
Dept: OUTPATIENT SERVICES | Facility: HOSPITAL | Age: 75
LOS: 1 days | End: 2025-06-02
Payer: MEDICARE

## 2025-06-02 ENCOUNTER — APPOINTMENT (OUTPATIENT)
Dept: CT IMAGING | Facility: CLINIC | Age: 75
End: 2025-06-02
Payer: MEDICARE

## 2025-06-02 DIAGNOSIS — Z98.49 CATARACT EXTRACTION STATUS, UNSPECIFIED EYE: Chronic | ICD-10-CM

## 2025-06-02 DIAGNOSIS — Z98.890 OTHER SPECIFIED POSTPROCEDURAL STATES: Chronic | ICD-10-CM

## 2025-06-02 DIAGNOSIS — Z95.1 PRESENCE OF AORTOCORONARY BYPASS GRAFT: Chronic | ICD-10-CM

## 2025-06-02 DIAGNOSIS — C22.0 LIVER CELL CARCINOMA: ICD-10-CM

## 2025-06-02 PROCEDURE — 74177 CT ABD & PELVIS W/CONTRAST: CPT

## 2025-06-02 PROCEDURE — 71260 CT THORAX DX C+: CPT

## 2025-06-02 PROCEDURE — 71260 CT THORAX DX C+: CPT | Mod: 26

## 2025-06-02 PROCEDURE — 74177 CT ABD & PELVIS W/CONTRAST: CPT | Mod: 26

## 2025-06-09 ENCOUNTER — APPOINTMENT (OUTPATIENT)
Dept: HEMATOLOGY ONCOLOGY | Facility: CLINIC | Age: 75
End: 2025-06-09
Payer: MEDICARE

## 2025-06-09 VITALS
WEIGHT: 190.9 LBS | OXYGEN SATURATION: 97 % | SYSTOLIC BLOOD PRESSURE: 168 MMHG | RESPIRATION RATE: 17 BRPM | TEMPERATURE: 97.3 F | HEART RATE: 51 BPM | BODY MASS INDEX: 28.19 KG/M2 | DIASTOLIC BLOOD PRESSURE: 75 MMHG

## 2025-06-09 PROCEDURE — 99213 OFFICE O/P EST LOW 20 MIN: CPT

## 2025-06-09 PROCEDURE — G2211 COMPLEX E/M VISIT ADD ON: CPT

## 2025-06-11 ENCOUNTER — LABORATORY RESULT (OUTPATIENT)
Age: 75
End: 2025-06-11

## 2025-06-11 LAB
AFP-TM SERPL-MCNC: 9.7 NG/ML
ALBUMIN SERPL ELPH-MCNC: 4.1 G/DL
ALP BLD-CCNC: 132 U/L
ALT SERPL-CCNC: 38 U/L
ANION GAP SERPL CALC-SCNC: 13 MMOL/L
AST SERPL-CCNC: 36 U/L
BILIRUB SERPL-MCNC: 0.6 MG/DL
BUN SERPL-MCNC: 16 MG/DL
CALCIUM SERPL-MCNC: 9.1 MG/DL
CHLORIDE SERPL-SCNC: 103 MMOL/L
CO2 SERPL-SCNC: 22 MMOL/L
CREAT SERPL-MCNC: 0.9 MG/DL
EGFRCR SERPLBLD CKD-EPI 2021: 89 ML/MIN/1.73M2
GLUCOSE SERPL-MCNC: 143 MG/DL
POTASSIUM SERPL-SCNC: 4.6 MMOL/L
PROT SERPL-MCNC: 7.3 G/DL
SODIUM SERPL-SCNC: 138 MMOL/L

## 2025-06-13 ENCOUNTER — APPOINTMENT (OUTPATIENT)
Dept: INTERVENTIONAL RADIOLOGY/VASCULAR | Facility: CLINIC | Age: 75
End: 2025-06-13

## 2025-06-18 ENCOUNTER — NON-APPOINTMENT (OUTPATIENT)
Age: 75
End: 2025-06-18

## 2025-06-20 LAB
ALBUMIN SERPL ELPH-MCNC: 4.1 G/DL
ALP BLD-CCNC: 119 U/L
ALT SERPL-CCNC: 36 U/L
ANION GAP SERPL CALC-SCNC: 14 MMOL/L
APPEARANCE: CLEAR
AST SERPL-CCNC: 32 U/L
BACTERIA: NEGATIVE /HPF
BASOPHILS # BLD AUTO: 0.04 K/UL
BASOPHILS NFR BLD AUTO: 0.5 %
BILIRUB SERPL-MCNC: 0.8 MG/DL
BILIRUBIN URINE: NEGATIVE
BLOOD URINE: NEGATIVE
BUN SERPL-MCNC: 15 MG/DL
CALCIUM SERPL-MCNC: 9.2 MG/DL
CAST: 5 /LPF
CHLORIDE SERPL-SCNC: 101 MMOL/L
CO2 SERPL-SCNC: 23 MMOL/L
COLOR: YELLOW
CREAT SERPL-MCNC: 1 MG/DL
EGFRCR SERPLBLD CKD-EPI 2021: 78 ML/MIN/1.73M2
EOSINOPHIL # BLD AUTO: 0.34 K/UL
EOSINOPHIL NFR BLD AUTO: 4.4 %
EPITHELIAL CELLS: 1 /HPF
GLUCOSE QUALITATIVE U: NEGATIVE MG/DL
GLUCOSE SERPL-MCNC: 164 MG/DL
HCT VFR BLD CALC: 52 %
HGB BLD-MCNC: 16.5 G/DL
IMM GRANULOCYTES NFR BLD AUTO: 0.1 %
KETONES URINE: NEGATIVE MG/DL
LEUKOCYTE ESTERASE URINE: NEGATIVE
LYMPHOCYTES # BLD AUTO: 2.51 K/UL
LYMPHOCYTES NFR BLD AUTO: 32.6 %
MAN DIFF?: NORMAL
MCHC RBC-ENTMCNC: 28.3 PG
MCHC RBC-ENTMCNC: 31.7 G/DL
MCV RBC AUTO: 89.2 FL
MICROSCOPIC-UA: NORMAL
MONOCYTES # BLD AUTO: 0.53 K/UL
MONOCYTES NFR BLD AUTO: 6.9 %
NEUTROPHILS # BLD AUTO: 4.27 K/UL
NEUTROPHILS NFR BLD AUTO: 55.5 %
NITRITE URINE: NEGATIVE
PH URINE: 6
PLATELET # BLD AUTO: 124 K/UL
POTASSIUM SERPL-SCNC: 4.3 MMOL/L
PROT SERPL-MCNC: 7.3 G/DL
PROTEIN URINE: NORMAL MG/DL
RBC # BLD: 5.83 M/UL
RBC # FLD: 14.5 %
RED BLOOD CELLS URINE: 2 /HPF
SODIUM SERPL-SCNC: 137 MMOL/L
SPECIFIC GRAVITY URINE: 1.02
UROBILINOGEN URINE: 0.2 MG/DL
WBC # FLD AUTO: 7.7 K/UL
WHITE BLOOD CELLS URINE: 1 /HPF

## 2025-07-12 ENCOUNTER — OUTPATIENT (OUTPATIENT)
Dept: OUTPATIENT SERVICES | Facility: HOSPITAL | Age: 75
LOS: 1 days | Discharge: HOME | End: 2025-07-12

## 2025-07-12 DIAGNOSIS — Z98.49 CATARACT EXTRACTION STATUS, UNSPECIFIED EYE: Chronic | ICD-10-CM

## 2025-07-12 DIAGNOSIS — Z98.890 OTHER SPECIFIED POSTPROCEDURAL STATES: Chronic | ICD-10-CM

## 2025-07-12 DIAGNOSIS — C22.0 LIVER CELL CARCINOMA: ICD-10-CM

## 2025-07-12 DIAGNOSIS — Z95.1 PRESENCE OF AORTOCORONARY BYPASS GRAFT: Chronic | ICD-10-CM

## 2025-07-14 ENCOUNTER — APPOINTMENT (OUTPATIENT)
Dept: HEMATOLOGY ONCOLOGY | Facility: CLINIC | Age: 75
End: 2025-07-14
Payer: MEDICARE

## 2025-07-14 ENCOUNTER — RESULT REVIEW (OUTPATIENT)
Age: 75
End: 2025-07-14

## 2025-07-14 VITALS
WEIGHT: 190.48 LBS | RESPIRATION RATE: 18 BRPM | SYSTOLIC BLOOD PRESSURE: 122 MMHG | HEART RATE: 53 BPM | BODY MASS INDEX: 28.13 KG/M2 | TEMPERATURE: 98 F | OXYGEN SATURATION: 97 % | DIASTOLIC BLOOD PRESSURE: 77 MMHG

## 2025-07-14 PROBLEM — R19.7 DIARRHEA: Status: ACTIVE | Noted: 2025-07-14

## 2025-07-14 LAB
BASOPHILS # BLD AUTO: 0.03 K/UL — SIGNIFICANT CHANGE UP (ref 0–0.2)
BASOPHILS NFR BLD AUTO: 0.4 % — SIGNIFICANT CHANGE UP (ref 0–2)
EOSINOPHIL # BLD AUTO: 0.22 K/UL — SIGNIFICANT CHANGE UP (ref 0–0.5)
EOSINOPHIL NFR BLD AUTO: 3.1 % — SIGNIFICANT CHANGE UP (ref 0–6)
HCT VFR BLD CALC: 46.6 % — SIGNIFICANT CHANGE UP (ref 39–50)
HGB BLD-MCNC: 14.9 G/DL — SIGNIFICANT CHANGE UP (ref 13–17)
IMM GRANULOCYTES NFR BLD AUTO: 0.1 % — SIGNIFICANT CHANGE UP (ref 0–0.9)
LYMPHOCYTES # BLD AUTO: 2.01 K/UL — SIGNIFICANT CHANGE UP (ref 1–3.3)
LYMPHOCYTES # BLD AUTO: 28 % — SIGNIFICANT CHANGE UP (ref 13–44)
MCHC RBC-ENTMCNC: 28.2 PG — SIGNIFICANT CHANGE UP (ref 27–34)
MCHC RBC-ENTMCNC: 32 G/DL — SIGNIFICANT CHANGE UP (ref 32–36)
MCV RBC AUTO: 88.1 FL — SIGNIFICANT CHANGE UP (ref 80–100)
MONOCYTES # BLD AUTO: 0.7 K/UL — SIGNIFICANT CHANGE UP (ref 0–0.9)
MONOCYTES NFR BLD AUTO: 9.7 % — SIGNIFICANT CHANGE UP (ref 2–14)
NEUTROPHILS # BLD AUTO: 4.22 K/UL — SIGNIFICANT CHANGE UP (ref 1.8–7.4)
NEUTROPHILS NFR BLD AUTO: 58.7 % — SIGNIFICANT CHANGE UP (ref 43–77)
NRBC BLD AUTO-RTO: 0 /100 WBCS — SIGNIFICANT CHANGE UP (ref 0–0)
PLATELET # BLD AUTO: 120 K/UL — LOW (ref 150–400)
RBC # BLD: 5.29 M/UL — SIGNIFICANT CHANGE UP (ref 4.2–5.8)
RBC # FLD: 14.4 % — SIGNIFICANT CHANGE UP (ref 10.3–14.5)
WBC # BLD: 7.19 K/UL — SIGNIFICANT CHANGE UP (ref 3.8–10.5)
WBC # FLD AUTO: 7.19 K/UL — SIGNIFICANT CHANGE UP (ref 3.8–10.5)

## 2025-07-14 PROCEDURE — 99214 OFFICE O/P EST MOD 30 MIN: CPT

## 2025-07-14 PROCEDURE — G2211 COMPLEX E/M VISIT ADD ON: CPT

## 2025-07-15 LAB
AFP-TM SERPL-MCNC: 12.7 NG/ML
ALBUMIN SERPL ELPH-MCNC: 4.1 G/DL
ALP BLD-CCNC: 121 U/L
ALT SERPL-CCNC: 29 U/L
ANION GAP SERPL CALC-SCNC: 16 MMOL/L
AST SERPL-CCNC: 38 U/L
BILIRUB SERPL-MCNC: 0.6 MG/DL
BUN SERPL-MCNC: 20 MG/DL
CALCIUM SERPL-MCNC: 9.5 MG/DL
CHLORIDE SERPL-SCNC: 102 MMOL/L
CO2 SERPL-SCNC: 21 MMOL/L
CREAT SERPL-MCNC: 1.02 MG/DL
EGFRCR SERPLBLD CKD-EPI 2021: 77 ML/MIN/1.73M2
GLUCOSE SERPL-MCNC: 126 MG/DL
POTASSIUM SERPL-SCNC: 5.1 MMOL/L
PROT SERPL-MCNC: 7.6 G/DL
SODIUM SERPL-SCNC: 139 MMOL/L

## 2025-07-19 LAB
C DIFF TOXIN B QL PCR REFLEX: NORMAL
GDH ANTIGEN: NOT DETECTED
TOXIN A AND B: NOT DETECTED

## 2025-07-28 ENCOUNTER — APPOINTMENT (OUTPATIENT)
Dept: HEPATOLOGY | Facility: CLINIC | Age: 75
End: 2025-07-28
Payer: MEDICARE

## 2025-07-28 VITALS
DIASTOLIC BLOOD PRESSURE: 73 MMHG | SYSTOLIC BLOOD PRESSURE: 145 MMHG | HEIGHT: 69 IN | OXYGEN SATURATION: 95 % | BODY MASS INDEX: 27.7 KG/M2 | RESPIRATION RATE: 12 BRPM | WEIGHT: 187 LBS | HEART RATE: 49 BPM

## 2025-07-28 DIAGNOSIS — B18.1 CHRONIC VIRAL HEPATITIS B W/OUT DELTA-AGENT: ICD-10-CM

## 2025-07-28 PROCEDURE — 99215 OFFICE O/P EST HI 40 MIN: CPT

## 2025-07-31 ENCOUNTER — APPOINTMENT (OUTPATIENT)
Dept: HEMATOLOGY ONCOLOGY | Facility: CLINIC | Age: 75
End: 2025-07-31
Payer: MEDICARE

## 2025-07-31 ENCOUNTER — RESULT REVIEW (OUTPATIENT)
Age: 75
End: 2025-07-31

## 2025-07-31 VITALS
DIASTOLIC BLOOD PRESSURE: 66 MMHG | BODY MASS INDEX: 27.89 KG/M2 | RESPIRATION RATE: 18 BRPM | HEART RATE: 61 BPM | TEMPERATURE: 97.2 F | SYSTOLIC BLOOD PRESSURE: 114 MMHG | OXYGEN SATURATION: 96 % | WEIGHT: 188.27 LBS | HEIGHT: 69 IN

## 2025-07-31 DIAGNOSIS — I10 ESSENTIAL (PRIMARY) HYPERTENSION: ICD-10-CM

## 2025-07-31 LAB
AFP-TM SERPL-MCNC: 14.7 NG/ML
ALBUMIN SERPL ELPH-MCNC: 4 G/DL
ALP BLD-CCNC: 119 U/L
ALT SERPL-CCNC: 36 U/L
ANION GAP SERPL CALC-SCNC: 13 MMOL/L
AST SERPL-CCNC: 43 U/L
BASOPHILS # BLD AUTO: 0.03 K/UL — SIGNIFICANT CHANGE UP (ref 0–0.2)
BASOPHILS NFR BLD AUTO: 0.5 % — SIGNIFICANT CHANGE UP (ref 0–2)
BILIRUB SERPL-MCNC: 0.7 MG/DL
BUN SERPL-MCNC: 16 MG/DL
CALCIUM SERPL-MCNC: 10 MG/DL
CHLORIDE SERPL-SCNC: 102 MMOL/L
CO2 SERPL-SCNC: 24 MMOL/L
CREAT SERPL-MCNC: 0.91 MG/DL
EGFRCR SERPLBLD CKD-EPI 2021: 88 ML/MIN/1.73M2
EOSINOPHIL # BLD AUTO: 0.22 K/UL — SIGNIFICANT CHANGE UP (ref 0–0.5)
EOSINOPHIL NFR BLD AUTO: 3.3 % — SIGNIFICANT CHANGE UP (ref 0–6)
GLUCOSE SERPL-MCNC: 185 MG/DL
HCT VFR BLD CALC: 47.8 % — SIGNIFICANT CHANGE UP (ref 39–50)
HGB BLD-MCNC: 15.5 G/DL — SIGNIFICANT CHANGE UP (ref 13–17)
IMM GRANULOCYTES NFR BLD AUTO: 0.2 % — SIGNIFICANT CHANGE UP (ref 0–0.9)
LYMPHOCYTES # BLD AUTO: 1.79 K/UL — SIGNIFICANT CHANGE UP (ref 1–3.3)
LYMPHOCYTES # BLD AUTO: 27.2 % — SIGNIFICANT CHANGE UP (ref 13–44)
MCHC RBC-ENTMCNC: 27.8 PG — SIGNIFICANT CHANGE UP (ref 27–34)
MCHC RBC-ENTMCNC: 32.4 G/DL — SIGNIFICANT CHANGE UP (ref 32–36)
MCV RBC AUTO: 85.7 FL — SIGNIFICANT CHANGE UP (ref 80–100)
MONOCYTES # BLD AUTO: 0.54 K/UL — SIGNIFICANT CHANGE UP (ref 0–0.9)
MONOCYTES NFR BLD AUTO: 8.2 % — SIGNIFICANT CHANGE UP (ref 2–14)
NEUTROPHILS # BLD AUTO: 3.98 K/UL — SIGNIFICANT CHANGE UP (ref 1.8–7.4)
NEUTROPHILS NFR BLD AUTO: 60.6 % — SIGNIFICANT CHANGE UP (ref 43–77)
NRBC BLD AUTO-RTO: 0 /100 WBCS — SIGNIFICANT CHANGE UP (ref 0–0)
PLATELET # BLD AUTO: 127 K/UL — LOW (ref 150–400)
POTASSIUM SERPL-SCNC: 5 MMOL/L
PROT SERPL-MCNC: 7.4 G/DL
RBC # BLD: 5.58 M/UL — SIGNIFICANT CHANGE UP (ref 4.2–5.8)
RBC # FLD: 14.5 % — SIGNIFICANT CHANGE UP (ref 10.3–14.5)
SODIUM SERPL-SCNC: 138 MMOL/L
WBC # BLD: 6.57 K/UL — SIGNIFICANT CHANGE UP (ref 3.8–10.5)
WBC # FLD AUTO: 6.57 K/UL — SIGNIFICANT CHANGE UP (ref 3.8–10.5)

## 2025-07-31 PROCEDURE — 99213 OFFICE O/P EST LOW 20 MIN: CPT

## 2025-07-31 PROCEDURE — G2211 COMPLEX E/M VISIT ADD ON: CPT

## 2025-08-07 ENCOUNTER — RESULT REVIEW (OUTPATIENT)
Age: 75
End: 2025-08-07

## 2025-08-13 ENCOUNTER — APPOINTMENT (OUTPATIENT)
Dept: CT IMAGING | Facility: CLINIC | Age: 75
End: 2025-08-13
Payer: MEDICARE

## 2025-08-13 ENCOUNTER — APPOINTMENT (OUTPATIENT)
Dept: MRI IMAGING | Facility: CLINIC | Age: 75
End: 2025-08-13
Payer: MEDICARE

## 2025-08-13 PROCEDURE — A9585: CPT

## 2025-08-13 PROCEDURE — 72197 MRI PELVIS W/O & W/DYE: CPT

## 2025-08-13 PROCEDURE — 71250 CT THORAX DX C-: CPT

## 2025-08-13 PROCEDURE — 74183 MRI ABD W/O CNTR FLWD CNTR: CPT

## 2025-08-14 ENCOUNTER — APPOINTMENT (OUTPATIENT)
Dept: HEMATOLOGY ONCOLOGY | Facility: CLINIC | Age: 75
End: 2025-08-14

## 2025-08-14 VITALS
WEIGHT: 187.39 LBS | OXYGEN SATURATION: 96 % | BODY MASS INDEX: 27.67 KG/M2 | HEART RATE: 53 BPM | SYSTOLIC BLOOD PRESSURE: 152 MMHG | DIASTOLIC BLOOD PRESSURE: 71 MMHG | RESPIRATION RATE: 17 BRPM | TEMPERATURE: 97.2 F

## 2025-08-14 DIAGNOSIS — C22.0 LIVER CELL CARCINOMA: ICD-10-CM

## 2025-08-14 PROCEDURE — G2211 COMPLEX E/M VISIT ADD ON: CPT

## 2025-08-14 PROCEDURE — 99214 OFFICE O/P EST MOD 30 MIN: CPT

## 2025-09-05 ENCOUNTER — APPOINTMENT (OUTPATIENT)
Dept: INTERVENTIONAL RADIOLOGY/VASCULAR | Facility: CLINIC | Age: 75
End: 2025-09-05
Payer: MEDICARE

## 2025-09-05 PROCEDURE — 99214 OFFICE O/P EST MOD 30 MIN: CPT | Mod: 2W
